# Patient Record
Sex: FEMALE | Race: WHITE
[De-identification: names, ages, dates, MRNs, and addresses within clinical notes are randomized per-mention and may not be internally consistent; named-entity substitution may affect disease eponyms.]

---

## 2018-07-25 ENCOUNTER — HOSPITAL ENCOUNTER (OUTPATIENT)
Dept: HOSPITAL 9 - MADLAB | Age: 78
Discharge: HOME | End: 2018-07-25
Payer: MEDICARE

## 2018-07-25 DIAGNOSIS — M47.817: Primary | ICD-10-CM

## 2018-07-25 DIAGNOSIS — M46.1: ICD-10-CM

## 2018-07-25 LAB
BASOPHILS # BLD AUTO: 0.1 THOU/UL (ref 0–0.2)
BASOPHILS NFR BLD AUTO: 1 % (ref 0–1)
EOSINOPHIL # BLD AUTO: 0.2 THOU/UL (ref 0–0.7)
EOSINOPHIL NFR BLD AUTO: 2.2 % (ref 0–10)
HGB BLD-MCNC: 15 G/DL (ref 12–16)
LYMPHOCYTES # BLD AUTO: 2.5 THOU/UL (ref 1.2–3.4)
LYMPHOCYTES NFR BLD AUTO: 27.7 % (ref 21–51)
MCH RBC QN AUTO: 29.9 PG (ref 27–31)
MCV RBC AUTO: 92.9 FL (ref 78–98)
MONOCYTES # BLD AUTO: 0.6 THOU/UL (ref 0.11–0.59)
MONOCYTES NFR BLD AUTO: 6.6 % (ref 0–10)
NEUTROPHILS # BLD AUTO: 5.7 THOU/UL (ref 1.4–6.5)
NEUTROPHILS NFR BLD AUTO: 62.4 % (ref 42–75)
PLATELET # BLD AUTO: 216 THOU/UL (ref 130–400)
RBC # BLD AUTO: 5 MILL/UL (ref 4.2–5.4)
WBC # BLD AUTO: 9.1 THOU/UL (ref 4.8–10.8)

## 2018-07-25 PROCEDURE — 85025 COMPLETE CBC W/AUTO DIFF WBC: CPT

## 2018-07-25 PROCEDURE — 36415 COLL VENOUS BLD VENIPUNCTURE: CPT

## 2018-09-11 ENCOUNTER — HOSPITAL ENCOUNTER (OUTPATIENT)
Dept: HOSPITAL 9 - MADLABBHPM | Age: 78
Discharge: HOME | End: 2018-09-11
Attending: FAMILY MEDICINE
Payer: MEDICARE

## 2018-09-11 DIAGNOSIS — N30.00: Primary | ICD-10-CM

## 2018-09-11 PROCEDURE — 87077 CULTURE AEROBIC IDENTIFY: CPT

## 2018-09-11 PROCEDURE — 84443 ASSAY THYROID STIM HORMONE: CPT

## 2018-09-11 PROCEDURE — 87086 URINE CULTURE/COLONY COUNT: CPT

## 2018-09-11 PROCEDURE — 36415 COLL VENOUS BLD VENIPUNCTURE: CPT

## 2018-09-25 ENCOUNTER — HOSPITAL ENCOUNTER (OUTPATIENT)
Dept: HOSPITAL 9 - MADLABBHPM | Age: 78
Discharge: HOME | End: 2018-09-25
Attending: FAMILY MEDICINE
Payer: MEDICARE

## 2018-09-25 DIAGNOSIS — N30.00: ICD-10-CM

## 2018-09-25 DIAGNOSIS — Z01.818: Primary | ICD-10-CM

## 2018-09-25 LAB
ALBUMIN SERPL BCG-MCNC: 3.9 G/DL (ref 3.4–4.8)
ALP SERPL-CCNC: 95 U/L (ref 40–150)
ALT SERPL W P-5'-P-CCNC: 11 U/L (ref 8–55)
ANION GAP SERPL CALC-SCNC: 11 MMOL/L (ref 10–20)
AST SERPL-CCNC: 18 U/L (ref 5–34)
BACTERIA UR QL AUTO: (no result) HPF
BASOPHILS # BLD AUTO: 0.1 THOU/UL (ref 0–0.2)
BASOPHILS NFR BLD AUTO: 0.6 % (ref 0–1)
BILIRUB SERPL-MCNC: 0.7 MG/DL (ref 0.2–1.2)
BUN SERPL-MCNC: 10 MG/DL (ref 9.8–20.1)
CALCIUM SERPL-MCNC: 9.8 MG/DL (ref 7.8–10.44)
CHLORIDE SERPL-SCNC: 99 MMOL/L (ref 98–107)
CO2 SERPL-SCNC: 33 MMOL/L (ref 23–31)
CREAT CL PREDICTED SERPL C-G-VRATE: 0 ML/MIN (ref 70–130)
EOSINOPHIL # BLD AUTO: 0.2 THOU/UL (ref 0–0.7)
EOSINOPHIL NFR BLD AUTO: 1.6 % (ref 0–10)
GLOBULIN SER CALC-MCNC: 3.3 G/DL (ref 2.4–3.5)
GLUCOSE SERPL-MCNC: 112 MG/DL (ref 83–110)
HGB BLD-MCNC: 15.5 G/DL (ref 12–16)
LYMPHOCYTES # BLD AUTO: 2.4 THOU/UL (ref 1.2–3.4)
LYMPHOCYTES NFR BLD AUTO: 23.8 % (ref 21–51)
MCH RBC QN AUTO: 30.2 PG (ref 27–31)
MCV RBC AUTO: 93.3 FL (ref 78–98)
MONOCYTES # BLD AUTO: 0.6 THOU/UL (ref 0.11–0.59)
MONOCYTES NFR BLD AUTO: 6.2 % (ref 0–10)
NEUTROPHILS # BLD AUTO: 6.9 THOU/UL (ref 1.4–6.5)
NEUTROPHILS NFR BLD AUTO: 67.8 % (ref 42–75)
PLATELET # BLD AUTO: 280 THOU/UL (ref 130–400)
POTASSIUM SERPL-SCNC: 3.6 MMOL/L (ref 3.5–5.1)
RBC # BLD AUTO: 5.13 MILL/UL (ref 4.2–5.4)
RBC UR QL AUTO: (no result) HPF (ref 0–3)
SODIUM SERPL-SCNC: 139 MMOL/L (ref 136–145)
SP GR UR STRIP: 1.01 (ref 1–1.03)
WBC # BLD AUTO: 10.1 THOU/UL (ref 4.8–10.8)
WBC UR QL AUTO: (no result) HPF (ref 0–3)

## 2018-09-25 PROCEDURE — 81001 URINALYSIS AUTO W/SCOPE: CPT

## 2018-09-25 PROCEDURE — 80053 COMPREHEN METABOLIC PANEL: CPT

## 2018-09-25 PROCEDURE — 85025 COMPLETE CBC W/AUTO DIFF WBC: CPT

## 2018-09-25 PROCEDURE — 87086 URINE CULTURE/COLONY COUNT: CPT

## 2018-09-25 PROCEDURE — 36415 COLL VENOUS BLD VENIPUNCTURE: CPT

## 2018-10-01 ENCOUNTER — HOSPITAL ENCOUNTER (EMERGENCY)
Dept: HOSPITAL 9 - MADERS | Age: 78
Discharge: HOME | End: 2018-10-01
Payer: MEDICARE

## 2018-10-01 DIAGNOSIS — Z47.89: Primary | ICD-10-CM

## 2018-10-01 PROCEDURE — 99282 EMERGENCY DEPT VISIT SF MDM: CPT

## 2018-10-15 ENCOUNTER — HOSPITAL ENCOUNTER (EMERGENCY)
Dept: HOSPITAL 9 - MADERS | Age: 78
Discharge: HOME | End: 2018-10-15
Payer: MEDICARE

## 2018-10-15 DIAGNOSIS — Z79.899: ICD-10-CM

## 2018-10-15 DIAGNOSIS — R03.0: Primary | ICD-10-CM

## 2018-10-15 PROCEDURE — 99283 EMERGENCY DEPT VISIT LOW MDM: CPT

## 2018-10-28 ENCOUNTER — HOSPITAL ENCOUNTER (EMERGENCY)
Dept: HOSPITAL 9 - MADERS | Age: 78
Discharge: ANOTHER HOSPITAL | End: 2018-10-28
Payer: MEDICARE

## 2018-10-28 ENCOUNTER — HOSPITAL ENCOUNTER (INPATIENT)
Dept: HOSPITAL 92 - ERS | Age: 78
LOS: 9 days | Discharge: NURSING, REHAB OR LONG-TERM CARE | DRG: 189 | End: 2018-11-06
Attending: HOSPITALIST | Admitting: HOSPITALIST
Payer: MEDICARE

## 2018-10-28 VITALS — BODY MASS INDEX: 25.8 KG/M2

## 2018-10-28 DIAGNOSIS — G93.41: ICD-10-CM

## 2018-10-28 DIAGNOSIS — B96.20: ICD-10-CM

## 2018-10-28 DIAGNOSIS — Z85.118: ICD-10-CM

## 2018-10-28 DIAGNOSIS — J44.9: ICD-10-CM

## 2018-10-28 DIAGNOSIS — I48.0: ICD-10-CM

## 2018-10-28 DIAGNOSIS — E86.9: ICD-10-CM

## 2018-10-28 DIAGNOSIS — Z96.642: ICD-10-CM

## 2018-10-28 DIAGNOSIS — D64.9: ICD-10-CM

## 2018-10-28 DIAGNOSIS — I25.10: ICD-10-CM

## 2018-10-28 DIAGNOSIS — E87.2: ICD-10-CM

## 2018-10-28 DIAGNOSIS — Z79.899: ICD-10-CM

## 2018-10-28 DIAGNOSIS — F11.90: ICD-10-CM

## 2018-10-28 DIAGNOSIS — D72.829: Primary | ICD-10-CM

## 2018-10-28 DIAGNOSIS — E78.5: ICD-10-CM

## 2018-10-28 DIAGNOSIS — R79.89: ICD-10-CM

## 2018-10-28 DIAGNOSIS — E03.9: ICD-10-CM

## 2018-10-28 DIAGNOSIS — F17.210: ICD-10-CM

## 2018-10-28 DIAGNOSIS — I48.92: ICD-10-CM

## 2018-10-28 DIAGNOSIS — N17.9: ICD-10-CM

## 2018-10-28 DIAGNOSIS — I10: ICD-10-CM

## 2018-10-28 DIAGNOSIS — N30.90: ICD-10-CM

## 2018-10-28 DIAGNOSIS — Z90.49: ICD-10-CM

## 2018-10-28 DIAGNOSIS — I24.8: ICD-10-CM

## 2018-10-28 DIAGNOSIS — M81.0: ICD-10-CM

## 2018-10-28 DIAGNOSIS — I08.1: ICD-10-CM

## 2018-10-28 DIAGNOSIS — G89.29: ICD-10-CM

## 2018-10-28 DIAGNOSIS — J96.01: Primary | ICD-10-CM

## 2018-10-28 DIAGNOSIS — N39.0: ICD-10-CM

## 2018-10-28 LAB
ALBUMIN SERPL BCG-MCNC: 3.7 G/DL (ref 3.4–4.8)
ALP SERPL-CCNC: 95 U/L (ref 40–150)
ALT SERPL W P-5'-P-CCNC: 34 U/L (ref 8–55)
ANALYZER IN CARDIO: (no result)
ANION GAP SERPL CALC-SCNC: 19 MMOL/L (ref 10–20)
APTT PPP: 138.2 SEC (ref 22.9–36.1)
APTT PPP: 28.2 SEC (ref 22.9–36.1)
APTT PPP: 31.7 SEC (ref 22.9–36.1)
AST SERPL-CCNC: 59 U/L (ref 5–34)
BACTERIA UR QL AUTO: (no result) HPF
BASE EXCESS STD BLDA CALC-SCNC: -9.4 MEQ/L
BASOPHILS # BLD AUTO: 0.1 THOU/UL (ref 0–0.2)
BASOPHILS NFR BLD AUTO: 0.4 % (ref 0–1)
BILIRUB SERPL-MCNC: 0.4 MG/DL (ref 0.2–1.2)
BUN SERPL-MCNC: 29 MG/DL (ref 9.8–20.1)
CA-I BLDA-SCNC: 0.86 MMOL/L (ref 1.12–1.3)
CALCIUM SERPL-MCNC: 9.2 MG/DL (ref 7.8–10.44)
CHD RISK SERPL-RTO: 2 (ref ?–4.5)
CHLORIDE SERPL-SCNC: 92 MMOL/L (ref 98–107)
CHOLEST SERPL-MCNC: 72 MG/DL
CK MB SERPL-MCNC: 13.8 NG/ML (ref 0–6.6)
CO2 SERPL-SCNC: 28 MMOL/L (ref 23–31)
COMM CRITICAL RESULTS DOC: (no result)
CREAT CL PREDICTED SERPL C-G-VRATE: 0 ML/MIN (ref 70–130)
EOSINOPHIL # BLD AUTO: 0 THOU/UL (ref 0–0.7)
EOSINOPHIL NFR BLD AUTO: 0.1 % (ref 0–10)
GLOBULIN SER CALC-MCNC: 2.8 G/DL (ref 2.4–3.5)
GLUCOSE SERPL-MCNC: 184 MG/DL (ref 83–110)
HCO3 BLDA-SCNC: 17.7 MEQ/L (ref 22–28)
HCT VFR BLDA CALC: 27 % (ref 36–47)
HDLC SERPL-MCNC: 36 MG/DL
HGB BLD-MCNC: 13.2 G/DL (ref 12–16)
HGB BLD-MCNC: 13.3 G/DL (ref 12–16)
HGB BLD-MCNC: 14.7 G/DL (ref 12–16)
HGB BLDA-MCNC: 9.3 G/DL (ref 12–16)
INR PPP: 1
INR PPP: 1.1
LDLC SERPL CALC-MCNC: 28 MG/DL
LYMPHOCYTES # BLD: 1.7 THOU/UL (ref 1.2–3.4)
LYMPHOCYTES NFR BLD AUTO: 9.9 % (ref 21–51)
MCH RBC QN AUTO: 30.6 PG (ref 27–31)
MCH RBC QN AUTO: 30.9 PG (ref 27–31)
MCV RBC AUTO: 94.6 FL (ref 78–98)
MCV RBC AUTO: 95.9 FL (ref 78–98)
MDIFF COMPLETE?: YES
MONOCYTES # BLD AUTO: 1.5 THOU/UL (ref 0.11–0.59)
MONOCYTES NFR BLD AUTO: 8.9 % (ref 0–10)
NEUTROPHILS # BLD AUTO: 13.6 THOU/UL (ref 1.4–6.5)
NEUTROPHILS NFR BLD AUTO: 80.7 % (ref 42–75)
PCO2 BLDA: 43.6 MMHG (ref 35–45)
PH BLDA: 7.23 [PH] (ref 7.35–7.45)
PLATELET # BLD AUTO: 165 THOU/UL (ref 130–400)
PLATELET # BLD AUTO: 171 THOU/UL (ref 130–400)
PLATELET # BLD AUTO: 197 THOU/UL (ref 130–400)
PLATELET BLD QL SMEAR: (no result)
PO2 BLDA: 40 MMHG (ref 70–?)
POTASSIUM BLD-SCNC: 2.16 MMOL/L (ref 3.7–5.3)
POTASSIUM SERPL-SCNC: 4.2 MMOL/L (ref 3.5–5.1)
PROT UR STRIP.AUTO-MCNC: 30 MG/DL
PROTHROMBIN TIME: 13.1 SEC (ref 12–14.7)
PROTHROMBIN TIME: 13.9 SEC (ref 12–14.7)
RBC # BLD AUTO: 4.31 MILL/UL (ref 4.2–5.4)
RBC # BLD AUTO: 4.79 MILL/UL (ref 4.2–5.4)
RBC UR QL AUTO: (no result) HPF (ref 0–3)
SODIUM SERPL-SCNC: 135 MMOL/L (ref 136–145)
SP GR UR STRIP: 1.02 (ref 1–1.03)
SPECIMEN DRAWN FROM PATIENT: (no result)
TRIGL SERPL-MCNC: 39 MG/DL (ref ?–150)
TROPONIN I SERPL DL<=0.01 NG/ML-MCNC: 1.76 NG/ML (ref ?–0.03)
TROPONIN I SERPL DL<=0.01 NG/ML-MCNC: 2.06 NG/ML (ref ?–0.03)
TROPONIN I SERPL DL<=0.01 NG/ML-MCNC: 2.08 NG/ML (ref ?–0.03)
UNIDENT CRYS #/AREA URNS HPF: (no result) HPF
WBC # BLD AUTO: 16.8 THOU/UL (ref 4.8–10.8)
WBC # BLD AUTO: 22.7 THOU/UL (ref 4.8–10.8)
WBC UR QL AUTO: (no result) HPF (ref 0–3)

## 2018-10-28 PROCEDURE — 71045 X-RAY EXAM CHEST 1 VIEW: CPT

## 2018-10-28 PROCEDURE — 93005 ELECTROCARDIOGRAM TRACING: CPT

## 2018-10-28 PROCEDURE — 93798 PHYS/QHP OP CAR RHAB W/ECG: CPT

## 2018-10-28 PROCEDURE — 85027 COMPLETE CBC AUTOMATED: CPT

## 2018-10-28 PROCEDURE — 80048 BASIC METABOLIC PNL TOTAL CA: CPT

## 2018-10-28 PROCEDURE — 36415 COLL VENOUS BLD VENIPUNCTURE: CPT

## 2018-10-28 PROCEDURE — 93306 TTE W/DOPPLER COMPLETE: CPT

## 2018-10-28 PROCEDURE — 93010 ELECTROCARDIOGRAM REPORT: CPT

## 2018-10-28 PROCEDURE — 99152 MOD SED SAME PHYS/QHP 5/>YRS: CPT

## 2018-10-28 PROCEDURE — 96376 TX/PRO/DX INJ SAME DRUG ADON: CPT

## 2018-10-28 PROCEDURE — 81003 URINALYSIS AUTO W/O SCOPE: CPT

## 2018-10-28 PROCEDURE — 85049 AUTOMATED PLATELET COUNT: CPT

## 2018-10-28 PROCEDURE — 84443 ASSAY THYROID STIM HORMONE: CPT

## 2018-10-28 PROCEDURE — 83605 ASSAY OF LACTIC ACID: CPT

## 2018-10-28 PROCEDURE — 83880 ASSAY OF NATRIURETIC PEPTIDE: CPT

## 2018-10-28 PROCEDURE — 82805 BLOOD GASES W/O2 SATURATION: CPT

## 2018-10-28 PROCEDURE — C1769 GUIDE WIRE: HCPCS

## 2018-10-28 PROCEDURE — 94760 N-INVAS EAR/PLS OXIMETRY 1: CPT

## 2018-10-28 PROCEDURE — 96360 HYDRATION IV INFUSION INIT: CPT

## 2018-10-28 PROCEDURE — 80061 LIPID PANEL: CPT

## 2018-10-28 PROCEDURE — 87086 URINE CULTURE/COLONY COUNT: CPT

## 2018-10-28 PROCEDURE — B2111ZZ FLUOROSCOPY OF MULTIPLE CORONARY ARTERIES USING LOW OSMOLAR CONTRAST: ICD-10-PCS | Performed by: INTERNAL MEDICINE

## 2018-10-28 PROCEDURE — B2151ZZ FLUOROSCOPY OF LEFT HEART USING LOW OSMOLAR CONTRAST: ICD-10-PCS | Performed by: INTERNAL MEDICINE

## 2018-10-28 PROCEDURE — 82553 CREATINE MB FRACTION: CPT

## 2018-10-28 PROCEDURE — 96365 THER/PROPH/DIAG IV INF INIT: CPT

## 2018-10-28 PROCEDURE — 36680 INSERT NEEDLE BONE CAVITY: CPT

## 2018-10-28 PROCEDURE — 96368 THER/DIAG CONCURRENT INF: CPT

## 2018-10-28 PROCEDURE — A4353 INTERMITTENT URINARY CATH: HCPCS

## 2018-10-28 PROCEDURE — 93458 L HRT ARTERY/VENTRICLE ANGIO: CPT

## 2018-10-28 PROCEDURE — 80202 ASSAY OF VANCOMYCIN: CPT

## 2018-10-28 PROCEDURE — 80053 COMPREHEN METABOLIC PANEL: CPT

## 2018-10-28 PROCEDURE — 85730 THROMBOPLASTIN TIME PARTIAL: CPT

## 2018-10-28 PROCEDURE — 96361 HYDRATE IV INFUSION ADD-ON: CPT

## 2018-10-28 PROCEDURE — 87186 SC STD MICRODIL/AGAR DIL: CPT

## 2018-10-28 PROCEDURE — 85610 PROTHROMBIN TIME: CPT

## 2018-10-28 PROCEDURE — 81015 MICROSCOPIC EXAM OF URINE: CPT

## 2018-10-28 PROCEDURE — 85025 COMPLETE CBC W/AUTO DIFF WBC: CPT

## 2018-10-28 PROCEDURE — 87804 INFLUENZA ASSAY W/OPTIC: CPT

## 2018-10-28 PROCEDURE — 94660 CPAP INITIATION&MGMT: CPT

## 2018-10-28 PROCEDURE — 76882 US LMTD JT/FCL EVL NVASC XTR: CPT

## 2018-10-28 PROCEDURE — 85014 HEMATOCRIT: CPT

## 2018-10-28 PROCEDURE — 84484 ASSAY OF TROPONIN QUANT: CPT

## 2018-10-28 PROCEDURE — 94640 AIRWAY INHALATION TREATMENT: CPT

## 2018-10-28 PROCEDURE — 87077 CULTURE AEROBIC IDENTIFY: CPT

## 2018-10-28 PROCEDURE — 85007 BL SMEAR W/DIFF WBC COUNT: CPT

## 2018-10-28 PROCEDURE — 85018 HEMOGLOBIN: CPT

## 2018-10-28 PROCEDURE — 4A023N7 MEASUREMENT OF CARDIAC SAMPLING AND PRESSURE, LEFT HEART, PERCUTANEOUS APPROACH: ICD-10-PCS | Performed by: INTERNAL MEDICINE

## 2018-10-28 PROCEDURE — S0028 INJECTION, FAMOTIDINE, 20 MG: HCPCS

## 2018-10-28 PROCEDURE — 87040 BLOOD CULTURE FOR BACTERIA: CPT

## 2018-10-28 RX ADMIN — FAMOTIDINE SCH MG: 10 INJECTION, SOLUTION INTRAVENOUS at 21:53

## 2018-10-28 NOTE — RAD
PORTABLE CHEST:

 

Date:  10/28/18 

 

HISTORY:  

Chest pain. 

 

COMPARISON:  

2007 film. 

 

FINDINGS:

Mild cardiomegaly. There is evidence of mild vascular congestion. Interstitial prominence suggests mi
ld interstitial congestion and edema. No confluent alveolar process. No significant effusion. 

 

IMPRESSION: 

Mild congestive change. 

 

 

POS: [facility]

## 2018-10-28 NOTE — RAD
CHEST ONE VIEW:

 

History: Chest pain. 

 

Comparison: Earlier exam on same date. 

 

FINDINGS: 

Cardiac silhouette is magnified by projection. Pulmonary vasculature unremarkable. Patient is rotated
 rightward. Calcification in the arterial structures. No lobar consolidation or evidence of pneumotho
rax. 

 

IMPRESSION: 

1. Atherosclerosis. 

2. No active cardiopulmonary abnormalities are demonstrated.

 

POS: [facility]

## 2018-10-29 LAB
ANION GAP SERPL CALC-SCNC: 13 MMOL/L (ref 10–20)
BASOPHILS # BLD AUTO: 0 THOU/UL (ref 0–0.2)
BASOPHILS NFR BLD AUTO: 0.2 % (ref 0–1)
BUN SERPL-MCNC: 30 MG/DL (ref 9.8–20.1)
CALCIUM SERPL-MCNC: 8.1 MG/DL (ref 7.8–10.44)
CHLORIDE SERPL-SCNC: 101 MMOL/L (ref 98–107)
CO2 SERPL-SCNC: 23 MMOL/L (ref 23–31)
CREAT CL PREDICTED SERPL C-G-VRATE: 39 ML/MIN (ref 70–130)
EOSINOPHIL # BLD AUTO: 0 THOU/UL (ref 0–0.7)
EOSINOPHIL NFR BLD AUTO: 0.1 % (ref 0–10)
GLUCOSE SERPL-MCNC: 106 MG/DL (ref 83–110)
HGB BLD-MCNC: 12.7 G/DL (ref 12–16)
LYMPHOCYTES # BLD: 2.1 THOU/UL (ref 1.2–3.4)
LYMPHOCYTES NFR BLD AUTO: 15.4 % (ref 21–51)
MCH RBC QN AUTO: 31.3 PG (ref 27–31)
MCV RBC AUTO: 95.2 FL (ref 78–98)
MONOCYTES # BLD AUTO: 1.1 THOU/UL (ref 0.11–0.59)
MONOCYTES NFR BLD AUTO: 8 % (ref 0–10)
NEUTROPHILS # BLD AUTO: 10.6 THOU/UL (ref 1.4–6.5)
NEUTROPHILS NFR BLD AUTO: 76.4 % (ref 42–75)
PLATELET # BLD AUTO: 149 THOU/UL (ref 130–400)
POTASSIUM SERPL-SCNC: 3.6 MMOL/L (ref 3.5–5.1)
RBC # BLD AUTO: 4.05 MILL/UL (ref 4.2–5.4)
SODIUM SERPL-SCNC: 133 MMOL/L (ref 136–145)
TROPONIN I SERPL DL<=0.01 NG/ML-MCNC: 1.57 NG/ML (ref ?–0.03)
WBC # BLD AUTO: 13.9 THOU/UL (ref 4.8–10.8)

## 2018-10-29 RX ADMIN — FAMOTIDINE SCH MG: 10 INJECTION, SOLUTION INTRAVENOUS at 08:55

## 2018-10-29 RX ADMIN — HYDROCODONE BITARTRATE AND ACETAMINOPHEN PRN TAB: 10; 325 TABLET ORAL at 15:00

## 2018-10-29 RX ADMIN — FAMOTIDINE SCH MG: 10 INJECTION, SOLUTION INTRAVENOUS at 20:24

## 2018-10-29 RX ADMIN — MOMETASONE FUROATE AND FORMOTEROL FUMARATE DIHYDRATE SCH PUFF: 200; 5 AEROSOL RESPIRATORY (INHALATION) at 18:38

## 2018-10-29 RX ADMIN — Medication SCH ML: at 20:24

## 2018-10-29 RX ADMIN — VANCOMYCIN HYDROCHLORIDE SCH MLS: 1 INJECTION, SOLUTION INTRAVENOUS at 20:23

## 2018-10-29 RX ADMIN — ASPIRIN SCH MG: 81 TABLET ORAL at 11:06

## 2018-10-29 NOTE — EKG
Test Reason : A-FIB

Blood Pressure : ***/*** mmHG

Vent. Rate : 118 BPM     Atrial Rate : 078 BPM

   P-R Int : 000 ms          QRS Dur : 096 ms

    QT Int : 332 ms       P-R-T Axes : 000 027 022 degrees

   QTc Int : 465 ms

 

Atrial fibrillation with rapid ventricular response

ST elevation, consider early repolarization, pericarditis, or injury

Abnormal ECG

Confirmed by NEYMAR ARMENTA ([id number]) on 10/29/2018 2:52:24 PM

 

Referred By:  AUGUSTO           Confirmed By:NEYMAR ARMENTA

## 2018-10-29 NOTE — CON
DATE OF CONSULTATION:  10/29/2018

 

PAST HISTORY:  Ms. Eid is a 78-year-old female who has issues with 
chronic back pain.  She presented with hypotension and metabolic acidosis as 
well as tachypnea.

 

She was placed on BiPAP.

 

She has had severe diarrhea for 2 days leading up to this, now is complaining 
of abdominal cramping, but has not had morphine since admission.

 

She says her diarrhea has resolved.

 

PAST MEDICAL HISTORY:  Remarkable for hypertension, hypothyroidism, osteoporosis
, history of lung cancer, history of lumbar spine surgery.

 

PAST SURGICAL HISTORY:

1.  History of lung cancer resected.

2.  Status post cholecystectomy.

3.  History of herniorrhaphy.

4.  History of hip replacement.

 

FAMILY HISTORY:  Negative for lung disease in early age.



SOCIAL HISTORY: Not obtained.

 

ALLERGIES:  She reports an allergy to HYDRALAZINE.  She does not use drugs.

 

MEDICATIONS:  Medications have been reviewed.

 

REVIEW OF SYSTEMS:  10 point review of systems completed, otherwise remarkable 
only for pain all over.

 

PHYSICAL EXAM



GENERAL: Ms. Eid is a 78-year-old female who has issues with chronic back 
pain.

HEENT: pupils react

EYES:Sclera anicteric 

NECK:no nodes

VITALS: She has had atrial fibrillation since she has been admitted.

LUNGS:faint wheezes

HEART:RRR NO GALLOP.  2/6 SYSTOLIC MURMUR 

ABDOMEN: soft   

EXTREMITIES: without clubbing.



LABORATORY DATA:  Urinalysis was unremarkable.  When she was admitted, her 
blood gas was done showed a pH 7.23, CO2 is 43, pO2 of 40 on BiPAP.  White 
count was 16.8, hemoglobin 13.3, platelets 171.  Today, sodium 135, potassium 
4.2, chloride 92, BUN 29, creatinine 1.87.  Followup Chem-7 shows creatinine 
down to 1.12.

 

IMPRESSION AND PLAN:  Metabolic acidosis secondary to severe diarrhea with 
secondary bicarbonate losses, this is improved.

 

There is no output recorded for 24 hours, which would be helpful, so I do not 
know where she is from a fluid standpoint, but it sounds like she was dry.

 

Her current complaints are more likely related to the beginning of acute 
morphine withdrawal, so she will need to be put back on her slow-release 
morphine.  She was given one dose of IV morphine this morning.

 

She will stay in the Intermediate Care Unit for now.

 

This is a 50-minute consult, with greater than 50% of the time spent in the 
unit coordinating care.

 

MAKAYLA

## 2018-10-29 NOTE — PDOC.PN
- Subjective


Encounter Start Date: 10/29/18


Encounter Start Time: 13:50


Subjective: f/u for AMS, NSTEMI, A-fib RVR. Nsg reports pt c/o of increasing 

back pain


-: noted on chronic narcotics at home. 





- Objective


Resuscitation Status: 


 











Resuscitation Status           FULL:Full Resuscitation














MAR Reviewed: Yes


Vital Signs & Weight: 


 Vital Signs (12 hours)











  Temp Pulse Resp BP Pulse Ox


 


 10/29/18 10:55   90  20  


 


 10/29/18 08:00      95


 


 10/29/18 07:32   93  18   92 L


 


 10/29/18 04:00  98.4 F  91  17  126/73  96


 


 10/29/18 02:30    13  


 


 10/29/18 02:29   96  13   96








 Weight











Weight                         127 lb 3.2 oz














I&O: 


 











 10/28/18 10/29/18 10/30/18





 06:59 06:59 06:59


 


Intake Total  800 


 


Balance  800 











Result Diagrams: 


 10/29/18 01:59





 10/29/18 01:59


Additional Labs: 





Microbiology





10/28/18 19:50   Nasal swab   Influenza Types A,B Direct EIA - Final





 Laboratory Tests











  10/28/18 10/28/18 10/28/18





  13:18 13:18 17:56


 


WBC   


 


Neutrophils %   


 


Sodium   135 L 


 


Creatinine   1.87 H 


 


Lactic Acid   


 


Troponin I  2.082 H*   2.059 H*


 


TSH 3rd Generation   














  10/28/18 10/28/18 10/28/18





  17:56 17:57 20:20


 


WBC   16.8 H 


 


Neutrophils %   80.7 H 


 


Sodium   


 


Creatinine   


 


Lactic Acid  1.4  


 


Troponin I   


 


TSH 3rd Generation    0.4216














  10/28/18 10/29/18 10/29/18





  23:00 01:59 01:59


 


WBC   


 


Neutrophils %   76.4 H 


 


Sodium   


 


Creatinine   


 


Lactic Acid   


 


Troponin I  1.760 H*   1.571 H*


 


TSH 3rd Generation   











EKG Reviewed by me: Yes (Tele - A-fib in 80's)





Phys Exam





- Physical Examination


Constitutional: NAD


alert, responsive


HEENT: PERRLA, sclera anicteric, oral pharynx no lesions


Neck: no nodes, no JVD, supple, full ROM


Respiratory: no wheezing, no rales, no rhonchi, clear to auscultation bilateral


S1, S2


Cardiovascular: no significant murmur, no rub, gallop, irregular


Gastrointestinal: soft, non-tender, no distention, positive bowel sounds


Musculoskeletal: no edema, pulses present


Neurological: normal sensation, moves all 4 limbs


Psychiatric: A&O x 3


Skin: no rash, normal turgor, cap refill <2 seconds





Dx/Plan


(1) NSTEMI (non-ST elevated myocardial infarction)


Code(s): I21.4 - NON-ST ELEVATION (NSTEMI) MYOCARDIAL INFARCTION   Status: 

Acute   Comment: Continue Lovenox, ASA, Lipitor, Cardiology consulted, Echo 

pending   





(2) Acute respiratory failure with hypoxia


Code(s): J96.01 - ACUTE RESPIRATORY FAILURE WITH HYPOXIA   Status: Acute   

Comment: Improved, off BiPAP NIMV, continue O2 via NC   





(3) Acute metabolic encephalopathy


Code(s): G93.41 - METABOLIC ENCEPHALOPATHY   Status: Acute   Comment: Likely 

multifactorial given chronic narcotic use, hypoxia and NSTEMI   





(4) ДМИТРИЙ (acute kidney injury)


Code(s): N17.9 - ACUTE KIDNEY FAILURE, UNSPECIFIED   Status: Acute   Comment: 

Improved, likely due to volume depletion, avoid nephrotoxic meds and limit 

contrast exposure   





(5) Atrial fibrillation with RVR


Code(s): I48.91 - UNSPECIFIED ATRIAL FIBRILLATION   Status: Acute   Comment: 

Continue Cardizem gtt, rate improved, 2D echo pending, anticoagulation with 

Lovenox and consider OAC options for d/c   





(6) Chronic pain syndrome


Code(s): G89.4 - CHRONIC PAIN SYNDROME   Status: Chronic   Comment: Resume home 

MS Contin, Gabapentin, Norco   





- Plan


plan discussed w/ family, PT/OT, , DVT proph w/SCDs


Stable currently


-: Continue Lovenox


-: Continue Cardizem gtt


-: 2D echo pending


-: AM lab: BMP, CBC





* .

## 2018-10-29 NOTE — CON
DATE OF CONSULTATION:  10/29/2018

 

HISTORY OF PRESENT:  This patient is a 78-year-old woman with no previous 
cardiac history who presented with fevers, chills, diarrhea, and was noted to 
have an elevated troponin level.  The patient was in her usual state of health 
until recently when she developed acute onset of diarrhea.  She noted to be 
having fevers and chills.  The patient denied having any chest discomfort.  The 
patient presented to the local Emergency Room, She was noted to have an 
elevated troponin level.

 

PAST MEDICAL HISTORY:  

1.  History of carcinoid tumor.

2.  Chronic back pain.

3.  Hypertension.

4.  Osteoporosis.

5.  Dyslipidemia.

 

PAST SURGICAL HISTORY:  She has had removal of the carcinoid from her right 
medial lung resection,  cholecystectomy, hernia repair,and  hip replacement 
surgery.

 

MEDICATIONS ON ADMISSION:  See nursing list.

 

FAMILY HISTORY:  There is no strong family history of coronary artery disease.

 

SOCIAL HISTORY:  The patient has a long history of tobacco abuse and smokes 1 
pack per day.

 

FAMILY HISTORY:  No strong family history of heart disease.

 

ALLERGIES:  No known drug allergies.

 

REVIEW OF SYSTEMS:  The patient reports having severe back discomfort.

 

PHYSICAL EXAMINATION:

GENERAL:  This is a thin woman in marked distress.

VITAL SIGNS:  Blood pressure of 126/73.

NECK:  Showed no jugular vein distention.

LUNGS:  Lungs have diminished breath sounds bilateral.

HEART:  Irregular rate and rhythm, normal S1, S2, 1/6 systolic murmur.

ABDOMEN:  Nondistended.

EXTREMITIES:  Showed trace edema.

SKIN:  Warm and dry. 

VASCULAR:  Radial pulses 2+.

 

LABORATORIES:  White blood cell count was 13.9, hemoglobin 12.7, hematocrit 
38.5 and her platelets 149.  Her sodium was 133, potassium 3.6, chloride 101, 
bicarbonate 23, BUN 30, creatinine is 1.12.  Troponin was 1.7.  

 

Her EKG revealed her to have atrial fibrillation with nonspecific ST 
abnormality.

 

IMPRESSION:

1.  Sepsis.

2.  Non-Q-wave myocardial infarction.

3.  Atrial fibrillation with a rapid ventricular response.

4.  Chronic back pain.

5.  History of carcinoid of a tumor.

6.  Tobacco abuse.

 

This patient presented with diarrhea, fever, and chills and a markedly elevated 
white blood cell count.  She has been treated with IV antibiotics.  The patient'
s troponin level is elevated. This may be secondary to demand ischemia.  The 
patient has multiple risk factors.  Would continue to treat the patient for 
sepsis.  We will check the patient's echocardiogram and follow this patient 
with you through her hospitalization.

 

MAKAYLA

## 2018-10-30 LAB
ANION GAP SERPL CALC-SCNC: 11 MMOL/L (ref 10–20)
BUN SERPL-MCNC: 21 MG/DL (ref 9.8–20.1)
CALCIUM SERPL-MCNC: 8.4 MG/DL (ref 7.8–10.44)
CHLORIDE SERPL-SCNC: 101 MMOL/L (ref 98–107)
CO2 SERPL-SCNC: 24 MMOL/L (ref 23–31)
CREAT CL PREDICTED SERPL C-G-VRATE: 55 ML/MIN (ref 70–130)
GLUCOSE SERPL-MCNC: 144 MG/DL (ref 83–110)
HGB BLD-MCNC: 12.6 G/DL (ref 12–16)
HGB BLD-MCNC: 12.8 G/DL (ref 12–16)
MCH RBC QN AUTO: 30.6 PG (ref 27–31)
MCV RBC AUTO: 94 FL (ref 78–98)
MDIFF COMPLETE?: YES
PLATELET # BLD AUTO: 169 THOU/UL (ref 130–400)
PLATELET # BLD AUTO: 188 THOU/UL (ref 130–400)
POTASSIUM SERPL-SCNC: 3.2 MMOL/L (ref 3.5–5.1)
RBC # BLD AUTO: 4.1 MILL/UL (ref 4.2–5.4)
SODIUM SERPL-SCNC: 133 MMOL/L (ref 136–145)
VANCOMYCIN TROUGH SERPL-MCNC: 8.6 UG/ML
WBC # BLD AUTO: 12.2 THOU/UL (ref 4.8–10.8)

## 2018-10-30 RX ADMIN — ASPIRIN SCH MG: 81 TABLET ORAL at 09:18

## 2018-10-30 RX ADMIN — MOMETASONE FUROATE AND FORMOTEROL FUMARATE DIHYDRATE SCH PUFF: 200; 5 AEROSOL RESPIRATORY (INHALATION) at 08:14

## 2018-10-30 RX ADMIN — FAMOTIDINE SCH MG: 10 INJECTION, SOLUTION INTRAVENOUS at 09:19

## 2018-10-30 RX ADMIN — Medication SCH ML: at 20:41

## 2018-10-30 RX ADMIN — VANCOMYCIN HYDROCHLORIDE SCH MLS: 1 INJECTION, SOLUTION INTRAVENOUS at 20:42

## 2018-10-30 RX ADMIN — FAMOTIDINE SCH MG: 10 INJECTION, SOLUTION INTRAVENOUS at 20:40

## 2018-10-30 RX ADMIN — Medication SCH: at 09:19

## 2018-10-30 RX ADMIN — MOMETASONE FUROATE AND FORMOTEROL FUMARATE DIHYDRATE SCH PUFF: 200; 5 AEROSOL RESPIRATORY (INHALATION) at 18:42

## 2018-10-30 NOTE — PRG
DATE OF SERVICE:  10/30/2018

 

SUBJECTIVE:  She says she feels much better today.  She denies shortness of breath.  She has had diar
danielle this morning.  She tells me she had C. difficile last year.

 

OBJECTIVE:

VITAL SIGNS:  She is afebrile, heart rate is 79, respiratory rate is 12, oximetry is 85-92 on 3-4 lit
ers nasal cannula, blood pressure 119/59.

LUNGS:  Clear.

HEART:  Regular rhythm.

ABDOMEN:  Soft.

 

LABORATORY DATA:  White count 12.2, hemoglobin 12.6, platelets 169.  Sodium 133, potassium 3.2, chlor
fabi 101, bicarbonate 24, BUN 21, creatinine 0.78.

 

IMPRESSION:

1.  Diarrhea.

2.  Metabolic acidosis secondary to prolonged diarrhea with intravascular volume contraction.

3.  Early opiate withdrawal yesterday withholding after her morphine was held.  That is dramatically 
improved.

 

PLAN:  Send stool for C. difficile.  Continue with oxygen therapy.  I do not see blood cultures that 
are pending.

 

Her antibiotics probably should be simplified with her history of C. difficile-induced diarrhea and n
o clear source of infection other than her gastrointestinal tract.

## 2018-10-30 NOTE — PQF
DATE:      10-30-18                                                       ATTN:
  DR. KATIE KIM



Please exercise your independent, professional judgment in responding to the 
clarification form. 

Clinical indicators are provided on the bottom of this form for your review



Please check appropriate box(s) to clarify if the following diagnosis has been 
ruled in or  ruled out:

                                     SEPSIS



[  ] Ruled in diagnosis

     [  ] Continue to treat        [  ] Resolved

[ x ] Ruled out diagnosis

[  ] Other diagnosis ___________

[  ] Unable to determine



In addition, please specify:

Present on Admission (POA):  [  ] Yes             [ x ] No             [  ] 
Unable to determine



For continuity of documentation, please document condition throughout progress 
notes and discharge summary.  Thank You.



CLINICAL INDICATORS - SIGNS / SYMPTOMS / LABS



ER DX:   SEPSIS,  HYPOXIC RESPIRATORY FAILURE,  LACTIC ACIDOSIS, NSTEMI



H&P:  

   ACUTE HYPOXIC RESP FAILURE, SEPSIS,  NON ST ELEVATION MI,  ELEVATED LACTIC 
ACIDOSIS IS MOST

   LIKELY 2/2 SEPSIS, WHICH IS IMPROVING, ACUTE METABOLIC ENCEPHALOPATHY



WBC: 

      10-28-18:   16.8

      10-29-18:   13.9

      10-30-18:   12.2



BP:  ER:   96/43,  88/60



PULSE:   ER:  103

              10-29-18:   127



RR:  ER:  34,  30, 24

        10-29-18:   22



RISK FACTORS: 

     H&P:   HX HTN, HYPOTHYROIDISM,  SMOKER,  HX LUNG CA, CAME IN WITH SOB, CP, 
AND GENERALIZED

     WEAKNESS,  DIARRHEA, HYPOTENSIVE



TREATMENTS:  

       ER:  VANCOMYCIN IV

       MAR:   MAXIPIME IV,   NS IVF



(This form is maintained as a part of the permanent medical record)

 2015 [employer].  All Rights Reserved

DANIELA [email or web address]    Office:  [phone]

                                                              

 

[facility]AY[facility]

## 2018-10-30 NOTE — PDOC.PN
- Subjective


Encounter Start Date: 10/30/18


Encounter Start Time: 14:40


Subjective: f/u for ? sepsis, NSTEMI, A-fib rvr converting to SR. Feels better 

overall.


-: Not sleeping very well. Appetite ok. Ucx showing GNR.





- Objective


Resuscitation Status: 


 











Resuscitation Status           FULL:Full Resuscitation














MAR Reviewed: Yes


Vital Signs & Weight: 


 Vital Signs (12 hours)











  Temp Pulse Resp BP BP Pulse Ox


 


 10/30/18 14:18    20   


 


 10/30/18 11:21  98.6 F  76  16   93/51 L  96


 


 10/30/18 11:20   77  16    95


 


 10/30/18 09:18   77   117/74  


 


 10/30/18 08:15       85 L


 


 10/30/18 08:13   77  16    85 L


 


 10/30/18 07:45       85 L


 


 10/30/18 07:20  98.5 F  79  12   119/59 L  85 L


 


 10/30/18 04:16  97.7 F  64  18   110/60  92 L








 Weight











Weight                         127 lb 3.2 oz














I&O: 


 











 10/29/18 10/30/18 10/31/18





 06:59 06:59 06:59


 


Intake Total 800 1410 


 


Balance 800 1410 











Result Diagrams: 


 10/30/18 03:41





 10/30/18 03:41


Additional Labs: 





Microbiology





10/28/18 19:50   Nasal swab   Influenza Types A,B Direct EIA - Final


10/28/18 15:15   Nasal swab   Influenza Types A,B Direct EIA - Final


10/28/18 14:13   Venous blood - Left Arm   Blood Culture - Preliminary


                              Specimen has been received and culture in 

progress.


                              No Growth to date.


10/28/18 14:13   Urine Straight Catheter   Urine Culture - Preliminary


                              Gram Negative Dread


10/28/18 14:10   Venous blood - Left Hand   Blood Culture - Preliminary


                              Specimen has been received and culture in 

progress.


                              No Growth to date.





 Laboratory Tests











  10/28/18 10/28/18 10/28/18





  13:18 13:18 17:56


 


WBC   


 


Neutrophils %   


 


Sodium   135 L 


 


Creatinine   1.87 H 


 


Lactic Acid   


 


Troponin I  2.082 H*   2.059 H*


 


TSH 3rd Generation   














  10/28/18 10/28/18 10/28/18





  17:56 17:57 20:20


 


WBC   16.8 H 


 


Neutrophils %   80.7 H 


 


Sodium   


 


Creatinine   


 


Lactic Acid  1.4  


 


Troponin I   


 


TSH 3rd Generation    0.4216














  10/28/18 10/29/18 10/29/18





  23:00 01:59 01:59


 


WBC   


 


Neutrophils %    76.4 H


 


Sodium   


 


Creatinine   1.12 H 


 


Lactic Acid   


 


Troponin I  1.760 H*  


 


TSH 3rd Generation   














  10/29/18





  01:59


 


WBC 


 


Neutrophils % 


 


Sodium 


 


Creatinine 


 


Lactic Acid 


 


Troponin I  1.571 H*


 


TSH 3rd Generation 











Radiology Reviewed by me: Yes (2D echo - EF 60-65%, mod MR, mod-severe TR)


EKG Reviewed by me: Yes (Tele - SR)





Phys Exam





- Physical Examination


Constitutional: NAD


HEENT: PERRLA, sclera anicteric, oral pharynx no lesions


Neck: no nodes, no JVD, supple, full ROM


bilat rhonchi


S1, S2


Cardiovascular: RRR, no significant murmur, no rub, gallop


Gastrointestinal: soft, non-tender, no distention, positive bowel sounds


Musculoskeletal: no edema, pulses present


Neurological: normal sensation, moves all 4 limbs


Psychiatric: A&O x 3


Skin: no rash, normal turgor, cap refill <2 seconds





Dx/Plan


(1) NSTEMI (non-ST elevated myocardial infarction)


Code(s): I21.4 - NON-ST ELEVATION (NSTEMI) MYOCARDIAL INFARCTION   Status: 

Acute   Comment: Continue Lovenox, ASA, Lipitor, Cardiology consulted, Echo 

with preserved EF 60-65%   





(2) Acute respiratory failure with hypoxia


Code(s): J96.01 - ACUTE RESPIRATORY FAILURE WITH HYPOXIA   Status: Acute   

Comment: Improved, off BiPAP NIMV, continue O2 via NC   





(3) Acute metabolic encephalopathy


Code(s): G93.41 - METABOLIC ENCEPHALOPATHY   Status: Acute   Comment: Likely 

multifactorial given chronic narcotic use, hypoxia and NSTEMI   





(4) ДМИТРИЙ (acute kidney injury)


Code(s): N17.9 - ACUTE KIDNEY FAILURE, UNSPECIFIED   Status: Acute   Comment: 

Improved, likely due to volume depletion, avoid nephrotoxic meds and limit 

contrast exposure   





(5) Atrial fibrillation with RVR


Code(s): I48.91 - UNSPECIFIED ATRIAL FIBRILLATION   Status: Acute   Comment: 

Converted to SR, continue Cardizem po, preserved EF, anticoagulation with 

Lovenox, pt high risk for OAC given fall risk   





(6) UTI (urinary tract infection)


Status: Acute   Comment: GNR initially, continue Cefepime pending final 

sensitivities   





(7) Chronic pain syndrome


Code(s): G89.4 - CHRONIC PAIN SYNDROME   Status: Chronic   Comment: Resume home 

MS Contin, Gabapentin, Norco   





(8) COPD (chronic obstructive pulmonary disease)


Status: Chronic   Comment: Continue Duonebs, Dulera, Proventil   





- Plan


plan discussed w/ family, continue antibiotics, PT/OT, , 

respiratory therapy, out of bed/ambulate, DVT proph w/SCDs


Stable overall


-: Saline lock IVF


-: Continue Cefepime and Vancomycin


-: Continue Duonebs, Dulera, O2 prn


-: Ensure High Protein TID





* AM lab: BMP, CBC

## 2018-10-31 LAB
ANION GAP SERPL CALC-SCNC: 10 MMOL/L (ref 10–20)
BUN SERPL-MCNC: 14 MG/DL (ref 9.8–20.1)
CALCIUM SERPL-MCNC: 8.5 MG/DL (ref 7.8–10.44)
CHLORIDE SERPL-SCNC: 101 MMOL/L (ref 98–107)
CO2 SERPL-SCNC: 26 MMOL/L (ref 23–31)
CREAT CL PREDICTED SERPL C-G-VRATE: 60 ML/MIN (ref 70–130)
GLUCOSE SERPL-MCNC: 114 MG/DL (ref 83–110)
HGB BLD-MCNC: 12.1 G/DL (ref 12–16)
MCH RBC QN AUTO: 30.7 PG (ref 27–31)
MCV RBC AUTO: 93.9 FL (ref 78–98)
MDIFF COMPLETE?: YES
PLATELET # BLD AUTO: 189 THOU/UL (ref 130–400)
PLATELET BLD QL SMEAR: (no result)
POTASSIUM SERPL-SCNC: 3 MMOL/L (ref 3.5–5.1)
RBC # BLD AUTO: 3.94 MILL/UL (ref 4.2–5.4)
SODIUM SERPL-SCNC: 134 MMOL/L (ref 136–145)
WBC # BLD AUTO: 11.4 THOU/UL (ref 4.8–10.8)

## 2018-10-31 RX ADMIN — Medication SCH ML: at 20:17

## 2018-10-31 RX ADMIN — FAMOTIDINE SCH MG: 10 INJECTION, SOLUTION INTRAVENOUS at 09:26

## 2018-10-31 RX ADMIN — ASPIRIN SCH MG: 81 TABLET ORAL at 09:26

## 2018-10-31 RX ADMIN — MOMETASONE FUROATE AND FORMOTEROL FUMARATE DIHYDRATE SCH PUFF: 200; 5 AEROSOL RESPIRATORY (INHALATION) at 06:49

## 2018-10-31 RX ADMIN — Medication SCH ML: at 09:28

## 2018-10-31 RX ADMIN — FAMOTIDINE SCH MG: 10 INJECTION, SOLUTION INTRAVENOUS at 20:17

## 2018-10-31 RX ADMIN — MOMETASONE FUROATE AND FORMOTEROL FUMARATE DIHYDRATE SCH PUFF: 200; 5 AEROSOL RESPIRATORY (INHALATION) at 19:37

## 2018-10-31 NOTE — PRG
DATE OF PROGRESS NOTE:  10/31/2018

 

SUBJECTIVE:  Nivia Eid is in no distress.

 

OBJECTIVE:

GENERAL:  She says she is feeling great.  She is sitting up in a chair. 

VITAL SIGNS:  She is afebrile, heart rate 81, respiratory rate 16, oximetry is 
100%.  We need to begin weaning from oxygen, blood pressure 132/95. 

LUNGS:  Were clear.

HEART:  Regular rhythm.

ABDOMEN:  Soft.

 

LABORATORY DATA:  Hemoglobin 12.1, white count 11.4, platelets 189.  Sodium 134
, potassium 3, chloride 101, bicarbonate 26, BUN 14, creatinine 0.7, glucose 
114.

 

IMPRESSION:

1.  Hypotension with metabolic acidosis on arrival, likely secondary to severe 
diarrhea. 

2.  Acute kidney injury secondary to intravascular volume depletion.

3.  Atrial fibrillation, now in sinus rhythm.

4.  Chronic pain.

5.  Chronic obstructive pulmonary disease, which has not been a clinical issue 
this admission, .  She appears to be improving.  She could probably be moved to 
a telemetry bed.

 

MAKAYLA

## 2018-11-01 LAB
ANION GAP SERPL CALC-SCNC: 11 MMOL/L (ref 10–20)
BUN SERPL-MCNC: 11 MG/DL (ref 9.8–20.1)
CALCIUM SERPL-MCNC: 9 MG/DL (ref 7.8–10.44)
CHLORIDE SERPL-SCNC: 102 MMOL/L (ref 98–107)
CO2 SERPL-SCNC: 30 MMOL/L (ref 23–31)
CREAT CL PREDICTED SERPL C-G-VRATE: 57 ML/MIN (ref 70–130)
GLUCOSE SERPL-MCNC: 118 MG/DL (ref 83–110)
HGB BLD-MCNC: 11.8 G/DL (ref 12–16)
PLATELET # BLD AUTO: 253 THOU/UL (ref 130–400)
POTASSIUM SERPL-SCNC: 3.3 MMOL/L (ref 3.5–5.1)
SODIUM SERPL-SCNC: 140 MMOL/L (ref 136–145)

## 2018-11-01 RX ADMIN — FAMOTIDINE SCH MG: 10 INJECTION, SOLUTION INTRAVENOUS at 08:11

## 2018-11-01 RX ADMIN — Medication SCH ML: at 08:12

## 2018-11-01 RX ADMIN — FAMOTIDINE SCH MG: 10 INJECTION, SOLUTION INTRAVENOUS at 20:02

## 2018-11-01 RX ADMIN — Medication SCH ML: at 20:03

## 2018-11-01 RX ADMIN — ASPIRIN SCH MG: 81 TABLET ORAL at 08:10

## 2018-11-01 RX ADMIN — MOMETASONE FUROATE AND FORMOTEROL FUMARATE DIHYDRATE SCH PUFF: 200; 5 AEROSOL RESPIRATORY (INHALATION) at 06:36

## 2018-11-01 RX ADMIN — MOMETASONE FUROATE AND FORMOTEROL FUMARATE DIHYDRATE SCH PUFF: 200; 5 AEROSOL RESPIRATORY (INHALATION) at 19:06

## 2018-11-01 NOTE — PDOC.PN
- Subjective


Encounter Start Date: 11/01/18


Encounter Start Time: 17:30


Subjective: f/u for NSTEMI, UTI and dehydration. Overall feeling better. No 

recurrent


-: CP. Receiving Cefepime currently.





- Objective


Resuscitation Status: 


 











Resuscitation Status           FULL:Full Resuscitation














MAR Reviewed: Yes


Vital Signs & Weight: 


 Vital Signs (12 hours)











  Temp Pulse Pulse Pulse Resp BP BP


 


 11/01/18 15:10  98.9 F  85    17  


 


 11/01/18 15:05   87    16  


 


 11/01/18 11:38   88    18  


 


 11/01/18 11:00  99.1 F  97    19  


 


 11/01/18 10:34    97  100    136/84


 


 11/01/18 09:10       


 


 11/01/18 09:07  97.8 F  101 H    22 H  


 


 11/01/18 08:09   99     161/87 H 


 


 11/01/18 07:41       


 


 11/01/18 07:31  98.9 F  101 H    18  


 


 11/01/18 06:40       


 


 11/01/18 06:38   94    20  


 


 11/01/18 06:36   94    18  














  BP BP Pulse Ox Pulse Ox Pulse Ox


 


 11/01/18 15:10   110/56 L  94 L  


 


 11/01/18 15:05    91 L  


 


 11/01/18 11:38    92 L  


 


 11/01/18 11:00   136/62  95  


 


 11/01/18 10:34  145/68 H    96  95


 


 11/01/18 09:10    96  


 


 11/01/18 09:07   167/77 H  96  


 


 11/01/18 08:09     


 


 11/01/18 07:41    94 L  


 


 11/01/18 07:31   130/77  94 L  


 


 11/01/18 06:40    96  


 


 11/01/18 06:38    98  


 


 11/01/18 06:36    98  








 Weight











Weight                         133 lb 1 oz














I&O: 


 











 10/31/18 11/01/18 11/02/18





 06:59 06:59 06:59


 


Intake Total 1935.5 1310 643


 


Output Total 500 1000 300


 


Balance 1435.5 310 343











Result Diagrams: 


 10/31/18 03:54





 11/01/18 03:36


Additional Labs: 





Microbiology





10/28/18 19:50   Nasal swab   Influenza Types A,B Direct EIA - Final


10/28/18 15:15   Nasal swab   Influenza Types A,B Direct EIA - Final


10/28/18 14:13   Urine Straight Catheter   Urine Culture - Final


                              Escherichia coli


10/28/18 14:13   Venous blood - Left Arm   Blood Culture - Preliminary


                              Specimen has been received and culture in 

progress.


                              No Growth to date.


10/28/18 14:13   Urine Straight Catheter   Urine Culture - Preliminary


                              Gram Negative Dread


10/28/18 14:10   Venous blood - Left Hand   Blood Culture - Preliminary


                              Specimen has been received and culture in 

progress.


                              No Growth to date.





 Laboratory Tests











  10/28/18 10/28/18 10/28/18





  13:18 13:18 17:56


 


WBC   


 


Neutrophils %   


 


Sodium   135 L 


 


Potassium   


 


Creatinine   1.87 H 


 


Lactic Acid   


 


Troponin I  2.082 H*   2.059 H*


 


TSH 3rd Generation   














  10/28/18 10/28/18 10/28/18





  17:56 17:57 20:20


 


WBC   16.8 H 


 


Neutrophils %   80.7 H 


 


Sodium   


 


Potassium   


 


Creatinine   


 


Lactic Acid  1.4  


 


Troponin I   


 


TSH 3rd Generation    0.4216














  10/28/18 10/29/18 10/29/18





  23:00 01:59 01:59


 


WBC   


 


Neutrophils %    76.4 H


 


Sodium   


 


Potassium   


 


Creatinine   1.12 H 


 


Lactic Acid   


 


Troponin I  1.760 H*  


 


TSH 3rd Generation   














  10/29/18 10/31/18





  01:59 03:54


 


WBC  


 


Neutrophils %  


 


Sodium  


 


Potassium   3.0 L


 


Creatinine  


 


Lactic Acid  


 


Troponin I  1.571 H* 


 


TSH 3rd Generation  











EKG Reviewed by me: Yes (Tele - A-fib in 80's)





Phys Exam





- Physical Examination


Constitutional: NAD


HEENT: PERRLA, sclera anicteric, oral pharynx no lesions


Neck: no nodes, no JVD, supple, full ROM


scattered coarse sounds


S1, S2


Cardiovascular: no significant murmur, no rub, gallop, irregular


Gastrointestinal: soft, non-tender, no distention, positive bowel sounds


Musculoskeletal: no edema, pulses present


Neurological: normal sensation, moves all 4 limbs


Psychiatric: normal affect, A&O x 3


Skin: normal turgor, cap refill <2 seconds





Dx/Plan


(1) NSTEMI (non-ST elevated myocardial infarction)


Code(s): I21.4 - NON-ST ELEVATION (NSTEMI) MYOCARDIAL INFARCTION   Status: 

Acute   Comment: Continue Lovenox, ASA, Lipitor, Cardiology consulted, Echo 

with preserved EF 60-65%, plan for LHC in am   





(2) Acute respiratory failure with hypoxia


Code(s): J96.01 - ACUTE RESPIRATORY FAILURE WITH HYPOXIA   Status: Acute   

Comment: Improved, off BiPAP NIMV, continue O2 via NC   





(3) Acute metabolic encephalopathy


Code(s): G93.41 - METABOLIC ENCEPHALOPATHY   Status: Acute   Comment: Likely 

multifactorial given chronic narcotic use, hypoxia and NSTEMI   





(4) ДМИТРИЙ (acute kidney injury)


Code(s): N17.9 - ACUTE KIDNEY FAILURE, UNSPECIFIED   Status: Acute   Comment: 

Improved, likely due to volume depletion, avoid nephrotoxic meds and limit 

contrast exposure   





(5) Atrial fibrillation with RVR


Code(s): I48.91 - UNSPECIFIED ATRIAL FIBRILLATION   Status: Acute   Comment: 

Converted to SR, continue Cardizem po, preserved EF, anticoagulation with 

Lovenox, pt high risk for OAC given fall risk   





(6) UTI (urinary tract infection)


Status: Acute   Comment: E.coli on Ucx, d/c Cefepime, start Omnicef 300mg BID   





(7) Chronic pain syndrome


Code(s): G89.4 - CHRONIC PAIN SYNDROME   Status: Chronic   Comment: Resume home 

MS Contin, Gabapentin, Norco   





(8) COPD (chronic obstructive pulmonary disease)


Status: Chronic   Comment: Continue Duonebs, Dulera, Proventil   





- Plan


plan discussed w/ family, continue antibiotics, PT/OT, , 

respiratory therapy, out of bed/ambulate, DVT proph w/SCDs


Stable currently


-: continue pulmonary supportive measures


-: Plan for LHC in am 


-: continue ASA, Lipitor


-: KCL 40meq BID





* AM lab: BMP

## 2018-11-01 NOTE — PRG
DATE OF SERVICE:  11/01/2018

 

Ms. Eid is clinically stable.  Her only complaint today is that she 
coughs when she drinks out of a straw.

 

Unfortunately, she is lying at about 15 degrees in bed trying to drink water.

 

PHYSICAL EXAMINATION:

VITAL SIGNS:  She is afebrile, heart rate 74, respiratory rate 20, oximetry is 
96 on 3 liters.

LUNGS:  Remarkable for diffuse wheezes.  These are mild.

CARDIOVASCULAR:  Regular rhythm.

ABDOMEN:  Soft and nontender.

EXTREMITIES:  Without edema or asymmetry.

 

LABORATORY:  Sodium 140, potassium 3.3, chloride 102, bicarbonate 30, BUN 11, 
creatinine 0.76.

 

IMPRESSION:

1.  Metabolic acidosis secondary to severe intravascular volume depletion 
combined with diarrhea, resolved.

2.  Cystitis on meropenem.  I would think her antibiotics could  be changed to 
p.o. antimicrobials.  With simple cystitis, most likely her urinary tract 
infection is already treated as she has had several days of broad spectrum IV 
antibiotics.

3.  Diarrhea, more or less resolved by her history.  I do not see where a C. 
diff was ever collected.  I ordered one in several days back.

4.  Anemia with normal mean corpuscular volume.

5.  Chronic pain, chronic high dose opiate use.

6.  Atrial fibrillation, normal sinus rhythm now.  Decision needs to be made 
whether or not full dose anticoagulation needs to continue.  She is still on 
full dose Lovenox.

 

I will add q.a.m. steroids.  Continue nebulized treatments, add Tessalon Perles 
for cough.  I have instructed her to sit up in bed before she takes water.  She 
may do better just drinking out of a cup.  She says she has had swallowing 
problems in the past.  She is stable to move to telemetry unit in my opinion.

 

MAKAYLA

## 2018-11-02 LAB
ANION GAP SERPL CALC-SCNC: 12 MMOL/L (ref 10–20)
BUN SERPL-MCNC: 15 MG/DL (ref 9.8–20.1)
CALCIUM SERPL-MCNC: 9.5 MG/DL (ref 7.8–10.44)
CHLORIDE SERPL-SCNC: 102 MMOL/L (ref 98–107)
CO2 SERPL-SCNC: 27 MMOL/L (ref 23–31)
CREAT CL PREDICTED SERPL C-G-VRATE: 59 ML/MIN (ref 70–130)
GLUCOSE SERPL-MCNC: 142 MG/DL (ref 83–110)
POTASSIUM SERPL-SCNC: 4.8 MMOL/L (ref 3.5–5.1)
SODIUM SERPL-SCNC: 136 MMOL/L (ref 136–145)

## 2018-11-02 RX ADMIN — MOMETASONE FUROATE AND FORMOTEROL FUMARATE DIHYDRATE SCH PUFF: 200; 5 AEROSOL RESPIRATORY (INHALATION) at 19:28

## 2018-11-02 RX ADMIN — FAMOTIDINE SCH MG: 10 INJECTION, SOLUTION INTRAVENOUS at 20:44

## 2018-11-02 RX ADMIN — Medication SCH ML: at 05:57

## 2018-11-02 RX ADMIN — Medication SCH ML: at 20:47

## 2018-11-02 RX ADMIN — FAMOTIDINE SCH MG: 10 INJECTION, SOLUTION INTRAVENOUS at 05:56

## 2018-11-02 RX ADMIN — ASPIRIN SCH MG: 81 TABLET ORAL at 05:53

## 2018-11-02 RX ADMIN — GUAIFENESIN AND DEXTROMETHORPHAN PRN ML: 100; 10 SYRUP ORAL at 01:05

## 2018-11-02 RX ADMIN — ASPIRIN SCH MG: 81 TABLET ORAL at 09:22

## 2018-11-02 RX ADMIN — MOMETASONE FUROATE AND FORMOTEROL FUMARATE DIHYDRATE SCH PUFF: 200; 5 AEROSOL RESPIRATORY (INHALATION) at 11:57

## 2018-11-02 NOTE — PRG
DATE OF SERVICE:  11/02/2018

 

SUBJECTIVE:  Nivia Eid says she is feeling much better.

 

She is complaining of insomnia.

 

She does not really know whether or not she will be able to quit smoking, but 
she says she is willing to try a patch.

 

PHYSICAL EXAMINATION:

VITAL SIGNS:  She is afebrile, heart rate 78, respiratory rate is 12, oximetry 
is 94% on 3 liters.

LUNGS:  Still remarkable for wheezes.

HEART:  Regular rhythm.

ABDOMEN:  Soft.

 

LABORATORY DATA:  Hemoglobin was 11.8 yesterday.

 

Sodium 136, potassium 4.8, chloride 102, bicarbonate 27, BUN 15, creatinine 
0.76.

 

IMPRESSION:

1.  Chronic obstructive pulmonary disease with ongoing bronchospasm.

2.  Tobacco dependence.

 

PLAN:  Will add a  patch.  Steroids are probably keeping her awake, so I will 
switch her to p.o. prednisone and put in for a sleeping pill.

 

MTDD

## 2018-11-02 NOTE — PDOC.PN
- Subjective


Encounter Start Date: 11/02/18


Encounter Start Time: 14:00


Subjective: f/u for UTI, apparent NSTEMI and resp failure. s/p cardiac cath 

today


-: showing mild disease with recs for med mgmt. No new complaints.





- Objective


Resuscitation Status: 


 











Resuscitation Status           FULL:Full Resuscitation














MAR Reviewed: Yes


Vital Signs & Weight: 


 Vital Signs (12 hours)











  Temp Pulse Resp BP BP Pulse Ox


 


 11/02/18 11:57   78  12   


 


 11/02/18 11:51       94 L


 


 11/02/18 11:48   78  12   


 


 11/02/18 11:05  96.7 F L  72  16   144/70 H  95


 


 11/02/18 07:25  98.4 F  92  17  179/88 H   94 L


 


 11/02/18 05:55   91    


 


 11/02/18 03:42  99.1 F  91  17  140/71   92 L


 


 11/02/18 02:22       92 L








 Weight











Weight                         131 lb 3 oz














I&O: 


 











 11/01/18 11/02/18 11/03/18





 06:59 06:59 06:59


 


Intake Total 1310 1523 


 


Output Total 1000 1020 


 


Balance 310 503 











Result Diagrams: 


 11/01/18 19:52





 11/02/18 04:55


Additional Labs: 





Microbiology





10/28/18 19:50   Nasal swab   Influenza Types A,B Direct EIA - Final


10/28/18 15:15   Nasal swab   Influenza Types A,B Direct EIA - Final


10/28/18 14:13   Urine Straight Catheter   Urine Culture - Final


                              Escherichia coli


10/28/18 14:13   Venous blood - Left Arm   Blood Culture - Preliminary


                              Specimen has been received and culture in 

progress.


                              No Growth to date.


10/28/18 14:13   Urine Straight Catheter   Urine Culture - Preliminary


                              Gram Negative Draed


10/28/18 14:10   Venous blood - Left Hand   Blood Culture - Preliminary


                              Specimen has been received and culture in 

progress.


                              No Growth to date.





 Laboratory Tests











  10/28/18 10/28/18 10/28/18





  13:18 13:18 17:56


 


WBC   


 


Neutrophils %   


 


Sodium   135 L 


 


Potassium   


 


Creatinine   1.87 H 


 


Lactic Acid   


 


Troponin I  2.082 H*   2.059 H*


 


TSH 3rd Generation   














  10/28/18 10/28/18 10/28/18





  17:56 17:57 20:20


 


WBC   16.8 H 


 


Neutrophils %   80.7 H 


 


Sodium   


 


Potassium   


 


Creatinine   


 


Lactic Acid  1.4  


 


Troponin I   


 


TSH 3rd Generation    0.4216














  10/28/18 10/29/18 10/29/18





  23:00 01:59 01:59


 


WBC   


 


Neutrophils %    76.4 H


 


Sodium   


 


Potassium   


 


Creatinine   1.12 H 


 


Lactic Acid   


 


Troponin I  1.760 H*  


 


TSH 3rd Generation   














  10/29/18 10/31/18





  01:59 03:54


 


WBC  


 


Neutrophils %  


 


Sodium  


 


Potassium   3.0 L


 


Creatinine  


 


Lactic Acid  


 


Troponin I  1.571 H* 


 


[facility] 3rd Generation  











Radiology Reviewed by me: Yes (LHC - mild dz RCA, preserved EF)


EKG Reviewed by me: Yes (Tele - SR)





Phys Exam





- Physical Examination


Constitutional: NAD


HEENT: PERRLA, sclera anicteric, oral pharynx no lesions


Neck: no nodes, no JVD, supple, full ROM


Respiratory: no wheezing, no rales, no rhonchi, clear to auscultation bilateral


S1, S2


Cardiovascular: RRR, no significant murmur, no rub, gallop


Gastrointestinal: soft, non-tender, no distention, positive bowel sounds


Musculoskeletal: no edema, pulses present


Neurological: normal sensation, moves all 4 limbs


Psychiatric: A&O x 3


Skin: normal turgor, cap refill <2 seconds





Dx/Plan


(1) Acute respiratory failure with hypoxia


Code(s): J96.01 - ACUTE RESPIRATORY FAILURE WITH HYPOXIA   Status: Acute   

Comment: Improved, off BiPAP NIMV, continue O2 via NC   





(2) Acute metabolic encephalopathy


Code(s): G93.41 - METABOLIC ENCEPHALOPATHY   Status: Acute   Comment: Likely 

multifactorial given chronic narcotic use, hypoxia and NSTEMI   





(3) ДМИТРИЙ (acute kidney injury)


Code(s): N17.9 - ACUTE KIDNEY FAILURE, UNSPECIFIED   Status: Acute   Comment: 

Improved, likely due to volume depletion, avoid nephrotoxic meds and limit 

contrast exposure   





(4) Atrial fibrillation with RVR


Code(s): I48.91 - UNSPECIFIED ATRIAL FIBRILLATION   Status: Acute   Comment: 

Converted to SR, continue Cardizem po, preserved EF, Eliquis currently but 

remains fall risk   





(5) UTI (urinary tract infection)


Status: Acute   Comment: E.coli on Ucx, d/c Cefepime, start Omnicef 300mg BID   





(6) Chronic pain syndrome


Code(s): G89.4 - CHRONIC PAIN SYNDROME   Status: Chronic   Comment: Resume home 

MS Contin, Gabapentin, Norco   





(7) COPD (chronic obstructive pulmonary disease)


Status: Chronic   Comment: Continue Duonebs, Dulera, Proventil   





(8) CAD (coronary artery disease)


Code(s): I25.10 - ATHSCL HEART DISEASE OF NATIVE CORONARY ARTERY W/O ANG PCTRS 

  Status: Chronic   Comment: Mild dz on LHC, med mgmt   





- Plan


continue antibiotics, PT/OT, , out of bed/ambulate, DVT proph w/

SCDs


Stable overall


-: Continue Omnicef


-: ASA, Lipitor


-: OOB/PT


-: AM lab: H/H





* Likely home in 24-48h

## 2018-11-03 LAB
HGB BLD-MCNC: 11.8 G/DL (ref 12–16)
PLATELET # BLD AUTO: 347 THOU/UL (ref 130–400)

## 2018-11-03 RX ADMIN — MOMETASONE FUROATE AND FORMOTEROL FUMARATE DIHYDRATE SCH PUFF: 200; 5 AEROSOL RESPIRATORY (INHALATION) at 06:43

## 2018-11-03 RX ADMIN — FAMOTIDINE SCH MG: 10 INJECTION, SOLUTION INTRAVENOUS at 08:31

## 2018-11-03 RX ADMIN — MOMETASONE FUROATE AND FORMOTEROL FUMARATE DIHYDRATE SCH PUFF: 200; 5 AEROSOL RESPIRATORY (INHALATION) at 18:57

## 2018-11-03 RX ADMIN — Medication SCH ML: at 20:31

## 2018-11-03 RX ADMIN — Medication SCH ML: at 08:31

## 2018-11-03 RX ADMIN — ASPIRIN SCH MG: 81 TABLET ORAL at 08:30

## 2018-11-03 NOTE — PDOC.PN
- Subjective


Encounter Start Date: 11/03/18


Encounter Start Time: 08:00





Pt seen for followup re: acute hypoxic respiratory failure.  Reports right 

groin pain.  No chest pain, shortness of breath, fevers or chills.





- Objective


Resuscitation Status: 


 











Resuscitation Status           FULL:Full Resuscitation














MAR Reviewed: Yes


Vital Signs & Weight: 


 Vital Signs (12 hours)











  Temp Pulse Resp BP BP BP Pulse Ox


 


 11/03/18 11:52  98 F  94  18  138/90    95


 


 11/03/18 10:18   94  18     94 L


 


 11/03/18 08:30   97     


 


 11/03/18 08:00        95


 


 11/03/18 07:53  98.4 F  97  18    113/77  95


 


 11/03/18 06:46        93 L


 


 11/03/18 06:45   80  18     93 L


 


 11/03/18 06:43   80  18     93 L


 


 11/03/18 04:00  97.8 F  96  18   151/85 H   95


 


 11/03/18 02:11        92 L








 Weight











Weight                         129 lb














I&O: 


 











 11/02/18 11/03/18 11/04/18





 06:59 06:59 05:59


 


Intake Total 1523 1920 


 


Output Total 1020 1425 


 


Balance 503 495 











Result Diagrams: 


 11/01/18 19:52





 11/02/18 04:55


EKG Reviewed by me: Yes (Tele: michele)





Phys Exam





- Physical Examination


Constitutional: NAD


HEENT: moist MMs


Neck: supple


Bibasal crackles


Cardiovascular: irregular


Gastrointestinal: soft


right groin tenderness


Neurological: moves all 4 limbs


Psychiatric: normal affect





Dx/Plan


(1) Acute respiratory failure with hypoxia


Code(s): J96.01 - ACUTE RESPIRATORY FAILURE WITH HYPOXIA   Status: Acute   

Comment: Improved   





(2) Atrial fibrillation with RVR


Code(s): I48.91 - UNSPECIFIED ATRIAL FIBRILLATION   Status: Acute   Comment: 

continue cardizem   





(3) CAD (coronary artery disease)


Code(s): I25.10 - ATHSCL HEART DISEASE OF NATIVE CORONARY ARTERY W/O ANG PCTRS 

  Status: Chronic   Comment: For med mgmt   





(4) Hypertension


Code(s): I10 - ESSENTIAL (PRIMARY) HYPERTENSION   Status: Chronic   


Plan: 


controlled


Comment: controlled   





- Plan





* .








Review of Systems





- Review of Systems


Respiratory: Cough, Dry.  negative: Shortness of Breath, SOB with Excertion, 

Pleuritic Pain, Wheezing


Cardiovascular: negative: chest pain, palpitations, orthopnea, paroxysmal 

nocturnal dyspnea, edema, light headedness


Musculoskeletal: Other (right groin pain)





- Medications/Allergies


Allergies/Adverse Reactions: 


 Allergies











Allergy/AdvReac Type Severity Reaction Status Date / Time


 


hydralazine Allergy   Verified 01/27/15 22:10











Medications: 


 Current Medications





Acetaminophen (Tylenol)  650 mg PO Q4H PRN


   PRN Reason: Headache/Fever/Mild Pain (1-3)


   Last Admin: 11/01/18 11:08 Dose:  650 mg


Acetaminophen/Codeine Phosphate (Tylenol #3)  1 tab PO Q4H PRN


   PRN Reason: Mild Pain (1-3)


Acetaminophen/Codeine Phosphate (Tylenol #3)  2 tab PO Q4H PRN


   PRN Reason: Moderate Pain (4-6)


Hydrocodone Bitart/Acetaminophen (Norco 10/325)  1 tab PO Q6H PRN


   PRN Reason: Moderate Pain (4-6)


   Last Admin: 10/29/18 15:00 Dose:  1 tab


Albuterol Sulfate (Proventil Hfa)  2 puff INH Q6HR PRN


   PRN Reason: SOB &/or Wheezing


Albuterol/Ipratropium (Duoneb)  3 ml NEB [id number] [facility]


   Last Admin: 11/03/18 10:18 Dose:  3 ml


Apixaban (Eliquis)  5 mg PO BID [facility]


Aspirin (Ecotrin)  81 mg PO DAILY [facility]


   Last Admin: 11/03/18 08:30 Dose:  81 mg


Atorvastatin Calcium (Lipitor)  20 mg PO HS [facility]


   Last Admin: 11/02/18 20:44 Dose:  20 mg


Benzonatate (Tessalon)  200 mg PO TID [facility]


   Last Admin: 11/03/18 08:31 Dose:  200 mg


Cefdinir (Omnicef)  300 mg PO BID [facility]


   Last Admin: 11/03/18 08:30 Dose:  300 mg


Diltiazem HCl (Cardizem Cd)  240 mg PO DAILY [facility]


   Last Admin: 11/03/18 08:30 Dose:  240 mg


Famotidine (Pepcid)  20 mg SLOW IVP Q12HR [facility]


   Last Admin: 11/03/18 08:31 Dose:  20 mg


Gabapentin (Neurontin)  600 mg PO QID [facility]


   Last Admin: 11/03/18 11:56 Dose:  600 mg


Guaifenesin/Dextromethorphan (Robitussin Dm)  15 ml PO Q4H PRN


   PRN Reason: Cough


   Last Admin: 11/02/18 01:05 Dose:  15 ml


Levothyroxine Sodium (Synthroid)  50 mcg PO 0600 [facility]


   Last Admin: 11/03/18 06:45 Dose:  50 mcg


Mometasone Furoate/Formoterol Fumar (Dulera 200 Mcg/5 Mcg Inhaler)  1 puff INH 

BID-RT [facility]


   Last Admin: 11/03/18 06:43 Dose:  1 puff


Morphine Sulfate (Ms Contin)  60 mg PO 0400,1200,2000 [facility]


   Last Admin: 11/03/18 11:55 Dose:  60 mg


Morphine Sulfate (Morphine)  6 mg SLOW IVP Q4H PRN


   PRN Reason: Pain


   Last Admin: 10/29/18 12:49 Dose:  6 mg


Nicotine (Nicoderm Patch)  14 mg TD Q24HR [facility]


   Last Admin: 11/02/18 15:54 Dose:  14 mg


Nitroglycerin (Nitrostat)  0.4 mg SL Q5MIN PRN


   PRN Reason: Chest Pain


Ondansetron HCl (Zofran)  4 mg SLOW IVP Q6H PRN


   PRN Reason: Nausea


   Last Admin: 10/29/18 11:06 Dose:  4 mg


Potassium Chloride (K-Dur)  40 meq PO BID-[facility]


   Last Admin: 11/03/18 08:30 Dose:  40 meq


Prednisone (Prednisone)  20 mg PO QAM-WM [facility]


   Last Admin: 11/03/18 08:30 Dose:  20 mg


Senna/Docusate Sodium (Senokot S)  2 tab PO BID PRN


   PRN Reason: Constipation


Sertraline HCl (Zoloft)  50 mg PO DAILY [facility]


   Last Admin: 11/03/18 08:31 Dose:  50 mg


Sodium Chloride (Flush - Normal Saline)  10 ml IVF Q12HR [facility]


   Last Admin: 11/03/18 08:31 Dose:  10 ml


Sodium Chloride (Flush - Normal Saline)  10 ml IVF PRN PRN


   PRN Reason: Saline Flush


Temazepam (Restoril)  15 mg PO HSPRN PRN


   PRN Reason: Insomnia


   Last Admin: 10/30/18 22:11 Dose:  15 mg


Tramadol HCl (Ultram)  50 mg PO Q6H PRN


   PRN Reason: Moderate Pain (4-6)

## 2018-11-03 NOTE — PDOC.CTH
Cardiology Progress Note





- Subjective





Patient with c/o cough causing urinary incontinence. Also c/o severe groin pain 

at cath site. 





- Objective


 Vital Signs











  Temp Pulse Resp BP BP Pulse Ox


 


 11/03/18 08:30   97    


 


 11/03/18 07:53  98.4 F  97  18   113/77  95


 


 11/03/18 06:46       93 L


 


 11/03/18 06:45   80  18    93 L


 


 11/03/18 06:43   80  18    93 L


 


 11/03/18 04:00  97.8 F  96  18  151/85 H   95


 


 11/03/18 02:11       92 L


 


 11/02/18 22:45       92 L








 











Weight                         129 lb














 











 11/02/18 11/03/18 11/04/18





 06:59 06:59 05:59


 


Intake Total 1523 1920 


 


Output Total 1020 1425 


 


Balance 503 495 














- Physical Examination


General/Neuro: alert & oriented x3


Neck: no JVD present


Lungs: other: (tight)


Heart: other: (IRR)


Abdomen: NT/ND


Other PE findings: Right groin TTP





- Telemetry


Telemetry Rhythm: AF





- Labs


Result Diagrams: 


 11/01/18 19:52





 11/02/18 04:55


 Troponin/CKMB











Troponin I  1.571 ng/mL (< 0.028)  H*  10/29/18  01:59    














- Assessment/Plan





1. Right groin pain s/p cath


2. paroxysmal AF


3. NSTEMI - mild CAD on cath. Probable demand ischemia


4. Sepsis at presentation


5. URI





Stable cardiac status. Still in/out of AF. ? add Multaq. Unsure if patient can 

afford. Per nursing she has been converting in and out since admission so will 

continue cardizem only for now. Increase Eliquis to therapeutic dosage as she 

is currently underdosed only placing her at a risk of bleeding with CVA 

prevention. US right groin given TTP out of proportion. No swelling suggestive 

of bleed, but will double check at patient request. Cough treatment per 

pulmonary.

## 2018-11-03 NOTE — PRG
DATE OF SERVICE:  11/03/2018

 

SUBJECTIVE:  Ms. Eid is complaining of back pain after going for a walk with physical therapy marcos worthington.  Other than that, she had no complaints.

 

OBJECTIVE:

VITAL SIGNS:  She is afebrile, heart rate is 94, respiratory rate is 18, oximetry is 95 on 3 liters, 
blood pressure is 138/90.

LUNGS:  Were actually remarkable for less wheezes than in the last few days.

HEART:  Regular rhythm.

ABDOMEN:  Soft.

 

She did not ask for a sleeping pill last night, so she did not sleep much, but she does nap on and of
f all day and I have explained to her this is why she cannot sleep at night.

 

IMPRESSION:

1.  Chronic obstructive pulmonary disease, not a real factor in this admission with some bronchospasm
.

2.  Tobacco use up until this admission.  Now, the patch on.

3.  Diarrhea illness leading to intravascular volume depletion and metabolic acidosis, resolved.

4.  Atrial fibrillation.

5.  History of coronary artery disease.

6.  Hypertension, reasonably well controlled with some breakthrough elevated blood pressures, last on
e was probably after exercise.  We will continue to follow.

## 2018-11-03 NOTE — ULT
ULTRASOUND EXTREMITY NONVASCULAR LIMITED:

(right groin ultrasound)

 

HISTORY: 

The patient is status post cath performed 2 days ago.  Right groin pain and tenderness.

 

FINDINGS/IMPRESSION: 

There is normal flow in the common femoral artery and flow in the right groin.  No cystic mass with f
low is demonstrated to suggest pseudoaneurysm formation.  

 

POS: [facility]

## 2018-11-04 RX ADMIN — GUAIFENESIN AND DEXTROMETHORPHAN PRN ML: 100; 10 SYRUP ORAL at 23:09

## 2018-11-04 RX ADMIN — MOMETASONE FUROATE AND FORMOTEROL FUMARATE DIHYDRATE SCH PUFF: 200; 5 AEROSOL RESPIRATORY (INHALATION) at 06:51

## 2018-11-04 RX ADMIN — MOMETASONE FUROATE AND FORMOTEROL FUMARATE DIHYDRATE SCH PUFF: 200; 5 AEROSOL RESPIRATORY (INHALATION) at 18:54

## 2018-11-04 RX ADMIN — GUAIFENESIN AND DEXTROMETHORPHAN PRN ML: 100; 10 SYRUP ORAL at 01:35

## 2018-11-04 RX ADMIN — ASPIRIN SCH MG: 81 TABLET ORAL at 08:04

## 2018-11-04 RX ADMIN — Medication SCH ML: at 08:05

## 2018-11-04 RX ADMIN — Medication SCH: at 22:06

## 2018-11-04 NOTE — PRG
DATE OF SERVICE:  11/04/2018

 

SUBJECTIVE:  Nivia Eid has no complaints other than back pain.

 

OBJECTIVE:

VITAL SIGNS:  She is afebrile, heart rate is  today, respiratory rate is 16, oximetry is 96 on 
3 liters, blood pressure is 169/89, heart rate was down to 102 after lunch.

LUNGS:  Distant, clear.

HEART:  Regular rhythm.

ABDOMEN:  Soft.

 

IMPRESSION:

1.  Intermittent atrial fibrillation.

2.  Chronic obstructive pulmonary disease, which is clinically stable at this point in time.

3.  Deconditioning.

4.  Chronic back pain on long-acting morphine.

5.  Status post admission with severe diarrhea and a metabolic acidosis, most likely secondary to bic
arbonate losses with her diarrhea.

6.  Intravascular volume depletion on presentation, resolved.

 

PLAN:  Continue current supportive care.

## 2018-11-04 NOTE — PDOC.CTH
<Gely Rodriguez - Last Filed: 11/04/18 09:32>





Cardiology Progress Note





- Subjective





Still with c/o cough, but otherwise ok. In/out of AF with RVR and what appears 

to be AFl.





- Objective


 Vital Signs











  Temp Pulse Resp BP BP Pulse Ox


 


 11/04/18 08:04   88    


 


 11/04/18 07:03  98.2 F  88  16   176/82 H  99


 


 11/04/18 06:51   90  14   


 


 11/04/18 03:25  98.5 F  85  18  132/71   92 L


 


 11/04/18 01:01 CST       95








 











Weight                         131 lb














 











 11/03/18 11/04/18 11/05/18





 07:59 06:59 06:59


 


Intake Total   


 


Output Total   


 


Balance   














- Physical Examination


General/Neuro: alert & oriented x3


Neck: no JVD present


Lungs: CTA


Heart: other: (IRR)


Abdomen: NT/ND





- Telemetry


Telemetry Rhythm: AF/SR/AFl





- Labs


Result Diagrams: 


 11/03/18 19:57





 11/02/18 04:55


 Troponin/CKMB











Troponin I  1.571 ng/mL (< 0.028)  H*  10/29/18  01:59    














- Assessment/Plan





1. Right groin pain s/p cath - negative groin US


2. paroxysmal AF/Fl


3. NSTEMI - mild CAD on cath. Probable demand ischemia


4. Sepsis at presentation


5. URI


6. COPD





Doing better, but still breaking through with AF/Fl with RVR. Will rate-control 

for now. Dr. Romano back tomorrow and can determine if patient needs Multaq 

vs other antiarrhythmic or EP consult.








<Raul Howard - Last Filed: 11/04/18 14:30>





Cardiology Progress Note





- Objective


 Vital Signs











  Temp Pulse Pulse Pulse Resp BP BP


 


 11/04/18 13:51   80    16  


 


 11/04/18 12:48    102 H  94   169/89 H  125/64


 


 11/04/18 11:33  97.8 F  111 H    16  


 


 11/04/18 08:04   88     


 


 11/04/18 07:03  98.2 F  88    16  


 


 11/04/18 06:51   90    14  


 


 11/04/18 03:25  98.5 F  85    18  














  BP BP Pulse Ox Pulse Ox Pulse Ox


 


 11/04/18 13:51     


 


 11/04/18 12:48     94 L  97


 


 11/04/18 11:33  123/69   96  


 


 11/04/18 08:04     


 


 11/04/18 07:03   176/82 H  99  


 


 11/04/18 06:51     


 


 11/04/18 03:25  132/71   92 L  








 











Weight                         131 lb














 











 11/03/18 11/04/18 11/05/18





 07:59 06:59 06:59


 


Intake Total   


 


Output Total   


 


Balance   














- Labs


Result Diagrams: 


 11/03/18 19:57





 11/02/18 04:55


 Troponin/CKMB











Troponin I  1.571 ng/mL (< 0.028)  H*  10/29/18  01:59    














- Assessment/Plan





Pt seen and examined.  IMproving.  No pain to right groin.  


Mild CAD


Abx

## 2018-11-04 NOTE — PDOC.PN
- Subjective


Encounter Start Date: 11/04/18


Encounter Start Time: 08:20





Pt seen for followup re: acute hypoxic resp failure.  Reports feeling weak.  No 

nausea or vomiting.





- Objective


Resuscitation Status: 


 











Resuscitation Status           FULL:Full Resuscitation














Vital Signs & Weight: 


 Vital Signs (12 hours)











  Temp Pulse Resp BP BP Pulse Ox


 


 11/04/18 08:04   88    


 


 11/04/18 07:03  98.2 F  88  16   176/82 H  99


 


 11/04/18 06:51   90  14   


 


 11/04/18 03:25  98.5 F  85  18  132/71   92 L


 


 11/04/18 01:01 CST       95








 Weight











Weight                         131 lb














I&O: 


 











 11/03/18 11/04/18 11/05/18





 07:59 06:59 06:59


 


Intake Total   


 


Output Total   


 


Balance   











Result Diagrams: 


 11/03/18 19:57





 11/02/18 04:55





Phys Exam





- Physical Examination


Constitutional: NAD


HEENT: moist MMs


Neck: supple


Respiratory: clear to auscultation bilateral


Cardiovascular: RRR


Gastrointestinal: soft


Neurological: moves all 4 limbs


Psychiatric: normal affect





Dx/Plan


(1) Acute respiratory failure with hypoxia


Code(s): J96.01 - ACUTE RESPIRATORY FAILURE WITH HYPOXIA   Status: Acute   

Comment: Improved   





(2) Atrial fibrillation with RVR


Code(s): I48.91 - UNSPECIFIED ATRIAL FIBRILLATION   Status: Acute   Comment: on 

cardizem, currently in sinus rhythm   





(3) CAD (coronary artery disease)


Code(s): I25.10 - ATHSCL HEART DISEASE OF NATIVE CORONARY ARTERY W/O ANG PCTRS 

  Status: Chronic   Comment: for medical management   





(4) Hypertension


Code(s): I10 - ESSENTIAL (PRIMARY) HYPERTENSION   Status: Chronic   Comment: 

controlled   





- Plan





* .








Review of Systems





- Review of Systems


Constitutional: weakness


Respiratory: SOB with Excertion.  negative: Cough, Shortness of Breath, 

Pleuritic Pain, Wheezing


Cardiovascular: negative: chest pain, palpitations, orthopnea, paroxysmal 

nocturnal dyspnea, edema, light headedness





- Medications/Allergies


Allergies/Adverse Reactions: 


 Allergies











Allergy/AdvReac Type Severity Reaction Status Date / Time


 


hydralazine Allergy   Verified 01/27/15 22:10











Medications: 


 Current Medications





Acetaminophen (Tylenol)  650 mg PO Q4H PRN


   PRN Reason: Headache/Fever/Mild Pain (1-3)


   Last Admin: 11/01/18 11:08 Dose:  650 mg


Acetaminophen/Codeine Phosphate (Tylenol #3)  1 tab PO Q4H PRN


   PRN Reason: Mild Pain (1-3)


Acetaminophen/Codeine Phosphate (Tylenol #3)  2 tab PO Q4H PRN


   PRN Reason: Moderate Pain (4-6)


Hydrocodone Bitart/Acetaminophen (Norco 10/325)  1 tab PO Q6H PRN


   PRN Reason: Moderate Pain (4-6)


   Last Admin: 10/29/18 15:00 Dose:  1 tab


Albuterol Sulfate (Proventil Hfa)  2 puff INH Q6HR PRN


   PRN Reason: SOB &/or Wheezing


Albuterol/Ipratropium (Duoneb)  3 ml NEB [id number] [facility]


   Last Admin: 11/04/18 10:35 Dose:  Not Given


Apixaban (Eliquis)  5 mg PO BID [facility]


   Last Admin: 11/04/18 08:04 Dose:  5 mg


Aspirin (Ecotrin)  81 mg PO DAILY [facility]


   Last Admin: 11/04/18 08:04 Dose:  81 mg


Atorvastatin Calcium (Lipitor)  20 mg PO HS [facility]


   Last Admin: 11/03/18 20:30 Dose:  20 mg


Benzonatate (Tessalon)  200 mg PO TID [facility]


   Last Admin: 11/04/18 08:03 Dose:  200 mg


Cefdinir (Omnicef)  300 mg PO BID [facility]


   Last Admin: 11/04/18 08:04 Dose:  300 mg


Diltiazem HCl (Cardizem Cd)  240 mg PO DAILY [facility]


   Last Admin: 11/04/18 08:04 Dose:  240 mg


Famotidine (Pepcid)  20 mg PO BID [facility]


   Last Admin: 11/04/18 08:04 Dose:  20 mg


Gabapentin (Neurontin)  600 mg PO QID [facility]


   Last Admin: 11/04/18 08:03 Dose:  600 mg


Guaifenesin/Dextromethorphan (Robitussin Dm)  15 ml PO Q4H PRN


   PRN Reason: Cough


   Last Admin: 11/04/18 01:35 CST Dose:  15 ml


Levothyroxine Sodium (Synthroid)  50 mcg PO 0600 [facility]


   Last Admin: 11/04/18 04:17 Dose:  50 mcg


Mometasone Furoate/Formoterol Fumar (Dulera 200 Mcg/5 Mcg Inhaler)  1 puff INH 

BID-RT [facility]


   Last Admin: 11/04/18 06:51 Dose:  1 puff


Morphine Sulfate (Ms Contin)  60 mg PO 0400,1200,2000 [facility]


   Last Admin: 11/04/18 04:17 Dose:  60 mg


Morphine Sulfate (Morphine)  6 mg SLOW IVP Q4H PRN


   PRN Reason: Pain


   Last Admin: 10/29/18 12:49 Dose:  6 mg


Nicotine (Nicoderm Patch)  14 mg TD Q24HR [facility]


   Last Admin: 11/03/18 15:31 Dose:  14 mg


Nitroglycerin (Nitrostat)  0.4 mg SL Q5MIN PRN


   PRN Reason: Chest Pain


Ondansetron HCl (Zofran)  4 mg SLOW IVP Q6H PRN


   PRN Reason: Nausea


   Last Admin: 10/29/18 11:06 Dose:  4 mg


Potassium Chloride (K-Dur)  40 meq PO BID-[facility]


   Last Admin: 11/04/18 08:05 Dose:  40 meq


Prednisone (Prednisone)  20 mg PO QAM-[facility]


   Last Admin: 11/04/18 08:05 Dose:  20 mg


Senna/Docusate Sodium (Senokot S)  2 tab PO BID PRN


   PRN Reason: Constipation


Sertraline HCl (Zoloft)  50 mg PO DAILY [facility]


   Last Admin: 11/04/18 08:04 Dose:  50 mg


Sodium Chloride (Flush - Normal Saline)  10 ml IVF Q12HR [facility]


   Last Admin: 11/04/18 08:05 Dose:  10 ml


Sodium Chloride (Flush - Normal Saline)  10 ml IVF PRN PRN


   PRN Reason: Saline Flush


Temazepam (Restoril)  15 mg PO HSPRN PRN


   PRN Reason: Insomnia


   Last Admin: 11/04/18 00:05 Dose:  15 mg


Tramadol HCl (Ultram)  50 mg PO Q6H PRN


   PRN Reason: Moderate Pain (4-6)

## 2018-11-05 LAB
HGB BLD-MCNC: 13.6 G/DL (ref 12–16)
PLATELET # BLD AUTO: 499 THOU/UL (ref 130–400)

## 2018-11-05 RX ADMIN — Medication SCH ML: at 20:56

## 2018-11-05 RX ADMIN — Medication SCH ML: at 08:43

## 2018-11-05 RX ADMIN — ASPIRIN SCH MG: 81 TABLET ORAL at 08:43

## 2018-11-05 RX ADMIN — MOMETASONE FUROATE AND FORMOTEROL FUMARATE DIHYDRATE SCH PUFF: 200; 5 AEROSOL RESPIRATORY (INHALATION) at 18:01

## 2018-11-05 RX ADMIN — MOMETASONE FUROATE AND FORMOTEROL FUMARATE DIHYDRATE SCH PUFF: 200; 5 AEROSOL RESPIRATORY (INHALATION) at 07:13

## 2018-11-05 RX ADMIN — HYDROCODONE BITARTRATE AND ACETAMINOPHEN PRN TAB: 10; 325 TABLET ORAL at 15:46

## 2018-11-05 NOTE — PQF
DATE:      11-5-18                                              ATTN: DR. BRANDI LE



Please exercise your independent, professional judgment in responding to the 
clarification form. 

Clinical indicators are provided on the bottom of this form for your review



Please check appropriate box(s) to clarify if the following diagnosis has been 
ruled in or  ruled out:     NSTEMI



[  ] Ruled in diagnosis

     [  ] Continue to treat        [  ] Resolved

[  ] Ruled out diagnosis

[ X ] Other diagnosis ___demand ischemia________

[  ] Unable to determine



In addition, please specify:

Present on Admission (POA):  [  ] Yes             [  ] No             [  ] 
Unable to determine



For continuity of documentation, please document condition throughout progress 
notes and discharge summary.  Thank You.



CLINICAL INDICATORS - SIGNS / SYMPTOMS / LABS



ER DX:   SEPSIS,  HYPOXIC RESPIRATORY FAILURE, LACTIC ACIDOSIS,  NSTEMI



H&P: 

 ACUTE HYPOXIC RESPIRATORY FAILURE,  SEPSIS,  NON ST ELEVATION MI, ACUTE 
METABOLIC ENCEPHALOPATHY



TROP:   10-28-18:   2.059

                             1.760

             10-29-18:   1.571



CONSULT NOTE DR. ARMENTA 10-29-18:   SEPSIS,  NON Q-WAVE MI



PN DR. KIM 10-30-18:   NSTEMI



RISK FACTORS:   H&P:   HX HTN, THROID DISEASE,  OSTEOPOROSIS, HX OF LUNG CANCER
, SMOKER



TREATMENTS:  

  H&P:   CARDIOLOGY CONSULT, WE WILL PUT HER ON HEPARIN.  WE WILL PUT ON 
ASPIRIN AND PUT

             HER ON A STATIN AND CONTINUE TO MONITOR.



(This form is maintained as a part of the permanent medical record)

 2015 [employer].  All Rights Reserved

DANIELA [email or web address]    Office:  [phone]

                                                              

[facility]

## 2018-11-05 NOTE — PDOC.PN
- Subjective


Encounter Start Date: 11/05/18


Encounter Start Time: 07:20





Pt seen for followup re: acute hypoxic respiratory failure.  Feels better.  No 

nausea or vomiting.





- Objective


Resuscitation Status: 


 











Resuscitation Status           FULL:Full Resuscitation














MAR Reviewed: Yes


Vital Signs & Weight: 


 Vital Signs (12 hours)











  Temp Pulse Resp BP Pulse Ox


 


 11/05/18 11:18  98.2 F  86  18  103/59 L  94 L


 


 11/05/18 10:36   82  16   94 L


 


 11/05/18 08:42   78   


 


 11/05/18 07:15  97.5 F L  78  16  173/80 H  94 L


 


 11/05/18 07:11   84  16   94 L


 


 11/05/18 04:10  98.7 F  80  19  127/66  96


 


 11/05/18 01:35   78  18   93 L








 Weight











Weight                         132 lb 8 oz














I&O: 


 











 11/04/18 11/05/18 11/06/18





 06:59 06:59 06:59


 


Intake Total  1450 


 


Output Total  1900 


 


Balance  -450 











Result Diagrams: 


 11/03/18 19:57





 11/02/18 04:55


EKG Reviewed by me: Yes (Tele:  NSR)





Phys Exam





- Physical Examination


Constitutional: NAD


HEENT: moist MMs


Neck: supple


Respiratory: clear to auscultation bilateral


Cardiovascular: RRR


Gastrointestinal: soft


Neurological: moves all 4 limbs


Psychiatric: normal affect





Dx/Plan


(1) Acute respiratory failure with hypoxia


Code(s): J96.01 - ACUTE RESPIRATORY FAILURE WITH HYPOXIA   Status: Acute   

Comment: Improved   





(2) Atrial fibrillation with RVR


Code(s): I48.91 - UNSPECIFIED ATRIAL FIBRILLATION   Status: Acute   Comment: 

continue cardizem   





(3) CAD (coronary artery disease)


Code(s): I25.10 - ATHSCL HEART DISEASE OF NATIVE CORONARY ARTERY W/O ANG PCTRS 

  Status: Chronic   Comment: for medical management.  Mild CAD on cath.  

Troponin elevation likely due to demand ischemia.   





(4) Hypertension


Code(s): I10 - ESSENTIAL (PRIMARY) HYPERTENSION   Status: Chronic   Comment: 

controlled   





- Plan





* .








Review of Systems





- Review of Systems


Respiratory: SOB with Excertion.  negative: Cough, Shortness of Breath, 

Pleuritic Pain, Wheezing


Cardiovascular: negative: chest pain, palpitations, orthopnea, paroxysmal 

nocturnal dyspnea, edema, light headedness, other





- Medications/Allergies


Allergies/Adverse Reactions: 


 Allergies











Allergy/AdvReac Type Severity Reaction Status Date / Time


 


hydralazine Allergy   Verified 01/27/15 22:10











Medications: 


 Current Medications





Acetaminophen (Tylenol)  650 mg PO Q4H PRN


   PRN Reason: Headache/Fever/Mild Pain (1-3)


   Last Admin: 11/04/18 17:01 Dose:  650 mg


Acetaminophen/Codeine Phosphate (Tylenol #3)  1 tab PO Q4H PRN


   PRN Reason: Mild Pain (1-3)


Acetaminophen/Codeine Phosphate (Tylenol #3)  2 tab PO Q4H PRN


   PRN Reason: Moderate Pain (4-6)


Hydrocodone Bitart/Acetaminophen (Norco 10/325)  1 tab PO Q6H PRN


   PRN Reason: Moderate Pain (4-6)


   Last Admin: 10/29/18 15:00 Dose:  1 tab


Albuterol Sulfate (Proventil Hfa)  2 puff INH Q6HR PRN


   PRN Reason: SOB &/or Wheezing


Albuterol/Ipratropium (Duoneb)  3 ml NEB [id number] [facility]


   Last Admin: 11/05/18 10:36 Dose:  3 ml


Apixaban (Eliquis)  5 mg PO BID [facility]


   Last Admin: 11/05/18 08:43 Dose:  5 mg


Aspirin (Ecotrin)  81 mg PO DAILY [facility]


   Last Admin: 11/05/18 08:43 Dose:  81 mg


Atorvastatin Calcium (Lipitor)  20 mg PO HS [facility]


   Last Admin: 11/04/18 20:30 Dose:  20 mg


Benzonatate (Tessalon)  200 mg PO TID [facility]


   Last Admin: 11/05/18 08:42 Dose:  200 mg


Cefdinir (Omnicef)  300 mg PO BID [facility]


   Last Admin: 11/05/18 08:42 Dose:  300 mg


Diltiazem HCl (Cardizem Cd)  240 mg PO DAILY [facility]


   Last Admin: 11/05/18 08:42 Dose:  240 mg


Famotidine (Pepcid)  20 mg PO BID [facility]


   Last Admin: 11/05/18 08:43 Dose:  20 mg


Gabapentin (Neurontin)  600 mg PO QID [facility]


   Last Admin: 11/05/18 08:43 Dose:  600 mg


Guaifenesin/Dextromethorphan (Robitussin Dm)  15 ml PO Q4H PRN


   PRN Reason: Cough


   Last Admin: 11/04/18 23:09 Dose:  15 ml


Levothyroxine Sodium (Synthroid)  50 mcg PO 0600 [facility]


   Last Admin: 11/05/18 05:08 Dose:  50 mcg


Mometasone Furoate/Formoterol Fumar (Dulera 200 Mcg/5 Mcg Inhaler)  1 puff INH 

BID-RT [facility]


   Last Admin: 11/05/18 07:13 Dose:  1 puff


Morphine Sulfate (Ms Contin)  60 mg PO 0400,1200,2000 [facility]


   Last Admin: 11/05/18 05:08 Dose:  60 mg


Morphine Sulfate (Morphine)  6 mg SLOW IVP Q4H PRN


   PRN Reason: Pain


   Last Admin: 10/29/18 12:49 Dose:  6 mg


Nicotine (Nicoderm Patch)  14 mg TD Q24HR [facility]


   Last Admin: 11/04/18 14:07 Dose:  14 mg


Nitroglycerin (Nitrostat)  0.4 mg SL Q5MIN PRN


   PRN Reason: Chest Pain


Ondansetron HCl (Zofran)  4 mg SLOW IVP Q6H PRN


   PRN Reason: Nausea


   Last Admin: 10/29/18 11:06 Dose:  4 mg


Potassium Chloride (K-Dur)  40 meq PO BID-[facility]


   Last Admin: 11/05/18 08:42 Dose:  40 meq


Prednisone (Prednisone)  20 mg PO QAM-[facility]


   Last Admin: 11/05/18 08:42 Dose:  20 mg


Senna/Docusate Sodium (Senokot S)  2 tab PO BID PRN


   PRN Reason: Constipation


Sertraline HCl (Zoloft)  50 mg PO DAILY [facility]


   Last Admin: 11/05/18 08:43 Dose:  50 mg


Sodium Chloride (Flush - Normal Saline)  10 ml IVF Q12HR [facility]


   Last Admin: 11/05/18 08:43 Dose:  10 ml


Sodium Chloride (Flush - Normal Saline)  10 ml IVF PRN PRN


   PRN Reason: Saline Flush


Temazepam (Restoril)  15 mg PO HSPRN PRN


   PRN Reason: Insomnia


   Last Admin: 11/04/18 23:09 Dose:  15 mg


Tramadol HCl (Ultram)  50 mg PO Q6H PRN


   PRN Reason: Moderate Pain (4-6)

## 2018-11-06 VITALS — DIASTOLIC BLOOD PRESSURE: 77 MMHG | SYSTOLIC BLOOD PRESSURE: 174 MMHG

## 2018-11-06 VITALS — TEMPERATURE: 98.2 F

## 2018-11-06 RX ADMIN — GUAIFENESIN AND DEXTROMETHORPHAN PRN ML: 100; 10 SYRUP ORAL at 00:09

## 2018-11-06 RX ADMIN — MOMETASONE FUROATE AND FORMOTEROL FUMARATE DIHYDRATE SCH PUFF: 200; 5 AEROSOL RESPIRATORY (INHALATION) at 07:11

## 2018-11-06 RX ADMIN — Medication SCH ML: at 11:55

## 2018-11-06 RX ADMIN — ASPIRIN SCH MG: 81 TABLET ORAL at 09:35

## 2018-11-06 NOTE — PRG
DATE OF SERVICE:  11/06/2018

 

SUBJECTIVE:  Ms. Eid has no complaints.  She tells me she is anticipating going home soon.

 

OBJECTIVE:

VITAL SIGNS:  She is afebrile, heart rate is 83, respiratory rate 16, oximetry is 93 on 2 liters.  SARBJIT
NGS:  Clear.

HEART:  Regular rhythm.

ABDOMEN:  Soft.

 

She says she has a nebulizer at home.

 

IMPRESSION:

1.  Chronic obstructive pulmonary disease, stable after a mild exacerbation.

2.  Tobacco dependence.

3.  Status post admission with metabolic acidosis likely as a complication of diarrhea that was sever
e with intravascular volume depletion.

4.  History of hypertension.

5.  History of resected lung cancer.

6.  Deconditioning.

7.  Chronic pain on long-acting opiates.

 

PLAN:  Continue as per the other physicians.

 

From a pulmonary standpoint, she is stable to go home at any time.

## 2018-11-07 NOTE — DIS
DATE OF ADMISSION:  10/28/2018

 

DATE OF DISCHARGE:  11/06/2018

 

PRIMARY CARE PHYSICIAN:  Dr. Dorcas Howard.

 

DISCHARGE DIAGNOSES:

1.  Acute hypoxic respiratory failure.

2.  Sepsis.

3.  Demand ischemia.

4.  Atrial fibrillation/flutter.

5.  Acute metabolic encephalopathy.

6.  Dyskinesia coli urinary tract infection.

7.  Physical deconditioning.

 

CONSULTATIONS DURING THIS HOSPITALIZATION:  Cardiology, Dr. Romano.  Pulmonary and critical care me
Dr. Rubén logan.

 

CONDITION OF PATIENT ON THE DAY OF DISCHARGE:  Stable.  I assessed Mr. Eid on the day of discha
rge.  She denies any chest pain or shortness of breath.

 

PHYSICAL EXAMINATION:

VITAL SIGNS:  Stable.

HEART:  S1 and S2 are heard, regular.

LUNGS:  Lung examination reveals occasional expiratory wheeze.

 

DISCHARGE MEDICATIONS:  Zofran 4 mg every 6 hours as needed, Lipitor 20 mg daily, gabapentin 600 mg 4
 times a day, levothyroxine 50 mcg daily, MS Contin 60 mg 3 times a day, Zoloft 50 mg daily, Nicoderm
 14 mg patch daily, Tylenol No.3 one-two tablets every 4 hours as needed, apixaban 5 mg 2 times a day
, aspirin 81 mg daily, Tessalon 200 mg 3 times a day, cefdinir 300 mg 2 times a day for 5 more days, 
digoxin 0.125 mg daily, Cardizem- mg daily, DuoNeb 3 mL every 6 hours as needed, Medrol Dosepak
, Dulera 200/5 mcg inhaler, 1 puff 2 times a day, and Senokot S 2 tablets two times a day as needed.

 

HOSPITAL COURSE:  Ms. Eid is a pleasant 78-year-old lady who was admitted to  [facility] on 10/28/2018.  Please refer to Dr. Randle's history and physical note dated 10/28/2
018 for further details.  Patient was initially treated with BiPAP and was admitted to [facility].  She was
 seen by Cardiology and Pulmonary and Critical Care Medicine Services.  A 2D echocardiogram on 10/29/
2018 showed left ventricular ejection fraction of 60%-65%, mildly dilated left atrium, mildly enlarge
d right ventricle cavity, normal left ventricular size, moderate mitral regurgitation, and moderate-t
o-severe tricuspid regurgitation.

 

She was treated with broad spectrum antibiotics.  Influenza screen at the time of admission was negat
ilana.  Urine cultures grew Escherichia coli that was sensitive to amikacin, cefepime, gentamicin, rachell
penem, piperacillin, tazobactam, tobramycin and trimethoprim/sulfamethoxazole, intermediate sensitivi
ty to nitrofurantoin and ampicillin/sulbactam and resistant to ampicillin, cefoxitin, ceftazidime, ce
ftriaxone, ciprofloxacin, and levofloxacin.

 

She also had runs of atrial fibrillation/flutter.  She has been started on anticoagulation by Cardiol
ogy Service.  She has also been started on digoxin.

 

She had elevated troponins, therefore, underwent cardiac catheterization on 11/02/2018.  She was foun
d to have mild coronary artery disease, and the troponin elevation was most likely secondary to deman
d ischemia.

 

She continued to improve clinically but was deconditioned.  She was evaluated by therapy services.  S
he is being discharged to inpatient rehab for further management.

 

Many thanks for allowing me to participate in your patient's care.  Please feel free to contact me wi
th any questions or concerns

 

On 11/05/2018, she had hemoglobin 13.6, platelet count 499,000.  On 11/02/2018, she had sodium 136 an
d potassium 4.8.

 

DISCHARGE DESTINATION:  [facility].

 

TOTAL AMOUNT OF TIME SPENT COORDINATING THIS DISCHARGE:  32 minutes.

## 2018-11-16 ENCOUNTER — HOSPITAL ENCOUNTER (INPATIENT)
Dept: HOSPITAL 92 - ERS | Age: 78
LOS: 6 days | Discharge: NURSING, REHAB OR LONG-TERM CARE | DRG: 871 | End: 2018-11-22
Attending: INTERNAL MEDICINE | Admitting: INTERNAL MEDICINE
Payer: MEDICARE

## 2018-11-16 VITALS — BODY MASS INDEX: 32.7 KG/M2

## 2018-11-16 DIAGNOSIS — J44.9: ICD-10-CM

## 2018-11-16 DIAGNOSIS — E78.00: ICD-10-CM

## 2018-11-16 DIAGNOSIS — E03.9: ICD-10-CM

## 2018-11-16 DIAGNOSIS — E46: ICD-10-CM

## 2018-11-16 DIAGNOSIS — N17.9: ICD-10-CM

## 2018-11-16 DIAGNOSIS — M81.0: ICD-10-CM

## 2018-11-16 DIAGNOSIS — J18.9: ICD-10-CM

## 2018-11-16 DIAGNOSIS — R65.21: ICD-10-CM

## 2018-11-16 DIAGNOSIS — Z79.01: ICD-10-CM

## 2018-11-16 DIAGNOSIS — I25.10: ICD-10-CM

## 2018-11-16 DIAGNOSIS — Z79.82: ICD-10-CM

## 2018-11-16 DIAGNOSIS — G93.41: ICD-10-CM

## 2018-11-16 DIAGNOSIS — G89.29: ICD-10-CM

## 2018-11-16 DIAGNOSIS — K59.00: ICD-10-CM

## 2018-11-16 DIAGNOSIS — F17.210: ICD-10-CM

## 2018-11-16 DIAGNOSIS — I48.0: ICD-10-CM

## 2018-11-16 DIAGNOSIS — I50.33: ICD-10-CM

## 2018-11-16 DIAGNOSIS — J96.01: ICD-10-CM

## 2018-11-16 DIAGNOSIS — A41.9: Primary | ICD-10-CM

## 2018-11-16 DIAGNOSIS — I11.0: ICD-10-CM

## 2018-11-16 DIAGNOSIS — I48.92: ICD-10-CM

## 2018-11-16 LAB
ALBUMIN SERPL BCG-MCNC: 3.3 G/DL (ref 3.4–4.8)
ALP SERPL-CCNC: 83 U/L (ref 40–150)
ALT SERPL W P-5'-P-CCNC: 21 U/L (ref 8–55)
ANALYZER IN CARDIO: (no result)
ANION GAP SERPL CALC-SCNC: 10 MMOL/L (ref 10–20)
AST SERPL-CCNC: 16 U/L (ref 5–34)
BASE EXCESS STD BLDA CALC-SCNC: 1.8 MEQ/L
BILIRUB SERPL-MCNC: 0.9 MG/DL (ref 0.2–1.2)
BUN SERPL-MCNC: 35 MG/DL (ref 9.8–20.1)
CA-I BLDA-SCNC: 1.1 MMOL/L (ref 1.12–1.3)
CALCIUM SERPL-MCNC: 9.5 MG/DL (ref 7.8–10.44)
CHLORIDE SERPL-SCNC: 94 MMOL/L (ref 98–107)
CO2 SERPL-SCNC: 35 MMOL/L (ref 23–31)
CREAT CL PREDICTED SERPL C-G-VRATE: 0 ML/MIN (ref 70–130)
CRYSTAL-AUWI FLAG: 0.4 (ref 0–15)
GLOBULIN SER CALC-MCNC: 3 G/DL (ref 2.4–3.5)
GLUCOSE SERPL-MCNC: 148 MG/DL (ref 83–110)
HCO3 BLDA-SCNC: 29 MEQ/L (ref 22–28)
HCT VFR BLDA CALC: 35 % (ref 36–47)
HEV IGM SER QL: 11.6 (ref 0–7.99)
HGB BLD-MCNC: 11.8 G/DL (ref 12–16)
HGB BLDA-MCNC: 11.8 G/DL (ref 12–16)
HYALINE CASTS #/AREA URNS LPF: (no result) LPF
INR PPP: 1.4
MCH RBC QN AUTO: 30.8 PG (ref 27–31)
MCV RBC AUTO: 98.8 FL (ref 78–98)
MDIFF COMPLETE?: YES
PATHC CAST-AUWI FLAG: 3.77 (ref 0–2.49)
PCO2 BLDA: 58.6 MMHG (ref 35–45)
PH BLDA: 7.31 [PH] (ref 7.35–7.45)
PLATELET # BLD AUTO: 316 THOU/UL (ref 130–400)
PLATELET BLD QL SMEAR: (no result)
PO2 BLDA: 77.4 MMHG (ref 70–?)
POTASSIUM BLD-SCNC: 4.45 MMOL/L (ref 3.7–5.3)
POTASSIUM SERPL-SCNC: 4.7 MMOL/L (ref 3.5–5.1)
PROTHROMBIN TIME: 17.1 SEC (ref 12–14.7)
RBC # BLD AUTO: 3.83 MILL/UL (ref 4.2–5.4)
RBC UR QL AUTO: (no result) HPF (ref 0–3)
SODIUM SERPL-SCNC: 134 MMOL/L (ref 136–145)
SP GR UR STRIP: 1.02 (ref 1–1.04)
SPECIMEN DRAWN FROM PATIENT: (no result)
SPERM-AUWI FLAG: 0 (ref 0–9.9)
WBC # BLD AUTO: 17.3 THOU/UL (ref 4.8–10.8)
WBC UR QL AUTO: (no result) HPF (ref 0–3)
YEAST-AUWI FLAG: 0 (ref 0–25)

## 2018-11-16 PROCEDURE — 51701 INSERT BLADDER CATHETER: CPT

## 2018-11-16 PROCEDURE — 02HV33Z INSERTION OF INFUSION DEVICE INTO SUPERIOR VENA CAVA, PERCUTANEOUS APPROACH: ICD-10-PCS | Performed by: SURGERY

## 2018-11-16 PROCEDURE — 85610 PROTHROMBIN TIME: CPT

## 2018-11-16 PROCEDURE — 93010 ELECTROCARDIOGRAM REPORT: CPT

## 2018-11-16 PROCEDURE — 71275 CT ANGIOGRAPHY CHEST: CPT

## 2018-11-16 PROCEDURE — 90471 IMMUNIZATION ADMIN: CPT

## 2018-11-16 PROCEDURE — 36416 COLLJ CAPILLARY BLOOD SPEC: CPT

## 2018-11-16 PROCEDURE — 94640 AIRWAY INHALATION TREATMENT: CPT

## 2018-11-16 PROCEDURE — 87040 BLOOD CULTURE FOR BACTERIA: CPT

## 2018-11-16 PROCEDURE — 36556 INSERT NON-TUNNEL CV CATH: CPT

## 2018-11-16 PROCEDURE — 82533 TOTAL CORTISOL: CPT

## 2018-11-16 PROCEDURE — 87205 SMEAR GRAM STAIN: CPT

## 2018-11-16 PROCEDURE — 82805 BLOOD GASES W/O2 SATURATION: CPT

## 2018-11-16 PROCEDURE — 96365 THER/PROPH/DIAG IV INF INIT: CPT

## 2018-11-16 PROCEDURE — 87070 CULTURE OTHR SPECIMN AEROBIC: CPT

## 2018-11-16 PROCEDURE — A4353 INTERMITTENT URINARY CATH: HCPCS

## 2018-11-16 PROCEDURE — 36415 COLL VENOUS BLD VENIPUNCTURE: CPT

## 2018-11-16 PROCEDURE — 96361 HYDRATE IV INFUSION ADD-ON: CPT

## 2018-11-16 PROCEDURE — 70450 CT HEAD/BRAIN W/O DYE: CPT

## 2018-11-16 PROCEDURE — 71045 X-RAY EXAM CHEST 1 VIEW: CPT

## 2018-11-16 PROCEDURE — 90670 PCV13 VACCINE IM: CPT

## 2018-11-16 PROCEDURE — 96367 TX/PROPH/DG ADDL SEQ IV INF: CPT

## 2018-11-16 PROCEDURE — 87086 URINE CULTURE/COLONY COUNT: CPT

## 2018-11-16 PROCEDURE — 81003 URINALYSIS AUTO W/O SCOPE: CPT

## 2018-11-16 PROCEDURE — 80053 COMPREHEN METABOLIC PANEL: CPT

## 2018-11-16 PROCEDURE — S0028 INJECTION, FAMOTIDINE, 20 MG: HCPCS

## 2018-11-16 PROCEDURE — 80048 BASIC METABOLIC PNL TOTAL CA: CPT

## 2018-11-16 PROCEDURE — 85025 COMPLETE CBC W/AUTO DIFF WBC: CPT

## 2018-11-16 PROCEDURE — 96375 TX/PRO/DX INJ NEW DRUG ADDON: CPT

## 2018-11-16 PROCEDURE — 93005 ELECTROCARDIOGRAM TRACING: CPT

## 2018-11-16 PROCEDURE — G0009 ADMIN PNEUMOCOCCAL VACCINE: HCPCS

## 2018-11-16 PROCEDURE — 93798 PHYS/QHP OP CAR RHAB W/ECG: CPT

## 2018-11-16 PROCEDURE — 83605 ASSAY OF LACTIC ACID: CPT

## 2018-11-16 PROCEDURE — 83880 ASSAY OF NATRIURETIC PEPTIDE: CPT

## 2018-11-16 RX ADMIN — LEVOFLOXACIN SCH MLS: 750 INJECTION, SOLUTION INTRAVENOUS at 11:11

## 2018-11-16 RX ADMIN — MOMETASONE FUROATE AND FORMOTEROL FUMARATE DIHYDRATE SCH PUFF: 200; 5 AEROSOL RESPIRATORY (INHALATION) at 19:06

## 2018-11-16 RX ADMIN — ASPIRIN SCH MG: 81 TABLET ORAL at 10:49

## 2018-11-16 RX ADMIN — VANCOMYCIN HYDROCHLORIDE SCH MLS: 750 INJECTION, POWDER, LYOPHILIZED, FOR SOLUTION INTRAVENOUS at 21:01

## 2018-11-16 RX ADMIN — FAMOTIDINE SCH: 10 INJECTION, SOLUTION INTRAVENOUS at 21:05

## 2018-11-16 RX ADMIN — FAMOTIDINE SCH: 10 INJECTION, SOLUTION INTRAVENOUS at 10:52

## 2018-11-16 RX ADMIN — Medication SCH ML: at 10:48

## 2018-11-16 RX ADMIN — Medication SCH ML: at 21:02

## 2018-11-16 NOTE — OP
DATE OF PROCEDURE:  11/16/2018

 

PREOPERATIVE DIAGNOSES:  Acute sepsis with septic shock. 

 

POSTOPERATIVE DIAGNOSES:  Acute sepsis with septic shock.

 

PROCEDURES PERFORMED:  Placemet of triple lumen left subclavian central venous 
catheter.

 

INDICATIONS FOR PROCEDURE:  A 78-year-old woman who was seen in the emergency 
department with acute hypotension and confusion.

 

Severe sepsis with septic shock is suspected.  The patient needs IV access for 
therapeutics.  Multiple efforts to secure a dependable peripheral IV access was 
unsuccessful and I was asked to place a central venous catheter to facilitate 
therapeutic intervention.

 

DESCRIPTION OF PROCEDURE:  Informed consent obtained from the patient who was 
placed in supine position.  Left chest wall sterilely prepped and draped in 
usual fashion.  Skin below the left clavicle anesthetized with 1% lidocaine.  
Left subclavian vein was cannulated with an 18-gauge introducer needle 
returning dark venous blood.  A guidewire was passed through the needle and 
advanced to the left subclavian vein without resistance.  Needle was withdrawn 
over the guidewire.  A stab incision was made adjacent to the guidewire using 
an 11 scalpel.  The dilator was passed over the guidewire dilating subcutaneous 
tissues.  Dilator was removed and replaced with a triple-lumen central venous 
catheter which was advanced over the guidewire and placed in the left 
subclavian vein without resistance or stopping at the 18 cm ed.  Guidewire 
was removed.  Dark venous blood was aspirated from all 3 ports which were 
individually flushed with saline.  Catheter was secured to the anterior chest 
wall using 3-0 silk suture at 2 points.  Sterile dressings were applied.  The 
patient tolerated this procedure without any apparent complications.  A chest x-
ray was obtained confirming proper placement and no pneumothorax present.

 

MAKAYLA

## 2018-11-16 NOTE — CT
CT HEAD NONCONTRAST:

 

Date:  11/16/18 

 

HISTORY:  

Altered mental status. CVA. 

 

COMPARISON:  

12/24/07. 

 

FINDINGS:

There is no evidence of acute intracranial hemorrhage or infarct. Diffuse cortical atrophy and chroni
c ischemic small vessel disease have progressed since the previous exam 10 years ago. There is no mas
s effect or shift of midline structures. Mild mucosal thickening within the ethmoid air cells. 

 

IMPRESSION: 

No acute intracranial abnormalities are demonstrated. 

 

 

POS: [facility]

## 2018-11-16 NOTE — CON
DATE OF CONSULTATION:  11/16/2018

 

HISTORY:  She is a 78-year-old  female who presents with back pain, difficulty breathing, co
ugh, congestion, confusion and hypoxemia.

 

When she arrived to the ER, she was hypotensive, started on Levophed.  She had a CAT scan of her ches
t and her head and a chest x-ray was taken.

 

In my mind all are consistent with congestive heart failure, questionable pneumonia.  She is still sm
oking.  Sats were 84% on room air, her temperature was 101, blood pressure was 119/67, blood pressure
 has since declined to 80 systolic when she was started on the Levophed.

 

Her main complaint once again includes back pain.

 

PAST MEDICAL HISTORY:  Hypothyroidism, high cholesterol, hypertension, CHF, diastolic dysfunction, CO
PD, tobacco abuse.

 

PAST SURGICAL HISTORY:  She had appendix, cholecystectomy, hernia, orthopedic surgery, right hip, spi
nal surgery, thoracic and lumbar, tonsils.

 

TOBACCO:  As noted. 

 

ALCOHOL:  None.  

 

ALLERGIES:  HYDRALAZINE.

 

MEDICATIONS:  From home includes a long list including Nicoderm, Eliquis 5 twice a day, aspirin 81, d
igoxin 0.125, Cardizem-, nebulizer, Synthroid 50 mcg, Dulera, morphine, MS Contin 60 three time
s a day, Zoloft 50.  

 

On admission she was started on Maxipime 2 grams, Levaquin, Synthroid, Solu-Medrol, Dulera vancomycin
.

 

PHYSICAL EXAMINATION: 

GENERAL:  She is in the ICU.

NEUROLOGIC:  She is awake and responsive, appears to be in no distress with a Ventimask.  

VITAL SIGNS:  Sats are 99%, pulse 83, blood pressure 130/85 on the Levophed.

CHEST:  Chest reveals bilateral rhonchi and crackles.

CARDIAC:  Normal S1, S2, no gallops. 

ABDOMEN:  Soft, no masses.

 

LABORATORY:  White count 10,000, H&H 11 and 37, platelet count 360, 73 segs, 8 bands.  Electrolytes a
re normal.  Creatinine 1.9.  Bicarb is 35.

 

Urine was normal.

 

IMPRESSION:

1.  Respiratory failure.  

2.  Congestive heart failure. 

3.  Bilateral pleural effusion. 

4.  Possibly pneumonia.

5.  Renal failure.

6.  Advanced age.  

7.  Diastolic dysfunction.

 

PLAN:  At this stage, continue nebulizer treatments, steroids and antibiotics.  Diuretics.  Consult C
ardiology.

 

This is a consultation note, critical care time 45 minutes in the ICU.

## 2018-11-16 NOTE — HP
DATE OF ADMISSION:  2018

 

REASON FOR ADMISSION:  Sepsis/septic shock, right lung pneumonia, acute 
respiratory failure with hypoxia, chf exacerbatio.

 

HISTORY OF PRESENT ILLNESS:  Please note majority of this history is obtained 
by talking to ER physician, ER records and prior medical records as the patient 
is not oriented at present.  The patient was at the inpatient rehab after she 
was discharged 10 days back and was sent over for shortness of breath and 
fever.  This is per ER records.  On arrival here, the patient had saturations 
dropping down to 80% and was placed on a face mask.  Also, her blood pressure 
started to drop down to 70 systolic and was given 2 liters bolus.  She had a 
temperature of 101.6.  The patient currently is speaking in Tu[place] language and 
does not respond to in English back.

 

PAST MEDICAL/SURGICAL HISTORY:  She was discharged on 2018 to inpatient 
rehab after she was hospitalized for nearly 9 days in the hospital.  She was 
here for hypoxic respiratory failure, sepsis, metabolic encephalopathy, atrial 
fibrillation, demand ischemia and urinary tract infection.  She was 
deconditioned and was sent to rehabilitation.  The patient's last echo done on 
the  shows a good ejection fraction, but has moderate-to-severe tricuspid 
regurgitation and moderate mitral regurgitation.  History of hypertension, 
hypothyroidism, osteoporosis, chronic back pain on narcotics high dose, history 
of lung cancer with prior resection on the right side, history of lumbar fusion
, cholecystectomy, hernia repair, left hip replacement, COPD.

 

CURRENT MEDICATIONS:  The patient was discharged on Lipitor 20 mg daily, 
gabapentin 600 mg 4 times daily, levothyroxine 50 mcg daily, MS Contin 60 mg 
p.o. 3 times daily, Zoloft 50 mg daily, nicotine patch 14 mg daily, Tylenol No. 
3 p.r.n., Eliquis 5 mg twice daily likely for atrial fibrillation, aspirin 81 
mg daily, Tessalon Perles 200 mg 3 times daily, Omnicef 300 mg twice daily for 
5 days and likely has completed this course, digoxin 0.125 mg p.o. daily, 
Cardizem  mg daily, DuoNeb q.6 hourly, Medrol Dosepak, Dulera inhaler, 
Senokot-S 2 tabs twice daily as needed.

 

ALLERGIES:  No known drug allergies.

 

PERSONAL HISTORY:  The patient was smoking a pack a day up until her last 
admission on 10/28/2018.  Does not abuse drugs or alcohol.  She lives with 
alone per prior records.

 

FAMILY HISTORY:  Per prior records, mother  of lymphoma at the age of 80 
years.  Father  at the age of 87 years.

 

CODE STATUS:  I have discussed this with Ms. Branden Gonsalez, her daughter and POA 
and she wants her to be full code.

 

REVIEW OF SYSTEMS:  Cannot be obtained as the patient is not oriented at 
present.

 

PHYSICAL EXAMINATION:

VITAL SIGNS:  Blood pressure 80/60, pulse 90 per minute, respiratory rate 20 
per minute, temperature 101.2 degrees Fahrenheit, saturating 84% on room air.

NECK:  Supple.  No elevated JVD.

HEENT:  Eyes, extraocular muscles intact.  Pupils reacting to light.  Oral 
cavity, mucous membranes are dry.  No exudates or congestion.

CARDIOVASCULAR:  S1, S2 heard.  Regular rhythm.

RESPIRATORY:  Air entry 1+ bilateral.  Scattered rales plus bilateral, rhonchi 
plus bilateral.

ABDOMEN:  Soft.  Bowel sounds heard.  No tenderness, rigidity or guarding.

EXTREMITIES:  Mild peripheral edema.  No calf tenderness.

VASCULAR SYSTEM:  Peripheral pulses barely palpable in the lower extremities.

EXTREMITIES:  Upper extremities 1+ bilateral.  No ischemic ulcerations or 
gangrene.  The patient has a bruise on her right leg and right thigh area on 
the upper lateral aspect.  We will obtain an x-ray of the region once she is 
more stable hemodynamically.

CENTRAL NERVOUS SYSTEM  The patient is seen moving all extremities.  No gross 
focal signs seen, but the patient is not oriented.

PSYCHIATRIC:  Cannot be assessed due to the patient's current cognitive status.

 

LABORATORY AND X-RAY FINDINGS:  Chest x-ray done shows bilateral pneumonia, 
worse on the right side.  White count of 17, H and H 11 and 37, platelet count 
316,000 with 73% neutrophils, 8% bands.  Electrolytes are stable.  BUN 35, 
creatinine 1.1, glucose 148, albumin is 3.3.  UA done on the  shows 
moderate leukoesterase, greater than 50 WBCs with no bacteria seen.  Blood 
cultures have been obtained yesterday.  The preliminary results show no growth.
  EKG done shows atrial fibrillation at 93 beats per minute with nonspecific ST-
T wave changes.

 

CLINICAL IMPRESSION AND PLAN:  The patient will be admitted to ICU for sepsis 
with septic shock.  The patient was on steroids.  It is unclear if she is still 
on it.  In view of this, she will be given 100 mg hydrocortisone stat one dose 
now and we will continue on Solu-Medrol 40 mg IV q.6 hourly.  She will be on 
broad spectrum antibiotics including cefepime, Levaquin and vancomycin.  
DuoNeb.  She has received a total of 2 liters bolus of normal saline in the ER 
and we will give the third liter.  We will obtain CT angio chest and CT brain.  
The patient was on Eliquis for likely atrial fibrillation and it is unclear if 
she is still continuing it and unlikely to have PE given her anticoagulation.  
She will be on aspirin, Lipitor and digoxin.  We will hold the Cardizem CD.  
Levothyroxine and Zoloft as before.  We will also continue her extended release 
morphine 60 mg 3 times daily to prevent withdrawal.  Her blood gas will be 
obtained stat now.  I will consult Dr. Lazcano for Pulmonology and Critical Care.  
The patient was seen by Dr. Montana during her last admission here.  A cortisol 
level stat will be obtained as well.  I have discussed code status with the 
patient's daughter, Ms. Branden Gonsalez and she wants her mom to be full code.  She 
in fact states me that she spoke to her last night and she sounded okay.  The 
patient apparently is fluent in both Tu[place] and English per daughter.

 

MAKAYLA

## 2018-11-16 NOTE — RAD
PORTABLE CHEST ONE VIEW

11/16/18 at 4:41 a.m.

 

HISTORY: 

Dyspnea, cough. 

 

FINDINGS:  

Comparison made with exam of previous day. 

 

The heart size is normal. There is pulmonary vascular congestion with bilateral infiltrates in the lo
wer lung zones, right greater than left. No pneumothoraces or large effusions are seen. Postop change
s in the spine are redemonstrated.

 

IMPRESSION:  

Bilateral pneumonia. 

 

POS: [facility]

## 2018-11-16 NOTE — CT
CTA THORAX WITH CONTRAST: 

 

(Computed Tomographic Angiography, chest(noncoronary) with contrast material, and image postprocessin
g)

(PE protocol)

 

Date:  11/16/18

Time:  0936 hours

 

HISTORY: 

78-year-old female with cough, fever, and hemoptysis. 

 

TECHNIQUE: 

IV injection of iodinated contrast: 60 mL Isovue-370. 

Scan acquisition timing attempted to coincide with iodinated contrast bolus reaching maximal density 
in pulmonary arteries.

3D MIP reconstructions.

 

FINDINGS:

There is no evidence of pulmonary thromboembolism. There is vertebroplasty cement treating old compre
ssion fractures at multiple levels in the thoracic spine. Pedicle screws are partially visualized in 
the upper lumbar spine. There are multifocal patchy consolidations. The largest are in the bilateral 
lower lobes, with air bronchograms. There is a small right pleural effusion. There is a small bubble 
of gas at the right posterior inferior hemithoracic pleural space. There are a few smaller patchy air
 space densities, most notably in the right middle lobe. There are diffuse interstitial densities thr
oughout the rest of the lungs. There is thickening of pulmonary septa diffusely. Diffuse soft tissue 
density throughout the mediastinum suggestive of lymphadenopathy, including pretracheal region, aorto
pulmonic window, and especially subcarinal region, confluent with bilateral hilar lymphadenopathy, ri
ght greater than left. Ectasia, tortuosity, and atherosclerotic calcification of thoracic aorta. No p
neumothorax. Approximately 3 x 2 cm mass at the left adrenal gland is noted. This was present on prev
ious CT of 12/25/07, and is probably an adrenal adenoma. It has slightly increased in size since 2007
. There is a left internal jugular central venous catheter with distal tip in the SVC. There is edema
 throughout the subcutaneous soft tissues around the chest wall. 

 

IMPRESSION:

1.  Evidence for pneumonia in the bilateral lower lobes, and in the right middle lobe. 

 

2.  Nonspecific mediastinal and hilar lymphadenopathy. 

 

3.  Possible congestive heart failure with pulmonary interstitial edema. 

 

4.  No pulmonary thromboembolism. 

 

5.  Severe osteoporosis, and status post vertebroplasty of multiple old compression fractures of the 
thoracic spine. 

 

6.  Small right pleural effusion containing a small bubble of gas, raising the possibility of small r
ight empyema. Alternatively, this gas could have been introduced by a needle if there was a recent th
oracentesis.

 

7.  Anasarca. 

 

jn[] 

 

POS: [facility]

## 2018-11-16 NOTE — RAD
FRONTAL VIEW CHEST:

 

Date:  11/16/18 

 

COMPARISON:  

Earlier same date. 

 

INDICATION:

Dyspnea. Central line placement evaluation. 

 

 

FINDINGS:

There has been placement of a left subclavian venous catheter. A discrete, significant postprocedural
 pneumothorax is not seen. There has been progression of abnormal alveolar and interstitial opacity t
hroughout each lung, predominantly perihilar in distribution, superimposed upon an enlarged cardiac s
ilhouette and pulmonary vasculature. Evidence of bilateral pleural fluid, mild in volume. 

 

IMPRESSION: 

1.  Interval placement of left subclavian venous catheter without evidence of a significant postproce
dural pneumothorax. 

 

2.  Progressive pulmonary parenchymal opacification favoring cardiogenic pulmonary edema. Correlate c
linically and recommend continued imaging follow-up. 

 

 

POS: [facility]

## 2018-11-17 LAB
ALBUMIN SERPL BCG-MCNC: 3.1 G/DL (ref 3.4–4.8)
ALP SERPL-CCNC: 89 U/L (ref 40–150)
ALT SERPL W P-5'-P-CCNC: 26 U/L (ref 8–55)
ANION GAP SERPL CALC-SCNC: 10 MMOL/L (ref 10–20)
AST SERPL-CCNC: 19 U/L (ref 5–34)
BASOPHILS # BLD AUTO: 0 THOU/UL (ref 0–0.2)
BASOPHILS NFR BLD AUTO: 0.2 % (ref 0–1)
BILIRUB SERPL-MCNC: 0.4 MG/DL (ref 0.2–1.2)
BUN SERPL-MCNC: 32 MG/DL (ref 9.8–20.1)
CALCIUM SERPL-MCNC: 9 MG/DL (ref 7.8–10.44)
CHLORIDE SERPL-SCNC: 100 MMOL/L (ref 98–107)
CO2 SERPL-SCNC: 31 MMOL/L (ref 23–31)
CREAT CL PREDICTED SERPL C-G-VRATE: 57 ML/MIN (ref 70–130)
EOSINOPHIL # BLD AUTO: 0 THOU/UL (ref 0–0.7)
EOSINOPHIL NFR BLD AUTO: 0.1 % (ref 0–10)
GLOBULIN SER CALC-MCNC: 2.7 G/DL (ref 2.4–3.5)
GLUCOSE SERPL-MCNC: 167 MG/DL (ref 83–110)
HGB BLD-MCNC: 9.9 G/DL (ref 12–16)
LYMPHOCYTES # BLD: 0.4 THOU/UL (ref 1.2–3.4)
LYMPHOCYTES NFR BLD AUTO: 2.6 % (ref 21–51)
MCH RBC QN AUTO: 30.3 PG (ref 27–31)
MCV RBC AUTO: 97.2 FL (ref 78–98)
MONOCYTES # BLD AUTO: 0.5 THOU/UL (ref 0.11–0.59)
MONOCYTES NFR BLD AUTO: 3 % (ref 0–10)
NEUTROPHILS # BLD AUTO: 15.8 THOU/UL (ref 1.4–6.5)
NEUTROPHILS NFR BLD AUTO: 94.1 % (ref 42–75)
PLATELET # BLD AUTO: 281 THOU/UL (ref 130–400)
POTASSIUM SERPL-SCNC: 4.5 MMOL/L (ref 3.5–5.1)
RBC # BLD AUTO: 3.29 MILL/UL (ref 4.2–5.4)
SODIUM SERPL-SCNC: 136 MMOL/L (ref 136–145)
WBC # BLD AUTO: 16.7 THOU/UL (ref 4.8–10.8)

## 2018-11-17 RX ADMIN — FAMOTIDINE SCH: 10 INJECTION, SOLUTION INTRAVENOUS at 09:37

## 2018-11-17 RX ADMIN — FAMOTIDINE SCH: 10 INJECTION, SOLUTION INTRAVENOUS at 20:10

## 2018-11-17 RX ADMIN — ASPIRIN SCH MG: 81 TABLET ORAL at 09:17

## 2018-11-17 RX ADMIN — VANCOMYCIN HYDROCHLORIDE SCH MLS: 750 INJECTION, POWDER, LYOPHILIZED, FOR SOLUTION INTRAVENOUS at 09:18

## 2018-11-17 RX ADMIN — MOMETASONE FUROATE AND FORMOTEROL FUMARATE DIHYDRATE SCH: 200; 5 AEROSOL RESPIRATORY (INHALATION) at 09:37

## 2018-11-17 RX ADMIN — Medication SCH ML: at 20:10

## 2018-11-17 RX ADMIN — LEVOFLOXACIN SCH MLS: 750 INJECTION, SOLUTION INTRAVENOUS at 09:17

## 2018-11-17 RX ADMIN — Medication SCH ML: at 09:19

## 2018-11-17 RX ADMIN — MOMETASONE FUROATE AND FORMOTEROL FUMARATE DIHYDRATE SCH PUFF: 200; 5 AEROSOL RESPIRATORY (INHALATION) at 20:20

## 2018-11-17 NOTE — PRG
DATE OF SERVICE:  11/17/2018

 

SUBJECTIVE:  This morning, she is better, less short of breath, less cough.  X-ray still shows eviden
ce of pulmonary edema.

 

OBJECTIVE:

VITAL SIGNS:  Sats are 98 on 4 liters, pulse 71, blood pressure 124/87, respiration rate 18.

CHEST:  Bilateral crackles and wheezing.

CARDIAC:  Normal S1, S2.  No gallops.

ABDOMEN:  Soft.  No masses.

 

LABORATORY DATA:  White count 16,000, H and H 9 and 31, platelet count is normal.  Electrolytes are n
ormal.  Her BNP was 1293, elevated.

 

IMPRESSION:

1.  Congestive heart failure, probably diastolic dysfunction.

2.  Chronic obstructive pulmonary disease, tobacco abuse.

 

PLAN:  Deescalate antibiotics, p.o. medication, steroids, neb treatments, diuretics.  She was transfe
rred out of the ICU.

## 2018-11-17 NOTE — PDOC.PN
- Subjective


Encounter Start Date: 11/17/18


Encounter Start Time: 10:45


Subjective: awake, no sob


-: feels good, is eating well





- Objective


Resuscitation Status: 


 











Resuscitation Status           FULL:Full Resuscitation














MAR Reviewed: Yes


Vital Signs & Weight: 


 Vital Signs (12 hours)











  Pulse Pulse Ox


 


 11/17/18 09:17  73 


 


 11/17/18 06:55   96








 Weight











Weight                         156 lb 11.979 oz











 Most Recent Monitor Data











Heart Rate from ECG            75


 


NIBP                           120/56


 


NIBP BP-Mean                   77


 


Respiration from ECG           12


 


SpO2                           95














I&O: 


 











 11/16/18 11/17/18 11/18/18





 06:59 06:59 06:59


 


Intake Total  2053.5 


 


Output Total  2330 


 


Balance  -276.5 











Result Diagrams: 


 11/17/18 04:49





 11/17/18 04:49





Phys Exam





- Physical Examination


HEENT: PERRLA, moist MMs


Neck: no JVD, supple


Respiratory: no wheezing


rhonchi+, rales+


Cardiovascular: RRR, no significant murmur


Gastrointestinal: soft, non-tender, positive bowel sounds


Musculoskeletal: no edema, pulses present


Neurological: non-focal, moves all 4 limbs


Psychiatric: normal affect, A&O x 3





Dx/Plan


(1) Acute exacerbation of CHF (congestive heart failure)


Code(s): I50.9 - HEART FAILURE, UNSPECIFIED   Status: Acute   


Qualifiers: 


   Heart failure type: diastolic   Qualified Code(s): I50.33 - Acute on chronic 

diastolic (congestive) heart failure   





(2) Sepsis


Code(s): A41.9 - SEPSIS, UNSPECIFIED ORGANISM   Status: Suspected   


Qualifiers: 


   Sepsis type: sepsis due to unspecified organism   Qualified Code(s): A41.9 - 

Sepsis, unspecified organism   





(3) PNA (pneumonia)


Code(s): J18.9 - PNEUMONIA, UNSPECIFIED ORGANISM   Status: Suspected   


Qualifiers: 


   Pneumonia type: due to unspecified organism   Laterality: bilateral 





(4) Acute metabolic encephalopathy


Code(s): G93.41 - METABOLIC ENCEPHALOPATHY   Status: Resolved   





(5) Acute respiratory failure with hypoxia


Code(s): J96.01 - ACUTE RESPIRATORY FAILURE WITH HYPOXIA   Status: Resolved   





(6) CAD (coronary artery disease)


Code(s): I25.10 - ATHSCL HEART DISEASE OF NATIVE CORONARY ARTERY W/O ANG PCTRS 

  Status: Chronic   


Qualifiers: 


   Coronary Disease-Associated Artery/Lesion type: native artery   Native vs. 

transplanted heart: native heart   Associated angina: without angina   

Qualified Code(s): I25.10 - Atherosclerotic heart disease of native coronary 

artery without angina pectoris   





(7) COPD (chronic obstructive pulmonary disease)


Status: Chronic   





(8) Chronic pain syndrome


Code(s): G89.4 - CHRONIC PAIN SYNDROME   Status: Chronic   Comment: Resume home 

MS Contin, Gabapentin, Norco   





(9) Hypertension


Code(s): I10 - ESSENTIAL (PRIMARY) HYPERTENSION   Status: Chronic   


Qualifiers: 


   Hypertension type: essential hypertension   Qualified Code(s): I10 - 

Essential (primary) hypertension   


Comment: controlled   





- Plan


has diuresed well, is oriented well this morning


-: is on nasal canula, oral lasix bid


-: await full cultures, had normal ef, likely diastolic dysfunction


-: PT to mobilize as tolerated


-: is on levaq, prednisone, nebs.





* .


continue dig, asp and lipitor.





Review of Systems





- Medications/Allergies


Allergies/Adverse Reactions: 


 Allergies











Allergy/AdvReac Type Severity Reaction Status Date / Time


 


hydralazine Allergy   Verified 01/27/15 22:10











Medications: 


 Current Medications





Acetaminophen (Tylenol)  650 mg PO Q4H PRN


   PRN Reason: Headache/Fever/Mild Pain (1-3)


Albuterol/Ipratropium (Duoneb)  3 ml NEB QID-RT [facility]


   Last Admin: 11/17/18 09:37 Dose:  Not Given


Aspirin (Ecotrin)  81 mg PO DAILY [facility]


   Last Admin: 11/17/18 09:17 Dose:  81 mg


Atorvastatin Calcium (Lipitor)  20 mg PO HS [facility]


   Last Admin: 11/16/18 21:02 Dose:  20 mg


Benzonatate (Tessalon)  200 mg PO TID [facility]


   Last Admin: 11/17/18 09:18 Dose:  200 mg


Digoxin (Lanoxin)  0.125 mg PO QAM [facility]


   Last Admin: 11/17/18 09:17 Dose:  0.125 mg


Famotidine (Pepcid)  20 mg SLOW IVP Q12HR [facility]


   Last Admin: 11/17/18 09:37 Dose:  Not Given


Famotidine (Pepcid)  20 mg PO BID [facility]


   Last Admin: 11/17/18 09:17 Dose:  20 mg


Furosemide (Lasix)  40 mg PO 0900,1400 [facility]


Norepinephrine Bitartrate (Levophed)  250 mls @ 0 mls/hr IVPB INF [facility]; Protocol


Sodium Chloride (Normal Saline 0.9%)  1,000 mls @ 0 mls/hr IV .Q0M [facility]


Levofloxacin (Levaquin)  500 mg PO 0600 [facility]


Levothyroxine Sodium (Synthroid)  50 mcg PO 0600 [facility]


   Last Admin: 11/17/18 07:31 Dose:  50 mcg


Miscellaneous Medication (Pharmacy To Dose)  0 each IVPB PRN PRN


   PRN Reason: VANC/CEFEPIME Pharmacy to Dose


Mometasone Furoate/Formoterol Fumar (Dulera 200 Mcg/5 Mcg Inhaler)  1 puff INH 

BID-RT [facility]


   Last Admin: 11/17/18 09:37 Dose:  Not Given


Morphine Sulfate (Ms Contin)  60 mg PO TID [facility]


   Last Admin: 11/17/18 07:30 Dose:  60 mg


Ondansetron HCl (Zofran Odt)  4 mg PO Q6H PRN


   PRN Reason: Nausea/Vomiting


Ondansetron HCl (Zofran)  4 mg IVP Q6H PRN


   PRN Reason: Nausea/Vomiting


   Last Admin: 11/16/18 10:58 Dose:  4 mg


Prednisone (Prednisone)  20 mg PO QAM-WM [facility]


Senna/Docusate Sodium (Senokot S)  2 tab PO BIDPRN PRN


   PRN Reason: Constipation


Sertraline HCl (Zoloft)  50 mg PO DAILY [facility]


   Last Admin: 11/17/18 09:17 Dose:  50 mg


Sodium Chloride (Flush - Normal Saline)  10 ml IVF PRN PRN


   PRN Reason: Saline Flush


Sodium Chloride (Flush - Normal Saline)  10 ml IVF Q12HR [facility]


   Last Admin: 11/17/18 09:19 Dose:  10 ml

## 2018-11-17 NOTE — RAD
RADIOGRAPH CHEST 1 VIEW:

 

Date: 11/17/18

Time: 0510 HOURS

 

HISTORY: 

78-year-old female with respiratory distress. 

 

COMPARISON: 

11/16/18 at 0821 hours. 

 

FINDINGS:

The bilateral mixed interstitial and alveolar densities have improved. There are residual alveolar de
nsities in the right mid and right lower lung zones, and at the left base. The lung apices are relati
vely clear now. Cardiac size is within normal limits. Left subclavian central line remains. Vertebrop
lasty cement in multiple thoracic spine levels fixating old compression fractures. No pneumothorax. 

 

IMPRESSION:

Interval improvement in the bilateral infiltrates, which could represent pulmonary edema, pneumonia, 
or a combination of both. 

 

 

 

NIRAV [] 

 

POS: [facility]

## 2018-11-18 LAB
ANION GAP SERPL CALC-SCNC: 12 MMOL/L (ref 10–20)
BASOPHILS # BLD AUTO: 0 THOU/UL (ref 0–0.2)
BASOPHILS NFR BLD AUTO: 0.1 % (ref 0–1)
BUN SERPL-MCNC: 27 MG/DL (ref 9.8–20.1)
CALCIUM SERPL-MCNC: 8.8 MG/DL (ref 7.8–10.44)
CHLORIDE SERPL-SCNC: 102 MMOL/L (ref 98–107)
CO2 SERPL-SCNC: 28 MMOL/L (ref 23–31)
CREAT CL PREDICTED SERPL C-G-VRATE: 65 ML/MIN (ref 70–130)
EOSINOPHIL # BLD AUTO: 0 THOU/UL (ref 0–0.7)
EOSINOPHIL NFR BLD AUTO: 0.1 % (ref 0–10)
GLUCOSE SERPL-MCNC: 115 MG/DL (ref 83–110)
HGB BLD-MCNC: 9.7 G/DL (ref 12–16)
LYMPHOCYTES # BLD: 1 THOU/UL (ref 1.2–3.4)
LYMPHOCYTES NFR BLD AUTO: 5.3 % (ref 21–51)
MCH RBC QN AUTO: 30.3 PG (ref 27–31)
MCV RBC AUTO: 96.1 FL (ref 78–98)
MONOCYTES # BLD AUTO: 1 THOU/UL (ref 0.11–0.59)
MONOCYTES NFR BLD AUTO: 5.4 % (ref 0–10)
NEUTROPHILS # BLD AUTO: 16.1 THOU/UL (ref 1.4–6.5)
NEUTROPHILS NFR BLD AUTO: 89.1 % (ref 42–75)
PLATELET # BLD AUTO: 251 THOU/UL (ref 130–400)
POTASSIUM SERPL-SCNC: 4.1 MMOL/L (ref 3.5–5.1)
RBC # BLD AUTO: 3.2 MILL/UL (ref 4.2–5.4)
SODIUM SERPL-SCNC: 138 MMOL/L (ref 136–145)
WBC # BLD AUTO: 18.1 THOU/UL (ref 4.8–10.8)

## 2018-11-18 RX ADMIN — PNEUMOCOCCAL 13-VALENT CONJUGATE VACCINE ONE ML: 2.2; 2.2; 2.2; 2.2; 2.2; 4.4; 2.2; 2.2; 2.2; 2.2; 2.2; 2.2; 2.2 INJECTION, SUSPENSION INTRAMUSCULAR at 16:18

## 2018-11-18 RX ADMIN — Medication SCH ML: at 21:17

## 2018-11-18 RX ADMIN — ASPIRIN SCH MG: 81 TABLET ORAL at 09:34

## 2018-11-18 RX ADMIN — DOCUSATE SODIUM 50 MG AND SENNOSIDES 8.6 MG PRN TAB: 8.6; 5 TABLET, FILM COATED ORAL at 16:17

## 2018-11-18 RX ADMIN — MOMETASONE FUROATE AND FORMOTEROL FUMARATE DIHYDRATE SCH PUFF: 200; 5 AEROSOL RESPIRATORY (INHALATION) at 06:09

## 2018-11-18 RX ADMIN — FAMOTIDINE SCH: 10 INJECTION, SOLUTION INTRAVENOUS at 09:36

## 2018-11-18 RX ADMIN — Medication SCH ML: at 09:41

## 2018-11-18 RX ADMIN — MOMETASONE FUROATE AND FORMOTEROL FUMARATE DIHYDRATE SCH PUFF: 200; 5 AEROSOL RESPIRATORY (INHALATION) at 20:07

## 2018-11-18 NOTE — PDOC.PN
- Subjective


Encounter Start Date: 11/18/18


Encounter Start Time: 09:45


Subjective: has sob, felt worse this morning


-: is trying to eat her breakfast





- Objective


Resuscitation Status: 


 











Resuscitation Status           FULL:Full Resuscitation














MAR Reviewed: Yes


Vital Signs & Weight: 


 Vital Signs (12 hours)











  Temp Pulse Resp BP Pulse Ox


 


 11/18/18 10:08   71  16   94 L


 


 11/18/18 09:33   86   


 


 11/18/18 07:33  97.9 F  85  16  148/69 H  91 L


 


 11/18/18 06:08   72  14   96


 


 11/17/18 23:20  98.8 F  82  20  104/52 L  91 L








 Weight











Admit Weight                   156 lb 11.979 oz


 


Weight                         156 lb 11.979 oz











 Most Recent Monitor Data











Heart Rate from ECG            75


 


NIBP                           120/56


 


NIBP BP-Mean                   77


 


Respiration from ECG           12


 


SpO2                           95














I&O: 


 











 11/17/18 11/18/18 11/19/18





 06:59 06:59 06:59


 


Intake Total 2053.5  


 


Output Total 2330 1150 


 


Balance -276.5 -1150 











Result Diagrams: 


 11/17/18 04:49





 11/17/18 04:49





Phys Exam





- Physical Examination


HEENT: PERRLA, moist MMs


Neck: no JVD, supple


Respiratory: no wheezing


rales++


Cardiovascular: RRR, no significant murmur


Gastrointestinal: soft, non-tender, positive bowel sounds


Musculoskeletal: no edema, pulses present


Neurological: non-focal, moves all 4 limbs


Psychiatric: normal affect, A&O x 3





Dx/Plan


(1) Acute exacerbation of CHF (congestive heart failure)


Code(s): I50.9 - HEART FAILURE, UNSPECIFIED   Status: Acute   


Qualifiers: 


   Heart failure type: diastolic   Qualified Code(s): I50.33 - Acute on chronic 

diastolic (congestive) heart failure   





(2) Sepsis


Code(s): A41.9 - SEPSIS, UNSPECIFIED ORGANISM   Status: Suspected   


Qualifiers: 


   Sepsis type: sepsis due to unspecified organism   Qualified Code(s): A41.9 - 

Sepsis, unspecified organism   





(3) PNA (pneumonia)


Code(s): J18.9 - PNEUMONIA, UNSPECIFIED ORGANISM   Status: Suspected   


Qualifiers: 


   Pneumonia type: due to unspecified organism   Laterality: bilateral 





(4) Acute metabolic encephalopathy


Code(s): G93.41 - METABOLIC ENCEPHALOPATHY   Status: Resolved   





(5) Acute respiratory failure with hypoxia


Code(s): J96.01 - ACUTE RESPIRATORY FAILURE WITH HYPOXIA   Status: Resolved   





(6) CAD (coronary artery disease)


Code(s): I25.10 - ATHSCL HEART DISEASE OF NATIVE CORONARY ARTERY W/O ANG PCTRS 

  Status: Chronic   


Qualifiers: 


   Coronary Disease-Associated Artery/Lesion type: native artery   Native vs. 

transplanted heart: native heart   Associated angina: without angina   

Qualified Code(s): I25.10 - Atherosclerotic heart disease of native coronary 

artery without angina pectoris   





(7) COPD (chronic obstructive pulmonary disease)


Status: Chronic   





(8) Chronic pain syndrome


Code(s): G89.4 - CHRONIC PAIN SYNDROME   Status: Chronic   Comment: Resume home 

MS Contin, Gabapentin, Norco   





(9) Hypertension


Code(s): I10 - ESSENTIAL (PRIMARY) HYPERTENSION   Status: Chronic   


Qualifiers: 


   Hypertension type: essential hypertension   Qualified Code(s): I10 - 

Essential (primary) hypertension   


Comment: controlled   





- Plan


change lasix to iv for 3 more doses then po


-: is on levaquin, nebs prn


-: to amb as tolerated with PT


-: continue digoxin, lipitor and aspirin as before


-: likely dc plan in am to rehab if she is clinically better





* .








Review of Systems





- Medications/Allergies


Allergies/Adverse Reactions: 


 Allergies











Allergy/AdvReac Type Severity Reaction Status Date / Time


 


hydralazine Allergy   Verified 01/27/15 22:10











Medications: 


 Current Medications





Acetaminophen (Tylenol)  650 mg PO Q4H PRN


   PRN Reason: Headache/Fever/Mild Pain (1-3)


Albuterol/Ipratropium (Duoneb)  3 ml NEB QID-RT [facility]


   Last Admin: 11/18/18 10:08 Dose:  3 ml


Aspirin (Ecotrin)  81 mg PO DAILY [facility]


   Last Admin: 11/18/18 09:34 Dose:  81 mg


Atorvastatin Calcium (Lipitor)  20 mg PO HS [facility]


   Last Admin: 11/17/18 20:10 Dose:  20 mg


Benzonatate (Tessalon)  200 mg PO TID [facility]


   Last Admin: 11/18/18 09:29 Dose:  200 mg


Digoxin (Lanoxin)  0.125 mg PO QAM [facility]


   Last Admin: 11/18/18 09:33 Dose:  0.125 mg


Famotidine (Pepcid)  20 mg PO BID [facility]


   Last Admin: 11/18/18 09:36 Dose:  20 mg


Furosemide (Lasix)  40 mg SLOW IVP 0600,1400 [facility]


Norepinephrine Bitartrate (Levophed)  250 mls @ 0 mls/hr IVPB INF [facility]; Protocol


Sodium Chloride (Normal Saline 0.9%)  1,000 mls @ 0 mls/hr IV .Q0M [facility]


Levofloxacin (Levaquin)  500 mg PO 0600 [facility]


   Last Admin: 11/18/18 05:58 Dose:  500 mg


Levothyroxine Sodium (Synthroid)  50 mcg PO 0600 [facility]


   Last Admin: 11/18/18 05:58 Dose:  50 mcg


Mometasone Furoate/Formoterol Fumar (Dulera 200 Mcg/5 Mcg Inhaler)  1 puff INH 

BID-RT [facility]


   Last Admin: 11/18/18 06:09 Dose:  1 puff


Morphine Sulfate (Ms Contin)  60 mg PO TID [facility]


   Last Admin: 11/18/18 09:34 Dose:  60 mg


Ondansetron HCl (Zofran Odt)  4 mg PO Q6H PRN


   PRN Reason: Nausea/Vomiting


   Last Admin: 11/17/18 20:13 Dose:  4 mg


Ondansetron HCl (Zofran)  4 mg IVP Q6H PRN


   PRN Reason: Nausea/Vomiting


   Last Admin: 11/16/18 10:58 Dose:  4 mg


Prednisone (Prednisone)  20 mg PO QAM-WM [facility]


   Last Admin: 11/18/18 09:35 Dose:  20 mg


Senna/Docusate Sodium (Senokot S)  2 tab PO BIDPRN PRN


   PRN Reason: Constipation


Sertraline HCl (Zoloft)  50 mg PO DAILY [facility]


   Last Admin: 11/18/18 09:36 Dose:  50 mg


Sodium Chloride (Flush - Normal Saline)  10 ml IVF PRN PRN


   PRN Reason: Saline Flush


Sodium Chloride (Flush - Normal Saline)  10 ml IVF Q12HR [facility]


   Last Admin: 11/18/18 09:41 Dose:  10 ml

## 2018-11-18 NOTE — PRG
DATE OF SERVICE:  11/18/2018

 

SUBJECTIVE:  This morning, she is better.  She is less short of breath, still confused.

 

OBJECTIVE:

VITAL SIGNS:  Sats 90 on room air, respiration rate 18, temperature 98, blood pressure 143/67.

CHEST:  Chest reveals decreased breath sounds, no wheezing.

CARDIAC:  Normal S1, S2.

ABDOMEN:  Soft, no gallops mass.

 

LABORATORY:  White count 18,000, H and H 9 and 30, platelet count is 251.

 

IMPRESSION:  Chronic obstructive pulmonary disease, tobacco abuse, severe deconditioning.

 

PLAN:  P.o. antibiotics, p.o. steroids.

 

Home soon.

## 2018-11-19 LAB
ANION GAP SERPL CALC-SCNC: 10 MMOL/L (ref 10–20)
BUN SERPL-MCNC: 26 MG/DL (ref 9.8–20.1)
CALCIUM SERPL-MCNC: 9 MG/DL (ref 7.8–10.44)
CHLORIDE SERPL-SCNC: 97 MMOL/L (ref 98–107)
CO2 SERPL-SCNC: 34 MMOL/L (ref 23–31)
CREAT CL PREDICTED SERPL C-G-VRATE: 64 ML/MIN (ref 70–130)
GLUCOSE SERPL-MCNC: 96 MG/DL (ref 83–110)
POTASSIUM SERPL-SCNC: 3.4 MMOL/L (ref 3.5–5.1)
SODIUM SERPL-SCNC: 138 MMOL/L (ref 136–145)

## 2018-11-19 RX ADMIN — MOMETASONE FUROATE AND FORMOTEROL FUMARATE DIHYDRATE SCH PUFF: 200; 5 AEROSOL RESPIRATORY (INHALATION) at 06:32

## 2018-11-19 RX ADMIN — ASPIRIN SCH MG: 81 TABLET ORAL at 09:20

## 2018-11-19 RX ADMIN — PNEUMOCOCCAL 13-VALENT CONJUGATE VACCINE ONE: 2.2; 2.2; 2.2; 2.2; 2.2; 4.4; 2.2; 2.2; 2.2; 2.2; 2.2; 2.2; 2.2 INJECTION, SUSPENSION INTRAMUSCULAR at 10:48

## 2018-11-19 RX ADMIN — Medication SCH: at 21:14

## 2018-11-19 RX ADMIN — MOMETASONE FUROATE AND FORMOTEROL FUMARATE DIHYDRATE SCH PUFF: 200; 5 AEROSOL RESPIRATORY (INHALATION) at 18:45

## 2018-11-19 RX ADMIN — Medication SCH ML: at 09:24

## 2018-11-19 RX ADMIN — DOCUSATE SODIUM 50 MG AND SENNOSIDES 8.6 MG PRN TAB: 8.6; 5 TABLET, FILM COATED ORAL at 16:06

## 2018-11-19 NOTE — PDOC.PN
- Subjective


Encounter Start Date: 11/19/18


Encounter Start Time: 08:30


Subjective: does not feel good


-: no sob or chest pain


-: has chills but no fever





- Objective


Resuscitation Status: 


 











Resuscitation Status           FULL:Full Resuscitation














MAR Reviewed: Yes


Vital Signs & Weight: 


 Vital Signs (12 hours)











  Temp Pulse Resp BP Pulse Ox


 


 11/19/18 11:40  98.7 F  98  16  174/98 H  93 L


 


 11/19/18 09:19   79   


 


 11/19/18 07:41  98.6 F  79  18  187/82 H  93 L


 


 11/19/18 06:29   76  18   90 L


 


 11/19/18 05:15     169/75 H 


 


 11/19/18 04:00  98.3 F  79  20  178/84 H  95


 


 11/19/18 00:00  99.5 F  87  16  141/67 H  94 L








 Weight











Admit Weight                   156 lb 11.979 oz


 


Weight                         156 lb 11.979 oz











 Most Recent Monitor Data











Heart Rate from ECG            75


 


NIBP                           120/56


 


NIBP BP-Mean                   77


 


Respiration from ECG           12


 


SpO2                           95














I&O: 


 











 11/18/18 11/19/18 11/20/18





 06:59 06:59 06:59


 


Intake Total  340 


 


Output Total 1150 300 


 


Balance -1150 40 











Result Diagrams: 


 11/18/18 07:39





 11/19/18 05:42


Additional Labs: 


 Accuchecks











  11/19/18





  09:10


 


POC Glucose  88














Phys Exam





- Physical Examination


HEENT: PERRLA, moist MMs


Neck: no JVD, supple


Respiratory: no wheezing, no rales


Cardiovascular: RRR, no significant murmur


Gastrointestinal: soft, non-tender, positive bowel sounds


Musculoskeletal: no edema, pulses present


Neurological: non-focal, moves all 4 limbs





Dx/Plan


(1) Atrial fibrillation with RVR


Code(s): I48.91 - UNSPECIFIED ATRIAL FIBRILLATION   Status: Acute   Comment: 

cardizem drip   





(2) Acute exacerbation of CHF (congestive heart failure)


Code(s): I50.9 - HEART FAILURE, UNSPECIFIED   Status: Acute   


Qualifiers: 


   Heart failure type: diastolic   Qualified Code(s): I50.33 - Acute on chronic 

diastolic (congestive) heart failure   





(3) Sepsis


Code(s): A41.9 - SEPSIS, UNSPECIFIED ORGANISM   Status: Suspected   


Qualifiers: 


   Sepsis type: sepsis due to unspecified organism   Qualified Code(s): A41.9 - 

Sepsis, unspecified organism   





(4) PNA (pneumonia)


Code(s): J18.9 - PNEUMONIA, UNSPECIFIED ORGANISM   Status: Suspected   


Qualifiers: 


   Pneumonia type: due to unspecified organism   Laterality: bilateral 





(5) Acute metabolic encephalopathy


Code(s): G93.41 - METABOLIC ENCEPHALOPATHY   Status: Resolved   





(6) Acute respiratory failure with hypoxia


Code(s): J96.01 - ACUTE RESPIRATORY FAILURE WITH HYPOXIA   Status: Resolved   





(7) CAD (coronary artery disease)


Code(s): I25.10 - ATHSCL HEART DISEASE OF NATIVE CORONARY ARTERY W/O ANG PCTRS 

  Status: Chronic   


Qualifiers: 


   Coronary Disease-Associated Artery/Lesion type: native artery   Native vs. 

transplanted heart: native heart   Associated angina: without angina   

Qualified Code(s): I25.10 - Atherosclerotic heart disease of native coronary 

artery without angina pectoris   





(8) COPD (chronic obstructive pulmonary disease)


Status: Chronic   





(9) Chronic pain syndrome


Code(s): G89.4 - CHRONIC PAIN SYNDROME   Status: Chronic   Comment: Resume home 

MS Contin, Gabapentin, Norco   





(10) Hypertension


Code(s): I10 - ESSENTIAL (PRIMARY) HYPERTENSION   Status: Chronic   


Qualifiers: 


   Hypertension type: essential hypertension   Qualified Code(s): I10 - 

Essential (primary) hypertension   


Comment: controlled   





- Plan


is on cardizem drip, watch for BP


-: change lasix to po


-: on digoxin, asp, lipitor


-: levaquin, steroids, prn nebs


-: PT to mobilize when rvr settles down





* .








Review of Systems





- Medications/Allergies


Allergies/Adverse Reactions: 


 Allergies











Allergy/AdvReac Type Severity Reaction Status Date / Time


 


hydralazine Allergy   Verified 01/27/15 22:10











Medications: 


 Current Medications





Acetaminophen (Tylenol)  650 mg PO Q4H PRN


   PRN Reason: Headache/Fever/Mild Pain (1-3)


Albuterol/Ipratropium (Duoneb)  3 ml NEB QID-RT [facility]


   Last Admin: 11/19/18 09:57 Dose:  Not Given


Aspirin (Ecotrin)  81 mg PO DAILY [facility]


   Last Admin: 11/19/18 09:20 Dose:  81 mg


Atorvastatin Calcium (Lipitor)  20 mg PO HS [facility]


   Last Admin: 11/18/18 21:18 Dose:  20 mg


Benzonatate (Tessalon)  200 mg PO TID [facility]


   Last Admin: 11/19/18 09:23 Dose:  200 mg


Digoxin (Lanoxin)  0.125 mg PO QAM [facility]


   Last Admin: 11/19/18 09:19 Dose:  0.125 mg


Famotidine (Pepcid)  20 mg PO BID [facility]


   Last Admin: 11/19/18 09:19 Dose:  20 mg


Furosemide (Lasix)  40 mg SLOW IVP 0600,1400 [facility]


   Last Admin: 11/19/18 05:54 Dose:  40 mg


Diltiazem HCl 125 mg/Miscellaneous Medication 1 each/ Sodium Chloride  125 mls 

@ 5 mls/hr IVPB INF [facility]; Protocol


   Last Admin: 11/19/18 10:22 Dose:  125 mls


Levofloxacin (Levaquin)  500 mg PO 0600 [facility]


   Last Admin: 11/19/18 05:54 Dose:  500 mg


Levothyroxine Sodium (Synthroid)  50 mcg PO 0600 [facility]


   Last Admin: 11/19/18 05:54 Dose:  50 mcg


Mometasone Furoate/Formoterol Fumar (Dulera 200 Mcg/5 Mcg Inhaler)  1 puff INH 

BID-RT [facility]


   Last Admin: 11/19/18 06:32 Dose:  1 puff


Morphine Sulfate (Ms Contin)  60 mg PO TID [facility]


   Last Admin: 11/19/18 10:29 Dose:  60 mg


Ondansetron HCl (Zofran Odt)  4 mg PO Q6H PRN


   PRN Reason: Nausea/Vomiting


   Last Admin: 11/19/18 10:31 Dose:  4 mg


Ondansetron HCl (Zofran)  4 mg IVP Q6H PRN


   PRN Reason: Nausea/Vomiting


   Last Admin: 11/16/18 10:58 Dose:  4 mg


Prednisone (Prednisone)  20 mg PO QAM-WM [facility]


   Last Admin: 11/19/18 09:19 Dose:  20 mg


Senna/Docusate Sodium (Senokot S)  2 tab PO BIDPRN PRN


   PRN Reason: Constipation


   Last Admin: 11/18/18 16:17 Dose:  2 tab


Sertraline HCl (Zoloft)  50 mg PO DAILY [facility]


   Last Admin: 11/19/18 09:21 Dose:  50 mg


Sodium Chloride (Flush - Normal Saline)  10 ml IVF PRN PRN


   PRN Reason: Saline Flush


Sodium Chloride (Flush - Normal Saline)  10 ml IVF Q12HR [facility]


   Last Admin: 11/19/18 09:24 Dose:  10 ml

## 2018-11-19 NOTE — CON
DATE OF CONSULTATION:  11/19/2018

 

REASON FOR CONSULTATION:  Atrial flutter.

 

PRIMARY CARDIOLOGIST:  Dr. Amadou Romano.

 

HISTORY OF PRESENT ILLNESS:  Ms. Eid is a delightful 78-year-old woman.  She recently was in [facility] with tachycardia, atrial fibrillation and some atrial flutter.  She was sent to [facility] before she had to come back to the hospital due to shortness of breath and rapid heart rate.

 

The patient has recurrent atrial flutter, we have been reconsulted.  

 

The patient recently had cardiac catheterization showing only mild atherosclerotic heart disease.

 

MEDICATIONS:

1.  Intravenous diltiazem.

2.  Aspirin.

3.  Atorvastatin.

4.  Digoxin 0.125 mg a day.

5.  Prednisone.

 

ALLERGIES:  HYDRALAZINE.

 

REVIEW OF SYSTEMS:  

CONSTITUTIONAL:  Feels weak and fatigued.  

VISION:  No changes.  

HEARING:  No changes.  

PULMONARY:  No cough or wheezing.

GASTROINTESTINAL:  No nausea, vomiting, diarrhea.

SKIN:  No rashes.

NEUROLOGIC:  No unilateral weakness or numbness.

PSYCHIATRIC:  No unusual depression or anxiety.

 

PAST MEDICAL HISTORY:  Positive for atrial fibrillation, atrial flutter and COPD.

 

PAST SURGICAL HISTORY:  Carcinoid removal from right medial lung, cholecystectomy, hernia repair, hip
 replacement.

 

SOCIAL HISTORY:  She has a long history of smoking up until this recent admission, smoked a pack a da
y.

 

PHYSICAL EXAMINATION:

GENERAL:  This is a pleasant elderly woman, states that she just does not feel well.

VITAL SIGNS:  Blood pressure 174/98, pulse 98, it is currently regular and she is in sinus rhythm.

NECK:  Neck veins are normal.  Carotid normal upstrokes, no bruits.

LUNGS:  Clear.

CARDIAC:  Currently, normal S1, normal S2.  There is no murmur, rub or gallop.

ABDOMEN:  Soft, nontender.

EXTREMITIES:  Warm, dry, no clubbing, cyanosis or edema.

 

PERTINENT LABORATORY AND X-RAY FINDINGS:  Hemoglobin is 9.7, hematocrit 30.7, potassium 3.4, BUN is 2
6, creatinine 0.82.  BNP 1293.

 

Recent cardiac catheterization showed only mild nonobstructive atherosclerotic heart disease.  Echoca
rdiogram; ejection fraction 60-65%.  The EKG does show atrial flutter with a ventricular response of 
140.  Now, she is in sinus rhythm.

 

ASSESSMENT:

1.  Paroxysmal atrial flutter.

2.  Also, paroxysmal atrial fibrillation by report.  

 

PLAN:  

1.  Currently on intravenous diltiazem.

2.  She is on digoxin.

3.  She is on furosemide, transitioning to intravenous.  She has diastolic heart failure.  

4.  Consideration for Electrophysiologic consultation, at some point would likely benefit from atrial
 flutter ablation.  For now, we will continue the intravenous Cardizem.

## 2018-11-20 LAB
ANION GAP SERPL CALC-SCNC: 14 MMOL/L (ref 10–20)
BASOPHILS # BLD AUTO: 0 THOU/UL (ref 0–0.2)
BASOPHILS NFR BLD AUTO: 0 % (ref 0–1)
BUN SERPL-MCNC: 21 MG/DL (ref 9.8–20.1)
CALCIUM SERPL-MCNC: 9.6 MG/DL (ref 7.8–10.44)
CHLORIDE SERPL-SCNC: 93 MMOL/L (ref 98–107)
CO2 SERPL-SCNC: 34 MMOL/L (ref 23–31)
CREAT CL PREDICTED SERPL C-G-VRATE: 61 ML/MIN (ref 70–130)
EOSINOPHIL # BLD AUTO: 0.1 THOU/UL (ref 0–0.7)
EOSINOPHIL NFR BLD AUTO: 0.8 % (ref 0–10)
GLUCOSE SERPL-MCNC: 136 MG/DL (ref 83–110)
HGB BLD-MCNC: 12.2 G/DL (ref 12–16)
LYMPHOCYTES # BLD: 0.8 THOU/UL (ref 1.2–3.4)
LYMPHOCYTES NFR BLD AUTO: 5.4 % (ref 21–51)
MCH RBC QN AUTO: 30 PG (ref 27–31)
MCV RBC AUTO: 94.9 FL (ref 78–98)
MONOCYTES # BLD AUTO: 0.6 THOU/UL (ref 0.11–0.59)
MONOCYTES NFR BLD AUTO: 4.1 % (ref 0–10)
NEUTROPHILS # BLD AUTO: 12.4 THOU/UL (ref 1.4–6.5)
NEUTROPHILS NFR BLD AUTO: 89.7 % (ref 42–75)
PLATELET # BLD AUTO: 318 THOU/UL (ref 130–400)
POTASSIUM SERPL-SCNC: 3.7 MMOL/L (ref 3.5–5.1)
RBC # BLD AUTO: 4.07 MILL/UL (ref 4.2–5.4)
SODIUM SERPL-SCNC: 137 MMOL/L (ref 136–145)
WBC # BLD AUTO: 13.8 THOU/UL (ref 4.8–10.8)

## 2018-11-20 RX ADMIN — ASPIRIN SCH MG: 81 TABLET ORAL at 08:26

## 2018-11-20 RX ADMIN — MOMETASONE FUROATE AND FORMOTEROL FUMARATE DIHYDRATE SCH PUFF: 200; 5 AEROSOL RESPIRATORY (INHALATION) at 06:11

## 2018-11-20 RX ADMIN — Medication SCH ML: at 08:26

## 2018-11-20 RX ADMIN — MOMETASONE FUROATE AND FORMOTEROL FUMARATE DIHYDRATE SCH: 200; 5 AEROSOL RESPIRATORY (INHALATION) at 18:33

## 2018-11-20 NOTE — PDOC.PN
- Subjective


Encounter Start Date: 11/20/18


Encounter Start Time: 09:45


Subjective: no sob, feels better


-: ate her breakfast


-: is wanting to walk with PT





- Objective


Resuscitation Status: 


 











Resuscitation Status           FULL:Full Resuscitation














MAR Reviewed: Yes


Vital Signs & Weight: 


 Vital Signs (12 hours)











  Temp Pulse Resp BP BP Pulse Ox


 


 11/20/18 09:38   85  14    92 L


 


 11/20/18 08:26   59 L   134/64  


 


 11/20/18 08:25   62    


 


 11/20/18 07:45  98.3 F  62  16   158/71 H  89 L


 


 11/20/18 06:11   85  16    92 L


 


 11/20/18 06:09   81  16    92 L


 


 11/20/18 04:00  98.6 F  60  19   140/65  91 L


 


 11/20/18 00:00  98.7 F  83  19   157/73 H  91 L








 Weight











Admit Weight                   156 lb 11.979 oz


 


Weight                         156 lb 11.979 oz











 Most Recent Monitor Data











Heart Rate from ECG            75


 


NIBP                           120/56


 


NIBP BP-Mean                   77


 


Respiration from ECG           12


 


SpO2                           95














I&O: 


 











 11/19/18 11/20/18 11/21/18





 06:59 06:59 06:59


 


Intake Total 340  


 


Output Total 300 800 


 


Balance 40 -800 











Result Diagrams: 


 11/18/18 07:39





 11/19/18 05:42





Phys Exam





- Physical Examination


HEENT: PERRLA, sclera anicteric


Neck: no JVD, supple


Respiratory: no wheezing


rales+


Cardiovascular: no significant murmur, irregular


Gastrointestinal: soft, non-tender, positive bowel sounds


Musculoskeletal: no edema, pulses present


Neurological: non-focal, moves all 4 limbs


Psychiatric: A&O x 3





Dx/Plan


(1) Atrial fibrillation with RVR


Code(s): I48.91 - UNSPECIFIED ATRIAL FIBRILLATION   Status: Acute   Comment: 

rate controlled   





(2) Acute exacerbation of CHF (congestive heart failure)


Code(s): I50.9 - HEART FAILURE, UNSPECIFIED   Status: Acute   


Qualifiers: 


   Heart failure type: diastolic   Qualified Code(s): I50.33 - Acute on chronic 

diastolic (congestive) heart failure   





(3) Sepsis


Code(s): A41.9 - SEPSIS, UNSPECIFIED ORGANISM   Status: Suspected   


Qualifiers: 


   Sepsis type: sepsis due to unspecified organism   Qualified Code(s): A41.9 - 

Sepsis, unspecified organism   





(4) PNA (pneumonia)


Code(s): J18.9 - PNEUMONIA, UNSPECIFIED ORGANISM   Status: Suspected   


Qualifiers: 


   Pneumonia type: due to unspecified organism   Laterality: bilateral 





(5) Acute metabolic encephalopathy


Code(s): G93.41 - METABOLIC ENCEPHALOPATHY   Status: Resolved   





(6) Acute respiratory failure with hypoxia


Code(s): J96.01 - ACUTE RESPIRATORY FAILURE WITH HYPOXIA   Status: Resolved   





(7) CAD (coronary artery disease)


Code(s): I25.10 - ATHSCL HEART DISEASE OF NATIVE CORONARY ARTERY W/O ANG PCTRS 

  Status: Chronic   


Qualifiers: 


   Coronary Disease-Associated Artery/Lesion type: native artery   Native vs. 

transplanted heart: native heart   Associated angina: without angina   

Qualified Code(s): I25.10 - Atherosclerotic heart disease of native coronary 

artery without angina pectoris   





(8) COPD (chronic obstructive pulmonary disease)


Status: Chronic   





(9) Chronic pain syndrome


Code(s): G89.4 - CHRONIC PAIN SYNDROME   Status: Chronic   Comment: Resume home 

MS Contin, Gabapentin, Norco   





(10) Hypertension


Code(s): I10 - ESSENTIAL (PRIMARY) HYPERTENSION   Status: Chronic   


Qualifiers: 


   Hypertension type: essential hypertension   Qualified Code(s): I10 - 

Essential (primary) hypertension   


Comment: controlled   





- Plan


afib/flutter is rate controlled, dc cardizem drip


-: is on oral cardizem, dig, aspirin, eliquis, lasix


-: levaquin, nebs, prednisone


-: PT to mobilize from today


-: dc planning to home with HH/PT?





* .








Review of Systems





- Medications/Allergies


Allergies/Adverse Reactions: 


 Allergies











Allergy/AdvReac Type Severity Reaction Status Date / Time


 


hydralazine Allergy   Verified 01/27/15 22:10











Medications: 


 Current Medications





Acetaminophen (Tylenol)  650 mg PO Q4H PRN


   PRN Reason: Headache/Fever/Mild Pain (1-3)


   Last Admin: 11/20/18 02:13 Dose:  650 mg


Albuterol/Ipratropium (Duoneb)  3 ml NEB QID-RT [facility]


   Last Admin: 11/20/18 09:38 Dose:  3 ml


Aspirin (Ecotrin)  81 mg PO DAILY [facility]


   Last Admin: 11/20/18 08:26 Dose:  81 mg


Atorvastatin Calcium (Lipitor)  20 mg PO HS [facility]


   Last Admin: 11/19/18 21:11 Dose:  20 mg


Benzonatate (Tessalon)  200 mg PO TID [facility]


   Last Admin: 11/20/18 08:25 Dose:  200 mg


Digoxin (Lanoxin)  0.125 mg PO QAM [facility]


   Last Admin: 11/20/18 08:25 Dose:  0.125 mg


Diltiazem HCl (Cardizem Cd)  120 mg PO BID [facility]


   Last Admin: 11/20/18 08:26 Dose:  120 mg


Famotidine (Pepcid)  20 mg PO BID [facility]


   Last Admin: 11/20/18 08:26 Dose:  20 mg


Furosemide (Lasix)  20 mg PO 0900,1400 [facility]


   Last Admin: 11/20/18 08:26 Dose:  20 mg


Levofloxacin (Levaquin)  500 mg PO 0600 [facility]


   Last Admin: 11/20/18 07:07 Dose:  500 mg


Levothyroxine Sodium (Synthroid)  50 mcg PO 0600 [facility]


   Last Admin: 11/20/18 07:07 Dose:  50 mcg


Mometasone Furoate/Formoterol Fumar (Dulera 200 Mcg/5 Mcg Inhaler)  1 puff INH 

BID-RT [facility]


   Last Admin: 11/20/18 06:11 Dose:  1 puff


Morphine Sulfate (Ms Contin)  60 mg PO TID [facility]


   Last Admin: 11/20/18 08:25 Dose:  60 mg


Ondansetron HCl (Zofran Odt)  4 mg PO Q6H PRN


   PRN Reason: Nausea/Vomiting


   Last Admin: 11/19/18 10:31 Dose:  4 mg


Ondansetron HCl (Zofran)  4 mg IVP Q6H PRN


   PRN Reason: Nausea/Vomiting


   Last Admin: 11/16/18 10:58 Dose:  4 mg


Prednisone (Prednisone)  20 mg PO QAM-WM [facility]


   Last Admin: 11/20/18 08:26 Dose:  20 mg


Senna/Docusate Sodium (Senokot S)  2 tab PO BIDPRN PRN


   PRN Reason: Constipation


   Last Admin: 11/19/18 16:06 Dose:  2 tab


Sertraline HCl (Zoloft)  50 mg PO DAILY [facility]


   Last Admin: 11/20/18 08:27 Dose:  50 mg


Sodium Chloride (Flush - Normal Saline)  10 ml IVF PRN PRN


   PRN Reason: Saline Flush


Sodium Chloride (Flush - Normal Saline)  10 ml IVF Q12HR [facility]


   Last Admin: 11/20/18 08:26 Dose:  10 ml

## 2018-11-20 NOTE — PRG
DATE OF SERVICE:  11/20/2018

 

SUBJECTIVE:  Ms. Eid says she just feels "so-so."  No chest pain or pressure.

 

PHYSICAL EXAMINATION:

VITAL SIGNS:  Blood pressure 157/74, pulse 67 and regular.

LUNGS:  Clear.

CARDIAC:  Normal S1, normal S2.

ABDOMEN:  Soft, nontender.

EXTREMITIES:  There is no significant edema.

 

ASSESSMENT:

1.  Paroxysmal atrial fibrillation and atrial flutter, now in sinus rhythm.

2.  Chronic obstructive pulmonary disease.

 

PLAN:  

1.  Continue current medical regimen.  

2.  At some point, will likely need atrial flutter ablation once the medical problems improved.

## 2018-11-20 NOTE — CON
DATE OF ADMISSION:  11/16/2018

 

DATE OF CONSULTATION:  11/20/2018

 

REFERRING PHYSICIAN:  Gonzalo Linn M.D.

 

REASON FOR CONSULTATION:  Atrial arrhythmias.

 

HISTORY OF PRESENT ILLNESS:  Ms. Eid is a very pleasant 78-year-old 
woman.  She was admitted to [facility] for acute respiratory failure 
with hypoxia, septic shock and questionable right lung pneumonia.  She was 
admitted at [facility] in October and at that time was diagnosed with atrial 
fibrillation.  She was discharged to rehab and was transferred back to [facility] after having acute respiratory failure.  She has since been found to 
be back in atrial flutter and atrial fibrillation hospitalization with RVR.  
She was just recently started on Eliquis 5 mg b.i.d. and EP consult is for 
consideration of ablation for her atrial flutter.

 

Currently, Ms. Eid is resting comfortably in bed.  She denies any heart 
racing, palpitations, chest pain, pressure, syncope, near syncope, stroke or 
stroke-like symptoms.  She does report fatigue, shortness of breath, malaise 
and generalized weakness.  She is also very tired.  She had a recent left heart 
catheterization showing mild atherosclerotic heart disease.  An echocardiogram 
performed in October that showed preserved ejection fraction of approximately 60
%, moderate to severe tricuspid regurgitation.

 

REVIEW OF SYSTEMS:  Twelve-point review of systems was conducted and is 
negative except that listed above in the HPI.

 

PAST MEDICAL HISTORY:  Includes hypertension, high cholesterol, hypothyroidism, 
congestive heart failure, diastolic dysfunction, COPD, and tobacco abuse and 
atrial fibrillation, atrial flutter.

 

PAST SURGICAL HISTORY:  Appendectomy, cholecystectomy, hernia repair, 
orthopedic surgeries.

 

SOCIAL HISTORY:  Negative for tobacco, alcohol, or illicit drugs.  Lives at 
home independently until recent health issues.  Has a daughter that is close to 
her.

 

FAMILY HISTORY:  Negative for heart disease.

 

ALLERGIES:  HYDRALAZINE.

 

HOME MEDICATIONS:  Nicoderm CQ, Tylenol No. 3 as needed, cefdinir 300 mg p.o. 
b.i.d., Tessalon Perles 3 times a day, Lipitor daily, aspirin 81 mg daily, 
Eliquis 5 mg b.i.d., Zofran 4 mg as needed, MS Contin 60 mg p.o. t.i.d., Dulera 
inhaler b.i.d., levothyroxine 50 mcg daily, DuoNeb q.i.d., gabapentin 600 mg 
daily, diltiazem  mg daily, digoxin 0.125 mg p.o. daily, senna b.i.d. 
p.r.n. and Medrol Dosepak as directed.

 

PHYSICAL EXAMINATION:

VITAL SIGNS:  Temperature 98.0, pulse 67, blood pressure 157/74, respirations 18
, oxygen is 91% on 1 liter nasal cannula.

GENERAL:  The patient is rather small stature and frail appearing.  She appears 
malnourished and underweight.  She is mostly asleep during the exam, but will 
awake to voice and physical stimulation.  She is alert and oriented.  Her 
speech is clear.  Affect is appropriate and sleepy.

NECK:  Supple without jugular venous distention.

LUNGS:  Have fine crackles and wheezes bilaterally.  Respirations are even and 
unlabored with good bilateral excursion.

HEART:  Heart rate is regularly regular currently.  PMI nondisplaced.

ABDOMEN:  Soft, nontender without palpable masses.  Hepatojugular reflux is 
negative.

EXTREMITIES:  Bilateral lower extremities exhibit 1+ edema.

NEUROLOGIC:  Grossly intact and nonfocal.

 

DATABASE:  INR 1.4, hemoglobin 12.2, hematocrit 38.6, WBC 13.8, platelet count 
318.  Potassium 3.7, creatinine 0.86, AST and ALT are within normal limits.  
BNP 1293.

 

Telemetry and EKG reviewed, mostly show atrial fibrillation with varying 
ventricular rates.  There is 1 tracing in the chart, but could indicate typical 
atrial flutter that likely indicate typical atrial flutter currently in sinus 
rhythm.

 

Echocardiogram from October shows preserved ejection fraction of approximately 
60% with moderate to severe tricuspid regurgitation.

 

IMPRESSION:

1.  Paroxysmal atrial flutter and atrial fibrillation with rapid ventricular 
response currently in sinus rhythm.

2.  Respiratory failure secondary to pneumonia.

3.  Diastolic dysfunction with congestive heart failure with an elevated BNP.

4.  Urinary tract infection, questionable.

5.  Malnutrition with low albumin.

6.  Oral anticoagulation with Eliquis.  Her CHADS-VASc score of 5.

7.  Chronic obstructive pulmonary disease.

 

PLAN:

1.  I have ordered Multaq 400 mg p.o. b.i.d.  We will continue that while she 
is in the hospital though this may not be financially an option as an 
outpatient.  We will watch her QT for possible prolongation and if this is seen
, may need to consider alternate agents that are also known to provoke QT 
prolongation like Levaquin.

2.  Continue oral anticoagulation and monitor for bleeding.

3.  Could consider CTI ablation for her flutter.  If this recurs despite of 
arrhythmia suppression and once she is medically and more stable condition and 
better compensated.  For now, would recommend just arrhythmia suppression and 
continued anticoagulation.  This is discussed with the patient and her daughter 
who both prefer conservative medical management at this time.

 

Thank you for allowing us to participate in the care of this patient.

 

MAKAYLA

## 2018-11-21 LAB
ANION GAP SERPL CALC-SCNC: 19 MMOL/L (ref 10–20)
BUN SERPL-MCNC: 18 MG/DL (ref 9.8–20.1)
CALCIUM SERPL-MCNC: 9.2 MG/DL (ref 7.8–10.44)
CHLORIDE SERPL-SCNC: 92 MMOL/L (ref 98–107)
CO2 SERPL-SCNC: 29 MMOL/L (ref 23–31)
CREAT CL PREDICTED SERPL C-G-VRATE: 64 ML/MIN (ref 70–130)
GLUCOSE SERPL-MCNC: 103 MG/DL (ref 83–110)
POTASSIUM SERPL-SCNC: 3.5 MMOL/L (ref 3.5–5.1)
SODIUM SERPL-SCNC: 136 MMOL/L (ref 136–145)

## 2018-11-21 RX ADMIN — SODIUM PHOSPHATE PRN ML: 7; 19 ENEMA RECTAL at 11:02

## 2018-11-21 RX ADMIN — SODIUM PHOSPHATE PRN ML: 7; 19 ENEMA RECTAL at 03:10

## 2018-11-21 RX ADMIN — Medication SCH ML: at 00:16

## 2018-11-21 RX ADMIN — Medication SCH ML: at 21:49

## 2018-11-21 RX ADMIN — Medication SCH ML: at 10:09

## 2018-11-21 RX ADMIN — MOMETASONE FUROATE AND FORMOTEROL FUMARATE DIHYDRATE SCH PUFF: 200; 5 AEROSOL RESPIRATORY (INHALATION) at 19:31

## 2018-11-21 RX ADMIN — ASPIRIN SCH MG: 81 TABLET ORAL at 10:07

## 2018-11-21 RX ADMIN — MOMETASONE FUROATE AND FORMOTEROL FUMARATE DIHYDRATE SCH PUFF: 200; 5 AEROSOL RESPIRATORY (INHALATION) at 07:16

## 2018-11-21 NOTE — PDOC.PN
- Subjective


Encounter Start Date: 11/21/18


Encounter Start Time: 10:40


Subjective: awake, no chest pain or palp


-: no sob, wants something for constipation





- Objective


Resuscitation Status: 


 











Resuscitation Status           FULL:Full Resuscitation














MAR Reviewed: Yes


Vital Signs & Weight: 


 Vital Signs (12 hours)











  Temp Pulse Resp BP BP Pulse Ox


 


 11/21/18 10:07   70    


 


 11/21/18 08:00  97.7 F  70  20   120/65  90 L


 


 11/21/18 07:13   82  16    94 L


 


 11/21/18 04:00  98.3 F  80  20   146/80 H  93 L


 


 11/21/18 01:00      172/70 H 


 


 11/21/18 00:14     187/86 H  








 Weight











Admit Weight                   156 lb 11.979 oz


 


Weight                         156 lb 11.979 oz











 Most Recent Monitor Data











Heart Rate from ECG            75


 


NIBP                           120/56


 


NIBP BP-Mean                   77


 


Respiration from ECG           12


 


SpO2                           95














I&O: 


 











 11/20/18 11/21/18 11/22/18





 06:59 06:59 06:59


 


Intake Total  750 


 


Output Total 800 1400 


 


Balance -800 -650 











Result Diagrams: 


 11/20/18 11:23





 11/21/18 04:01





Phys Exam





- Physical Examination


HEENT: PERRLA, moist MMs


Neck: no JVD, supple


Respiratory: no wheezing, no rales


Cardiovascular: no significant murmur, irregular


Gastrointestinal: soft, non-tender, positive bowel sounds


Musculoskeletal: no edema, pulses present


Neurological: non-focal, moves all 4 limbs


Psychiatric: normal affect, A&O x 3





Dx/Plan


(1) Atrial fibrillation with RVR


Code(s): I48.91 - UNSPECIFIED ATRIAL FIBRILLATION   Status: Acute   Comment: 

rate controlled   





(2) Acute exacerbation of CHF (congestive heart failure)


Code(s): I50.9 - HEART FAILURE, UNSPECIFIED   Status: Acute   


Qualifiers: 


   Heart failure type: diastolic   Qualified Code(s): I50.33 - Acute on chronic 

diastolic (congestive) heart failure   





(3) Sepsis


Code(s): A41.9 - SEPSIS, UNSPECIFIED ORGANISM   Status: Suspected   


Qualifiers: 


   Sepsis type: sepsis due to unspecified organism   Qualified Code(s): A41.9 - 

Sepsis, unspecified organism   





(4) PNA (pneumonia)


Code(s): J18.9 - PNEUMONIA, UNSPECIFIED ORGANISM   Status: Suspected   


Qualifiers: 


   Pneumonia type: due to unspecified organism   Laterality: bilateral 





(5) Acute metabolic encephalopathy


Code(s): G93.41 - METABOLIC ENCEPHALOPATHY   Status: Resolved   





(6) Acute respiratory failure with hypoxia


Code(s): J96.01 - ACUTE RESPIRATORY FAILURE WITH HYPOXIA   Status: Resolved   





(7) CAD (coronary artery disease)


Code(s): I25.10 - ATHSCL HEART DISEASE OF NATIVE CORONARY ARTERY W/O ANG PCTRS 

  Status: Chronic   


Qualifiers: 


   Coronary Disease-Associated Artery/Lesion type: native artery   Native vs. 

transplanted heart: native heart   Associated angina: without angina   

Qualified Code(s): I25.10 - Atherosclerotic heart disease of native coronary 

artery without angina pectoris   





(8) COPD (chronic obstructive pulmonary disease)


Status: Chronic   





(9) Chronic pain syndrome


Code(s): G89.4 - CHRONIC PAIN SYNDROME   Status: Chronic   Comment: Resume home 

MS Contin, Gabapentin, Norco   





(10) Hypertension


Code(s): I10 - ESSENTIAL (PRIMARY) HYPERTENSION   Status: Chronic   


Qualifiers: 


   Hypertension type: essential hypertension   Qualified Code(s): I10 - 

Essential (primary) hypertension   


Comment: controlled   





- Plan


on multaq, cardizem daily, off dig for now


-: eliquis full dose, levaquin, nebs, lasix


-: watch for qt prolongation


-: asp, lipitor, prednisone taper, bowel regimen, is on narcotics


-: PT to mobilize as tolerated, will be ready for dc in am if telemetry is nor





* .








Review of Systems





- Medications/Allergies


Allergies/Adverse Reactions: 


 Allergies











Allergy/AdvReac Type Severity Reaction Status Date / Time


 


hydralazine Allergy   Verified 01/27/15 22:10











Medications: 


 Current Medications





Acetaminophen (Tylenol)  650 mg PO Q4H PRN


   PRN Reason: Headache/Fever/Mild Pain (1-3)


   Last Admin: 11/20/18 02:13 Dose:  650 mg


Albuterol/Ipratropium (Duoneb)  3 ml NEB QID-RT [facility]


   Last Admin: 11/21/18 07:13 Dose:  3 ml


Apixaban (Eliquis)  5 mg PO BID [facility]


   Last Admin: 11/21/18 10:07 Dose:  5 mg


Aspirin (Ecotrin)  81 mg PO DAILY [facility]


   Last Admin: 11/21/18 10:07 Dose:  81 mg


Atorvastatin Calcium (Lipitor)  20 mg PO HS [facility]


   Last Admin: 11/20/18 23:29 Dose:  20 mg


Benzonatate (Tessalon)  200 mg PO TID [facility]


   Last Admin: 11/21/18 10:07 Dose:  200 mg


Clonidine (Catapres)  0.1 mg PO BIDPRN PRN


   PRN Reason: Blood Pressure


   Last Admin: 11/21/18 00:14 Dose:  0.1 mg


Diltiazem HCl (Cardizem Cd)  120 mg PO DAILY [facility]


   Last Admin: 11/21/18 10:07 Dose:  120 mg


Dronedarone (Multaq)  400 mg PO BID-WM [facility]


   Last Admin: 11/21/18 10:07 Dose:  400 mg


Famotidine (Pepcid)  20 mg PO BID [facility]


   Last Admin: 11/21/18 10:08 Dose:  20 mg


Furosemide (Lasix)  20 mg PO 0900,1400 [facility]


   Last Admin: 11/21/18 10:08 Dose:  20 mg


Levofloxacin (Levaquin)  500 mg PO 0600 [facility]


   Last Admin: 11/21/18 07:39 Dose:  500 mg


Levothyroxine Sodium (Synthroid)  50 mcg PO 0600 [facility]


   Last Admin: 11/21/18 07:39 Dose:  50 mcg


Mometasone Furoate/Formoterol Fumar (Dulera 200 Mcg/5 Mcg Inhaler)  1 puff INH 

BID-RT [facility]


   Last Admin: 11/21/18 07:16 Dose:  1 puff


Morphine Sulfate (Ms Contin)  60 mg PO TID [facility]


   Last Admin: 11/21/18 10:06 Dose:  60 mg


Ondansetron HCl (Zofran Odt)  4 mg PO Q6H PRN


   PRN Reason: Nausea/Vomiting


   Last Admin: 11/21/18 03:10 Dose:  4 mg


Ondansetron HCl (Zofran)  4 mg IVP Q6H PRN


   PRN Reason: Nausea/Vomiting


   Last Admin: 11/16/18 10:58 Dose:  4 mg


Prednisone (Prednisone)  20 mg PO QAM[facility]


   Last Admin: 11/21/18 10:08 Dose:  20 mg


Senna/Docusate Sodium (Senokot S)  2 tab PO BIDPRN PRN


   PRN Reason: Constipation


   Last Admin: 11/19/18 16:06 Dose:  2 tab


Sertraline HCl (Zoloft)  50 mg PO DAILY [facility]


   Last Admin: 11/21/18 10:08 Dose:  50 mg


Sodium Biphosphate/Sodium Phosphate (Fleet Enema)  133 ml VT DAILYPRN PRN


   PRN Reason: Constipation


   Last Admin: 11/21/18 11:02 Dose:  133 ml


Sodium Chloride (Flush - Normal Saline)  10 ml IVF PRN PRN


   PRN Reason: Saline Flush


Sodium Chloride (Flush - Normal Saline)  10 ml IVF Q12HR [facility]


   Last Admin: 11/21/18 10:09 Dose:  10 ml

## 2018-11-21 NOTE — PRG
DATE OF SERVICE:  11/21/2018 

 

Ms. Eid is feeling better.  She is in better spirits today.  No chest pain or pressure.

 

PHYSICAL EXAMINATION:

VITAL SIGNS:  Blood pressure 120/65, pulse 70, it is regular.

LUNGS:  Clear.

CARDIAC:  Normal S1, normal S2.

ABDOMEN:  Soft and nontender.

 

ASSESSMENT:

1.  Paroxysmal atrial fibrillation and atrial flutter.

2.  She has been started on Multaq by Dr. Lazo yesterday.  He recommends medical therapy.

 

PLAN:  

1.  Continue current medical regimen.  The patient from a cardiac standpoint could be sent back to [facility] rehab facility.  Dr. Romano is her primary cardiologist.  

2.  We will give her 1 extra dose of potassium today.

3.  She is now on Multaq and moderate dose Cardizem, will stop digoxin.

## 2018-11-22 ENCOUNTER — HOSPITAL ENCOUNTER (INPATIENT)
Dept: HOSPITAL 9 - MADMS | Age: 78
LOS: 20 days | Discharge: HOME | DRG: 947 | End: 2018-12-12
Attending: FAMILY MEDICINE | Admitting: FAMILY MEDICINE
Payer: MEDICARE

## 2018-11-22 VITALS — BODY MASS INDEX: 24.8 KG/M2

## 2018-11-22 VITALS — SYSTOLIC BLOOD PRESSURE: 104 MMHG | DIASTOLIC BLOOD PRESSURE: 59 MMHG | TEMPERATURE: 98.5 F

## 2018-11-22 DIAGNOSIS — Z79.82: ICD-10-CM

## 2018-11-22 DIAGNOSIS — R26.81: ICD-10-CM

## 2018-11-22 DIAGNOSIS — F17.200: ICD-10-CM

## 2018-11-22 DIAGNOSIS — J18.9: ICD-10-CM

## 2018-11-22 DIAGNOSIS — Z99.81: ICD-10-CM

## 2018-11-22 DIAGNOSIS — Z79.899: ICD-10-CM

## 2018-11-22 DIAGNOSIS — I11.0: ICD-10-CM

## 2018-11-22 DIAGNOSIS — I25.10: ICD-10-CM

## 2018-11-22 DIAGNOSIS — I48.0: ICD-10-CM

## 2018-11-22 DIAGNOSIS — R53.1: Primary | ICD-10-CM

## 2018-11-22 DIAGNOSIS — J96.21: ICD-10-CM

## 2018-11-22 DIAGNOSIS — G89.4: ICD-10-CM

## 2018-11-22 DIAGNOSIS — I50.33: ICD-10-CM

## 2018-11-22 DIAGNOSIS — K59.00: ICD-10-CM

## 2018-11-22 DIAGNOSIS — J44.9: ICD-10-CM

## 2018-11-22 DIAGNOSIS — I48.92: ICD-10-CM

## 2018-11-22 DIAGNOSIS — K21.9: ICD-10-CM

## 2018-11-22 RX ADMIN — ASPIRIN SCH MG: 81 TABLET ORAL at 08:23

## 2018-11-22 RX ADMIN — MOMETASONE FUROATE AND FORMOTEROL FUMARATE DIHYDRATE SCH PUFF: 200; 5 AEROSOL RESPIRATORY (INHALATION) at 07:50

## 2018-11-22 RX ADMIN — Medication SCH ML: at 08:25

## 2018-11-22 NOTE — PDOC.CTH
Cardiology Progress Note





- Subjective





No new issues. No new complaints. 





- Objective


 Vital Signs











  Temp Pulse Resp BP Pulse Ox


 


 11/22/18 10:50   68  16  


 


 11/22/18 08:25   69   


 


 11/22/18 08:06      95


 


 11/22/18 07:50   69  18   95


 


 11/22/18 07:45  98.5 F  66  18  104/59 L  90 L


 


 11/22/18 04:00  98.4 F  60  18  118/55 L  93 L








 











Admit Weight                   156 lb 11.979 oz


 


Weight                         123 lb 3.2 oz














 











 11/21/18 11/22/18 11/23/18





 06:59 06:59 06:59


 


Intake Total 750 1266 240


 


Output Total 1400 475 


 


Balance -650 791 240














- Physical Examination


General/Neuro: alert & oriented x3, NAD


Neck: no JVD present


Lungs: CTA, unlabored respirations


Heart: RRR


Abdomen: NT/ND


Extremities: other: (no edema)





- Telemetry


Telemetry Rhythm: NSR





- Labs


Result Diagrams: 


 11/20/18 11:23





 11/21/18 04:01





- Assessment/Plan





1. Paroxysmal afib/flutter.


2. Panumonia





PLAN:


- Continue Multaq.


- Follow up with Dr. Romano as outpatient on discharge. 


- Will sign out. Please call with any questions.

## 2018-11-23 RX ADMIN — MOMETASONE FUROATE AND FORMOTEROL FUMARATE DIHYDRATE SCH PUFF: 200; 5 AEROSOL RESPIRATORY (INHALATION) at 19:34

## 2018-11-23 NOTE — HP
DATE OF ADMISSION:  2018

 

ADMITTING PHYSICIAN:  Delicia Vazquez M.D.

 

PRIMARY CARE PHYSICIAN:  Dorcas Howard M.D.

 

REASON FOR ADMISSION:  For skilled rehabilitation at [facility] 
[facility], status post hospitalization due to bilateral pneumonia, acute on 
chronic congestive heart failure and deconditioning.

 

HISTORY OF PRESENT ILLNESS:  Ms. Eid is a 78-year-old  female 
who was initially admitted to [facility] in [place] 10/28/2018 to 2018 for 
paroxysmal atrial fibrillation, non-ST elevation MI, hypoxic respiratory failure
, sepsis due to urinary tract infection and demand ischemia.  The patient was 
noted to be deconditioned and she was subsequently stabilized and transferred 
to inpatient rehabilitation.  The patient was readmitted to [facility] in [place] 
2018 due to acute respiratory failure, bilateral pneumonia, acute on 
chronic congestive heart failure.  During hospitalization, the patient was 
initially found to be hypoxic with O2 sats in the 80s and blood pressure in the 
70s.  She was given IV fluids for resuscitation.  She was admitted for 
presumptive sepsis with septic shock on presentation.  A chest x-ray showed 
bilateral pneumonia.  The patient was started on broad spectrum IV antibiotics 
and cultures were ordered.  Blood cultures and urine cultures remained negative 
until discharge.  The patient during hospitalization went into atrial 
fibrillation with RVR and she was seen by cardiologist, Dr. Linn and 
electrophysiologist, Dr. Lazo.  She was initially started on IV Cardizem and 
later transitioned to oral Cardizem.  She was seen by EP, Dr. Lazo who started 
the patient on Multaq 400 b.i.d. orally.  The patient was stabilized and rate 
controlled.  The patient's respiratory status subsequently improved, her white 
count improved and she was noted to be stable enough to be discharged back to 
swing bed for continuation of rehabilitation prior to discharge back to her 
home.  The patient was transitioned to oral antibiotic and discharged in a 
stable condition.  Upon evaluation of the patient today, she denied any fevers.
  She denied any nausea, vomiting, chest pain or shortness of breath.  The 
patient stated she had a bowel movement yesterday, but she would like some 
Dulcolax as she said she had been impacted for about 7 days prior to yesterday.
  She denies any abdominal pain.  She denies any falls.  The patient does have 
chronic pain and she is on MS Contin for this and she says this controls the 
pain pretty well.

 

PAST MEDICAL HISTORY:  Hypertension, hypothyroidism, osteoporosis, chronic back 
pain on narcotics, history of lung cancer, history of lumbar fusion, COPD, 
history of diastolic congestive heart failure, history of atrial fibrillation.

 

ALLERGIES:  No known drug allergies.

 

PAST SURGICAL HISTORY:  Cholecystectomy, history of lumbar fusion, left hip 
replacement, history of lung cancer with resection of the right side and hernia 
repair.

 

CURRENT MEDICATIONS:  Tessalon Perles p.r.n., Lipitor daily, Levaquin 500 daily 
x5 more days, clonidine 0.1 b.i.d. p.r.n., Zoloft 50 daily, levothyroxine 50 
daily, Lasix 20 b.i.d., Dulera 1 puff b.i.d., Pepcid 20 b.i.d., Multaq 400 
b.i.d., Eliquis 5 mg daily, clonidine 0.1 b.i.d. p.r.n., aspirin 81 daily, 
DuoNeb 3 mL q.i.d. routine and q.i.d. p.r.n., Cardizem 120 daily.

 

CODE STATUS:  The patient is a "full code."

 

SOCIAL HISTORY:  The patient smoked 1 pack of cigarettes daily until admission.
  Denies any alcohol use, denies any illicit drug use.

 

FAMILY HISTORY:  Mother  in her 80s.  Father  at the age of 87.

 

REVIEW OF SYSTEMS:  General:  The patient denies any fever or chills.  
Complains of generalized weakness.  HEENT:  No vision changes, hearing changes 
or dysphagia.  Chest:  No chest pain, shortness of breath, palpitations or 
dizziness.  Respiratory:  Denies any cough, shortness of breath or wheezing.  
Abdomen:  Complains of constipation.  Denies any nausea or abdominal pain.  
Genitourinary:  Denies dysuria or hematuria.  Skin:  Complains of bruising to 
the upper extremities from multiple blood draws during hospitalization.  
Musculoskeletal:  Complains of back pain, gait instability.  Neurological/
Psychiatric:  Denies hallucinations, delusions or memory loss.

 

PHYSICAL EXAMINATION:

VITAL SIGNS:  Temperature 97.9, pulse 59, respirations 18, O2 sat 90% on 2 
liters nasal cannula, blood pressure 181/75.

GENERAL:  Alert, awake, oriented x3, lying in bed, no apparent distress.

HEENT:  Normocephalic, atraumatic.  PERRL.  Nonicteric sclerae.  Oral mucous 
membranes moist.

NECK:  Supple.  No JVD.

LUNGS:  Clear to auscultation bilaterally.

CARDIAC:  S1, S2.  No murmurs, no rubs, no gallops.

ABDOMEN:  Positive bowel sounds, soft, nontender, nondistended.

EXTREMITIES:  No calf swelling or erythema.

SKIN:  Bilateral upper extremities with multiple bruising from blood draws.  No 
skin tears noted.

NEUROLOGICAL:  Cranial nerves II-XII grossly intact.

 

ASSESSMENT:

1.  Physical deconditioning.

2.  Bilateral pneumonia.

3.  Paroxysmal atrial fibrillation and atrial flutter on Multaq.

4.  Acute on chronic congestive diastolic heart failure.

5.  Hypertension.

6.  Chronic pain syndrome.

7.  Coronary artery disease.

8.  Acute hypoxic respiratory failure.

 

PLAN:  The patient is being admitted to [facility] extended swing bed 
for rehabilitation and gait strengthening.  We will consult physical therapy 
for strengthening in order to gain modified independence with gait and 
occupational therapy to help with activities of daily living prior to returning 
to her home.  We will restart all home medications.  We will continue Levaquin 
500 daily for 5 more days.  We will place the patient on Dulcolax p.r.n. as 
requested.  We will monitor closely for any medical comorbidities that might 
interfere with rehabilitation process.  We will place the patient on GI 
prophylaxis with Pepcid and for DVT prophylaxis, the patient is on Eliquis 
b.i.d.

 

We will monitor the patient's O2 saturations and she is on oxygen currently and 
as needed.  We will slowly try to taper off the oxygen as deemed necessary.

 

Extended length of stay, 2-3 weeks.

 

DISPOSITION:  Home.

 

[facility]

## 2018-11-23 NOTE — PRG
DATE OF SERVICE:  11/21/2018

 

SUBJECTIVE:  Mrs. Eid seems to be doing better today.  She had atrial fibrillation overnight ha
d some constipation-like symptoms, but this morning, she converted back to sinus rhythm, currently sh
e is feeling well.

 

OBJECTIVE:

VITAL SIGNS:  Blood pressure 136/54, heart rate 67, respiration is 20, temperature 98.4 degrees Fahre
nheit.

GENERAL:  She is alert and oriented woman in no apparent distress.

NECK:  Supple.  Jugular veins not distended.

CHEST:  Coarse without crackles.

CARDIOVASCULAR:  Heart sounds are regular to rate and rhythm.  No murmur or gallop.

ABDOMEN:  Benign.  Bowel sounds positive.

EXTREMITIES:  Lower extremities without edema, clubbing or cyanosis.

 

DATABASE:  The telemetry strips revealing atypical atrial flutter spells with moderate ventricular ra
te control converting to sinus rhythm.

 

EKG reveals QTC of 465.  No significant change.

 

LABORATORY DATA:  White count 13.8, hemoglobin 12.2, platelet count is about 318,000.  INR 1.4.  Sodi
um 136, potassium 3.5, BUN 18, creatinine 0.82.

 

ASSESSMENT AND PLAN:  Mrs. Eid is a pleasant 78-year-old woman with a history of paroxysmal atr
ial fibrillation and flutter.  She had recurrent respiratory distress and developed frequent rapid at
rial fibrillation with RVR.  Some EKGs suggested CTI circuit operational, other EKGs suggest multifoc
al left atrial _____ .

 

At this point, we will try medical management which is in keeping with the patient and the daughter's
 wishes.  Multaq will be initiated.  Monitor for bradycardia.  We would continue Eliquis.  Discussed 
with Dr. Lazcano, the pulmonary just regarding her antibiotic Levaquin which may need to be adjusted if 
QT prolongs.

 

If typical atrial flutter with rapid rates recur, _____ ;although that would be _____ in frail, cache
ctic, elderly lady.

## 2018-11-24 RX ADMIN — MOMETASONE FUROATE AND FORMOTEROL FUMARATE DIHYDRATE SCH PUFF: 200; 5 AEROSOL RESPIRATORY (INHALATION) at 05:59

## 2018-11-24 RX ADMIN — MOMETASONE FUROATE AND FORMOTEROL FUMARATE DIHYDRATE SCH PUFF: 200; 5 AEROSOL RESPIRATORY (INHALATION) at 19:34

## 2018-11-24 RX ADMIN — ASPIRIN SCH MG: 81 TABLET ORAL at 08:29

## 2018-11-25 RX ADMIN — MOMETASONE FUROATE AND FORMOTEROL FUMARATE DIHYDRATE SCH PUFF: 200; 5 AEROSOL RESPIRATORY (INHALATION) at 18:03

## 2018-11-25 RX ADMIN — LANOLIN SCH APPLIC: 500 CREAM TOPICAL at 08:29

## 2018-11-25 RX ADMIN — ASPIRIN SCH MG: 81 TABLET ORAL at 08:28

## 2018-11-25 RX ADMIN — LANOLIN SCH APPLIC: 500 CREAM TOPICAL at 20:09

## 2018-11-25 RX ADMIN — MOMETASONE FUROATE AND FORMOTEROL FUMARATE DIHYDRATE SCH PUFF: 200; 5 AEROSOL RESPIRATORY (INHALATION) at 05:55

## 2018-11-26 RX ADMIN — ASPIRIN SCH MG: 81 TABLET ORAL at 08:34

## 2018-11-26 RX ADMIN — MOMETASONE FUROATE AND FORMOTEROL FUMARATE DIHYDRATE SCH PUFF: 200; 5 AEROSOL RESPIRATORY (INHALATION) at 19:39

## 2018-11-26 RX ADMIN — LANOLIN SCH APPLIC: 500 CREAM TOPICAL at 08:35

## 2018-11-26 RX ADMIN — MOMETASONE FUROATE AND FORMOTEROL FUMARATE DIHYDRATE SCH PUFF: 200; 5 AEROSOL RESPIRATORY (INHALATION) at 05:42

## 2018-11-26 RX ADMIN — LANOLIN SCH APPLIC: 500 CREAM TOPICAL at 20:08

## 2018-11-27 RX ADMIN — LANOLIN SCH APPLIC: 500 CREAM TOPICAL at 20:31

## 2018-11-27 RX ADMIN — LANOLIN SCH APPLIC: 500 CREAM TOPICAL at 11:30

## 2018-11-27 RX ADMIN — MOMETASONE FUROATE AND FORMOTEROL FUMARATE DIHYDRATE SCH: 200; 5 AEROSOL RESPIRATORY (INHALATION) at 05:59

## 2018-11-27 RX ADMIN — ASPIRIN SCH MG: 81 TABLET ORAL at 11:32

## 2018-11-27 RX ADMIN — MOMETASONE FUROATE AND FORMOTEROL FUMARATE DIHYDRATE SCH PUFF: 200; 5 AEROSOL RESPIRATORY (INHALATION) at 18:15

## 2018-11-28 RX ADMIN — LANOLIN SCH APPLIC: 500 CREAM TOPICAL at 20:54

## 2018-11-28 RX ADMIN — MOMETASONE FUROATE AND FORMOTEROL FUMARATE DIHYDRATE SCH PUFF: 200; 5 AEROSOL RESPIRATORY (INHALATION) at 07:30

## 2018-11-28 RX ADMIN — ASPIRIN SCH MG: 81 TABLET ORAL at 08:33

## 2018-11-28 RX ADMIN — LANOLIN SCH APPLIC: 500 CREAM TOPICAL at 08:33

## 2018-11-28 RX ADMIN — MOMETASONE FUROATE AND FORMOTEROL FUMARATE DIHYDRATE SCH PUFF: 200; 5 AEROSOL RESPIRATORY (INHALATION) at 19:16

## 2018-11-29 RX ADMIN — ASPIRIN SCH MG: 81 TABLET ORAL at 08:12

## 2018-11-29 RX ADMIN — LANOLIN SCH APPLIC: 500 CREAM TOPICAL at 20:07

## 2018-11-29 RX ADMIN — MOMETASONE FUROATE AND FORMOTEROL FUMARATE DIHYDRATE SCH PUFF: 200; 5 AEROSOL RESPIRATORY (INHALATION) at 08:06

## 2018-11-29 RX ADMIN — MOMETASONE FUROATE AND FORMOTEROL FUMARATE DIHYDRATE SCH PUFF: 200; 5 AEROSOL RESPIRATORY (INHALATION) at 20:05

## 2018-11-29 RX ADMIN — LANOLIN SCH APPLIC: 500 CREAM TOPICAL at 08:14

## 2018-11-30 RX ADMIN — LANOLIN SCH APPLIC: 500 CREAM TOPICAL at 20:45

## 2018-11-30 RX ADMIN — LANOLIN SCH APPLIC: 500 CREAM TOPICAL at 08:55

## 2018-11-30 RX ADMIN — MOMETASONE FUROATE AND FORMOTEROL FUMARATE DIHYDRATE SCH PUFF: 200; 5 AEROSOL RESPIRATORY (INHALATION) at 08:19

## 2018-11-30 RX ADMIN — MOMETASONE FUROATE AND FORMOTEROL FUMARATE DIHYDRATE SCH PUFF: 200; 5 AEROSOL RESPIRATORY (INHALATION) at 19:46

## 2018-11-30 RX ADMIN — ASPIRIN SCH MG: 81 TABLET ORAL at 08:54

## 2018-12-01 RX ADMIN — LANOLIN SCH APPLIC: 500 CREAM TOPICAL at 09:40

## 2018-12-01 RX ADMIN — LANOLIN SCH APPLIC: 500 CREAM TOPICAL at 20:28

## 2018-12-01 RX ADMIN — MOMETASONE FUROATE AND FORMOTEROL FUMARATE DIHYDRATE SCH PUFF: 200; 5 AEROSOL RESPIRATORY (INHALATION) at 19:37

## 2018-12-01 RX ADMIN — ASPIRIN SCH MG: 81 TABLET ORAL at 09:39

## 2018-12-01 RX ADMIN — MOMETASONE FUROATE AND FORMOTEROL FUMARATE DIHYDRATE SCH PUFF: 200; 5 AEROSOL RESPIRATORY (INHALATION) at 08:15

## 2018-12-02 RX ADMIN — MOMETASONE FUROATE AND FORMOTEROL FUMARATE DIHYDRATE SCH PUFF: 200; 5 AEROSOL RESPIRATORY (INHALATION) at 07:29

## 2018-12-02 RX ADMIN — LANOLIN SCH APPLIC: 500 CREAM TOPICAL at 08:44

## 2018-12-02 RX ADMIN — LANOLIN SCH APPLIC: 500 CREAM TOPICAL at 20:38

## 2018-12-02 RX ADMIN — ASPIRIN SCH MG: 81 TABLET ORAL at 08:41

## 2018-12-02 RX ADMIN — MOMETASONE FUROATE AND FORMOTEROL FUMARATE DIHYDRATE SCH PUFF: 200; 5 AEROSOL RESPIRATORY (INHALATION) at 19:50

## 2018-12-03 RX ADMIN — MOMETASONE FUROATE AND FORMOTEROL FUMARATE DIHYDRATE SCH PUFF: 200; 5 AEROSOL RESPIRATORY (INHALATION) at 07:30

## 2018-12-03 RX ADMIN — LANOLIN SCH APPLIC: 500 CREAM TOPICAL at 08:55

## 2018-12-03 RX ADMIN — LANOLIN SCH APPLIC: 500 CREAM TOPICAL at 21:15

## 2018-12-03 RX ADMIN — ASPIRIN SCH MG: 81 TABLET ORAL at 08:54

## 2018-12-03 RX ADMIN — MOMETASONE FUROATE AND FORMOTEROL FUMARATE DIHYDRATE SCH PUFF: 200; 5 AEROSOL RESPIRATORY (INHALATION) at 19:09

## 2018-12-04 RX ADMIN — LANOLIN SCH APPLIC: 500 CREAM TOPICAL at 20:57

## 2018-12-04 RX ADMIN — MOMETASONE FUROATE AND FORMOTEROL FUMARATE DIHYDRATE SCH PUFF: 200; 5 AEROSOL RESPIRATORY (INHALATION) at 18:11

## 2018-12-04 RX ADMIN — LANOLIN SCH APPLIC: 500 CREAM TOPICAL at 08:26

## 2018-12-04 RX ADMIN — ASPIRIN SCH MG: 81 TABLET ORAL at 08:25

## 2018-12-04 RX ADMIN — MOMETASONE FUROATE AND FORMOTEROL FUMARATE DIHYDRATE SCH PUFF: 200; 5 AEROSOL RESPIRATORY (INHALATION) at 07:36

## 2018-12-05 RX ADMIN — LANOLIN SCH APPLIC: 500 CREAM TOPICAL at 20:31

## 2018-12-05 RX ADMIN — ASPIRIN SCH MG: 81 TABLET ORAL at 08:29

## 2018-12-05 RX ADMIN — LANOLIN SCH APPLIC: 500 CREAM TOPICAL at 08:32

## 2018-12-05 RX ADMIN — MOMETASONE FUROATE AND FORMOTEROL FUMARATE DIHYDRATE SCH PUFF: 200; 5 AEROSOL RESPIRATORY (INHALATION) at 19:27

## 2018-12-05 RX ADMIN — MOMETASONE FUROATE AND FORMOTEROL FUMARATE DIHYDRATE SCH PUFF: 200; 5 AEROSOL RESPIRATORY (INHALATION) at 06:14

## 2018-12-06 RX ADMIN — LANOLIN SCH APPLIC: 500 CREAM TOPICAL at 20:59

## 2018-12-06 RX ADMIN — ASPIRIN SCH MG: 81 TABLET ORAL at 08:41

## 2018-12-06 RX ADMIN — MOMETASONE FUROATE AND FORMOTEROL FUMARATE DIHYDRATE SCH: 200; 5 AEROSOL RESPIRATORY (INHALATION) at 19:00

## 2018-12-06 RX ADMIN — LANOLIN SCH APPLIC: 500 CREAM TOPICAL at 08:42

## 2018-12-06 RX ADMIN — MOMETASONE FUROATE AND FORMOTEROL FUMARATE DIHYDRATE SCH PUFF: 200; 5 AEROSOL RESPIRATORY (INHALATION) at 07:25

## 2018-12-07 RX ADMIN — ASPIRIN SCH MG: 81 TABLET ORAL at 08:44

## 2018-12-07 RX ADMIN — LANOLIN SCH APPLIC: 500 CREAM TOPICAL at 20:01

## 2018-12-07 RX ADMIN — LANOLIN SCH APPLIC: 500 CREAM TOPICAL at 08:45

## 2018-12-08 RX ADMIN — LANOLIN SCH APPLIC: 500 CREAM TOPICAL at 20:00

## 2018-12-08 RX ADMIN — ASPIRIN SCH MG: 81 TABLET ORAL at 08:27

## 2018-12-08 RX ADMIN — LANOLIN SCH APPLIC: 500 CREAM TOPICAL at 08:28

## 2018-12-09 RX ADMIN — LANOLIN SCH APPLIC: 500 CREAM TOPICAL at 08:33

## 2018-12-09 RX ADMIN — LANOLIN SCH APPLIC: 500 CREAM TOPICAL at 20:14

## 2018-12-09 RX ADMIN — ASPIRIN SCH MG: 81 TABLET ORAL at 08:32

## 2018-12-10 RX ADMIN — LANOLIN SCH APPLIC: 500 CREAM TOPICAL at 08:26

## 2018-12-10 RX ADMIN — LANOLIN SCH APPLIC: 500 CREAM TOPICAL at 20:08

## 2018-12-10 RX ADMIN — ASPIRIN SCH MG: 81 TABLET ORAL at 08:16

## 2018-12-11 RX ADMIN — LANOLIN SCH: 500 CREAM TOPICAL at 20:19

## 2018-12-11 RX ADMIN — ASPIRIN SCH MG: 81 TABLET ORAL at 08:41

## 2018-12-11 RX ADMIN — LANOLIN SCH APPLIC: 500 CREAM TOPICAL at 08:45

## 2018-12-12 VITALS — DIASTOLIC BLOOD PRESSURE: 58 MMHG | SYSTOLIC BLOOD PRESSURE: 132 MMHG

## 2018-12-12 VITALS — TEMPERATURE: 97.1 F

## 2018-12-12 RX ADMIN — LANOLIN SCH: 500 CREAM TOPICAL at 08:18

## 2018-12-12 RX ADMIN — ASPIRIN SCH MG: 81 TABLET ORAL at 08:19

## 2018-12-13 NOTE — DIS
DATE OF ADMISSION:  11/22/2018



DATE OF DISCHARGE:  12/12/2018



ADMITTING PHYSICIAN:  Dr. Donaldson.



PRIMARY CARE PHYSICIAN:  Dr. Howard.



DISCHARGE DIAGNOSES:  

1. Physical deconditioning/general weakness.

2. Bilateral pneumonia, treated, improved.

3. Paroxysmal atrial fibrillation and atrial flutter, on Multaq and Eliquis.

4. Acute on chronic congestive heart failure.

5. Acute on chronic hypoxic respiratory failure, deemed  from a cute pneumonia- 
improved. O2 dependent.

6.  Hypertension.

7. Coronary artery disease.

8.Chronic pain syndrome

9. . Chronic gastroesophageal reflux disease.

10. Unsteady gait..

11. Chronic tobacco use.

12. History  of  sepsis, deemed  from UTI, treated. 



DISPOSITION:  Home.



CONDITION ON DISCHARGE:  Stable.



HOME MEDICATIONS:  

1. Eliquis 5 mg p.o. b.i.d.

2. Multaq 400 mg p.o. b.i.d.

3. Acetaminophen 650 mg q.4 p.r.n.

4. Aspirin 81 mg p.o. daily.

5. Atorvastatin 20 mg p.o. at bedtime.

6. Diltiazem 120 mg p.o. daily.

7. Furosemide 20 mg b.i.d.

8. DuoNeb q.i.d. p.r.n.

9. Maalox 30 mL q.6 p.r.n. for indigestion.

10. MS Contin 60 mg p.o. t.i.d.

11. Trazodone 50 mg p.o. at bedtime.

12. Sertraline 50 mg p.o. daily.

13. Polyethylene glycol 17 g p.o. at bedtime.

14. O2 per nasal cannula, continuous.

15. Levothyroxine 50 mcg p.o. q.a.m.

16. Nicotine patch 7 mg/patch daily.



DIET:  Fluid restriction 2000 mL per day, low salt, low fat.



ACTIVITY:  Ad everardo.  To use rolling walker at all times.



FOLLOWUP:  Follow up with Dr. Howard in one week or sooner with concern. 



DME needed including the rolling walker and portable O2 will be arranged prior 
to

discharge. 



Referred to [facility] for PT/OT. 



Fall precautions. 



Follow up with Dr. Lazo in 1 to 2 weeks for EP.   



Follow up with Cardiology in 2 to 3 weeks. 



Follow up with pulmonologist in 3 to 4 weeks.



HISTORY OF PRESENT ILLNESS AND HOSPITAL COURSE:  Ms. Eid is a very 
pleasant

78-year-old with multiple chronic medical conditions including COPD, chronic 
hypoxic respiratory failure, hypertension, chronic tobacco use, CHF, CAD.  She 
was admitted from [facility] on 10/28/2018 up to 11/06/
2018, for paroxymal atrial fibrillation, non-ST-elevation MI, and hypoxic 
respiratory failure with sepsis due to urinary tract infection and demand 
ischemia.  She was noted to be deconditioned post hospitalization and was 
subsequently transferred to inpatient rehabilitation in [place].  The patient was 
readmitted to [facility] on 11/16/2018, due to acute 
respiratory failure, noted to have bilateral pneumonia, acute on chronic 
congestive heart failure.  During this hospitalization, the patient was noted 
to be hypoxic with O2 saturations in the 80s and blood pressure in the 70s 
despite of the continuous home O2 therapy.  She was treated appropriately for 
presumptive sepsis with septic shock in the hospital.  She was also empirically 
treated with IV antibiotic.  During this time, the patient had an atrial 
fibrillation with RVR.  Dr. Linn followed the patient for Cardiology and Dr. Lazo for electrophysiologist.  She was initially started on Cardizem and later 
transitioned to oral Cardizem.  She was also started on Multaq 400 mg b.i.d. by 
Dr. Lazo on top of her Eliquis 5 mg p.o. b.i.d.  After the patient has been 
stabilized and clinically improved, she was transferred to [facility] due  to general  weakness and  deconditioned  
status. She was then transitioned from IV antibiotic to oral that  she had 
completed without  issues.  During her rehab course, she has been having issues 
with constipation, epigastric pain deemed to be from chronic GERD.  She was 
treated with PPI, H2 blocker and responded very well.  She has had issues with 
epigastric pain intermittently, food induced, and depending on what type of 
food she eats.  During this time, the patient agreed to start on nicotine patch 
and has not been smoking

since admission.  She will go home on nicotine patch 7 mg daily to complte  3 
weeks. She is very enthusiastic to complete the course of the nicotine patch 
and is hopeful that she could get rid of her tobacco use.  The patient has 
steadily  improved with her ambulation and overall functional status while in 
rehab.  On 12/12/2018, the patient was adamant to go home.  She was walking 200 
feet using her rolling walker prior to discharge.  Her vitals have been stable. 
She has  ahd  regular  bowel movements  with the use of  daily stools  
softeners.  She remains in continuous O2 per nasal cannula at 2 L, and she is 
recommended to continue wearing the O2 at home continuously.  The patient was 
deemed stable to go back home on 12/12/2018, thus discharged and was picked up 
by her son.  



Vital signs prior to discharge; blood pressure 132/58, O2 saturation 94 at 2 L 
per nasal cannula, temperature 97.7, pulse

67, respirations 18, weight 125 pounds. 



TIME SPENT:  Time spent on this discharge, 35 minutes in examining the patient 
and coordinating care. 







Job ID:  [id number]



MTDD

## 2019-01-28 ENCOUNTER — HOSPITAL ENCOUNTER (EMERGENCY)
Dept: HOSPITAL 9 - MADERS | Age: 79
LOS: 1 days | Discharge: ANOTHER HOSPITAL | End: 2019-01-29
Payer: MEDICARE

## 2019-01-28 DIAGNOSIS — Z79.51: ICD-10-CM

## 2019-01-28 DIAGNOSIS — I50.9: ICD-10-CM

## 2019-01-28 DIAGNOSIS — R07.9: Primary | ICD-10-CM

## 2019-01-28 DIAGNOSIS — I11.0: ICD-10-CM

## 2019-01-28 DIAGNOSIS — Z79.899: ICD-10-CM

## 2019-01-28 DIAGNOSIS — I48.91: ICD-10-CM

## 2019-01-28 DIAGNOSIS — Z79.82: ICD-10-CM

## 2019-01-28 DIAGNOSIS — J44.9: ICD-10-CM

## 2019-01-28 DIAGNOSIS — F17.210: ICD-10-CM

## 2019-01-28 PROCEDURE — 85025 COMPLETE CBC W/AUTO DIFF WBC: CPT

## 2019-01-28 PROCEDURE — 84484 ASSAY OF TROPONIN QUANT: CPT

## 2019-01-28 PROCEDURE — 93005 ELECTROCARDIOGRAM TRACING: CPT

## 2019-01-28 PROCEDURE — 96374 THER/PROPH/DIAG INJ IV PUSH: CPT

## 2019-01-28 PROCEDURE — 83880 ASSAY OF NATRIURETIC PEPTIDE: CPT

## 2019-01-28 PROCEDURE — 71045 X-RAY EXAM CHEST 1 VIEW: CPT

## 2019-01-28 PROCEDURE — 80053 COMPREHEN METABOLIC PANEL: CPT

## 2019-01-29 ENCOUNTER — HOSPITAL ENCOUNTER (OUTPATIENT)
Dept: HOSPITAL 92 - ERS | Age: 79
Setting detail: OBSERVATION
LOS: 1 days | Discharge: HOME | End: 2019-01-30
Attending: HOSPITALIST | Admitting: HOSPITALIST
Payer: MEDICARE

## 2019-01-29 VITALS — BODY MASS INDEX: 28.8 KG/M2

## 2019-01-29 DIAGNOSIS — Z85.118: ICD-10-CM

## 2019-01-29 DIAGNOSIS — I48.92: ICD-10-CM

## 2019-01-29 DIAGNOSIS — Z79.51: ICD-10-CM

## 2019-01-29 DIAGNOSIS — I48.0: ICD-10-CM

## 2019-01-29 DIAGNOSIS — I25.10: ICD-10-CM

## 2019-01-29 DIAGNOSIS — Z90.2: ICD-10-CM

## 2019-01-29 DIAGNOSIS — Z79.01: ICD-10-CM

## 2019-01-29 DIAGNOSIS — F17.210: ICD-10-CM

## 2019-01-29 DIAGNOSIS — Z98.1: ICD-10-CM

## 2019-01-29 DIAGNOSIS — J44.9: ICD-10-CM

## 2019-01-29 DIAGNOSIS — E03.9: ICD-10-CM

## 2019-01-29 DIAGNOSIS — I50.32: ICD-10-CM

## 2019-01-29 DIAGNOSIS — G89.4: ICD-10-CM

## 2019-01-29 DIAGNOSIS — M80.88XA: ICD-10-CM
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
## 2019-01-29 DIAGNOSIS — I11.0: ICD-10-CM

## 2019-01-29 DIAGNOSIS — E78.5: ICD-10-CM

## 2019-01-29 DIAGNOSIS — R06.00: ICD-10-CM

## 2019-01-29 DIAGNOSIS — I25.2: ICD-10-CM

## 2019-01-29 DIAGNOSIS — R07.89: Primary | ICD-10-CM

## 2019-01-29 DIAGNOSIS — Z79.899: ICD-10-CM

## 2019-01-29 LAB
ALBUMIN SERPL BCG-MCNC: 3.6 G/DL (ref 3.4–4.8)
ALBUMIN SERPL BCG-MCNC: 4 G/DL (ref 3.4–4.8)
ALP SERPL-CCNC: 107 U/L (ref 40–150)
ALP SERPL-CCNC: 120 U/L (ref 40–150)
ALT SERPL W P-5'-P-CCNC: 14 U/L (ref 8–55)
ALT SERPL W P-5'-P-CCNC: 19 U/L (ref 8–55)
ANION GAP SERPL CALC-SCNC: 12 MMOL/L (ref 10–20)
ANION GAP SERPL CALC-SCNC: 17 MMOL/L (ref 10–20)
AST SERPL-CCNC: 20 U/L (ref 5–34)
AST SERPL-CCNC: 27 U/L (ref 5–34)
BASOPHILS # BLD AUTO: 0 THOU/UL (ref 0–0.2)
BASOPHILS # BLD AUTO: 0.1 THOU/UL (ref 0–0.2)
BASOPHILS NFR BLD AUTO: 0.5 % (ref 0–1)
BASOPHILS NFR BLD AUTO: 1.5 % (ref 0–1)
BILIRUB SERPL-MCNC: 0.4 MG/DL (ref 0.2–1.2)
BILIRUB SERPL-MCNC: 0.6 MG/DL (ref 0.2–1.2)
BUN SERPL-MCNC: 14 MG/DL (ref 9.8–20.1)
BUN SERPL-MCNC: 16 MG/DL (ref 8.4–25.7)
CALCIUM SERPL-MCNC: 9.7 MG/DL (ref 7.8–10.44)
CALCIUM SERPL-MCNC: 9.7 MG/DL (ref 7.8–10.44)
CHD RISK SERPL-RTO: 2.7 (ref ?–4.5)
CHLORIDE SERPL-SCNC: 100 MMOL/L (ref 98–107)
CHLORIDE SERPL-SCNC: 99 MMOL/L (ref 98–107)
CHOLEST SERPL-MCNC: 155 MG/DL
CO2 SERPL-SCNC: 27 MMOL/L (ref 23–31)
CO2 SERPL-SCNC: 31 MMOL/L (ref 23–31)
CREAT CL PREDICTED SERPL C-G-VRATE: 0 ML/MIN (ref 70–130)
CREAT CL PREDICTED SERPL C-G-VRATE: 0 ML/MIN (ref 70–130)
EOSINOPHIL # BLD AUTO: 0.3 THOU/UL (ref 0–0.7)
EOSINOPHIL # BLD AUTO: 0.5 THOU/UL (ref 0–0.7)
EOSINOPHIL NFR BLD AUTO: 3.3 % (ref 0–10)
EOSINOPHIL NFR BLD AUTO: 5 % (ref 0–10)
GLOBULIN SER CALC-MCNC: 2.6 G/DL (ref 2.4–3.5)
GLOBULIN SER CALC-MCNC: 2.7 G/DL (ref 2.4–3.5)
GLUCOSE SERPL-MCNC: 117 MG/DL (ref 83–110)
GLUCOSE SERPL-MCNC: 91 MG/DL (ref 83–110)
HDLC SERPL-MCNC: 58 MG/DL
HGB BLD-MCNC: 12.8 G/DL (ref 12–16)
HGB BLD-MCNC: 13.9 G/DL (ref 14–18)
LDLC SERPL CALC-MCNC: 86 MG/DL
LYMPHOCYTES # BLD AUTO: 2.8 THOU/UL (ref 1.2–3.4)
LYMPHOCYTES # BLD: 2.4 THOU/UL (ref 1.2–3.4)
LYMPHOCYTES NFR BLD AUTO: 26.6 % (ref 21–51)
LYMPHOCYTES NFR BLD AUTO: 29.7 % (ref 21–51)
MCH RBC QN AUTO: 30.8 PG (ref 27–31)
MCH RBC QN AUTO: 30.9 PG (ref 27–31)
MCV RBC AUTO: 92.1 FL (ref 78–98)
MCV RBC AUTO: 95 FL (ref 78–98)
MONOCYTES # BLD AUTO: 0.6 THOU/UL (ref 0.11–0.59)
MONOCYTES # BLD AUTO: 0.6 THOU/UL (ref 0.11–0.59)
MONOCYTES NFR BLD AUTO: 6.1 % (ref 0–10)
MONOCYTES NFR BLD AUTO: 6.6 % (ref 0–10)
NEUTROPHILS # BLD AUTO: 5.4 THOU/UL (ref 1.4–6.5)
NEUTROPHILS # BLD AUTO: 5.7 THOU/UL (ref 1.4–6.5)
NEUTROPHILS NFR BLD AUTO: 57.3 % (ref 42–75)
NEUTROPHILS NFR BLD AUTO: 63.5 % (ref 42–75)
PLATELET # BLD AUTO: 274 THOU/UL (ref 130–400)
PLATELET # BLD AUTO: 279 THOU/UL (ref 130–400)
POTASSIUM SERPL-SCNC: 3.4 MMOL/L (ref 3.5–5.1)
POTASSIUM SERPL-SCNC: 3.9 MMOL/L (ref 3.5–5.1)
RBC # BLD AUTO: 4.16 MILL/UL (ref 4.2–5.4)
RBC # BLD AUTO: 4.5 MILL/UL (ref 4.7–6.1)
SODIUM SERPL-SCNC: 139 MMOL/L (ref 136–145)
SODIUM SERPL-SCNC: 140 MMOL/L (ref 136–145)
TRIGL SERPL-MCNC: 55 MG/DL (ref ?–150)
WBC # BLD AUTO: 9 THOU/UL (ref 4.8–10.8)
WBC # BLD AUTO: 9.5 THOU/UL (ref 4.8–10.8)

## 2019-01-29 PROCEDURE — G0378 HOSPITAL OBSERVATION PER HR: HCPCS

## 2019-01-29 PROCEDURE — 36415 COLL VENOUS BLD VENIPUNCTURE: CPT

## 2019-01-29 PROCEDURE — 99285 EMERGENCY DEPT VISIT HI MDM: CPT

## 2019-01-29 PROCEDURE — 85025 COMPLETE CBC W/AUTO DIFF WBC: CPT

## 2019-01-29 PROCEDURE — 93005 ELECTROCARDIOGRAM TRACING: CPT

## 2019-01-29 PROCEDURE — 85018 HEMOGLOBIN: CPT

## 2019-01-29 PROCEDURE — 94640 AIRWAY INHALATION TREATMENT: CPT

## 2019-01-29 PROCEDURE — 84484 ASSAY OF TROPONIN QUANT: CPT

## 2019-01-29 PROCEDURE — 82565 ASSAY OF CREATININE: CPT

## 2019-01-29 PROCEDURE — 97139 UNLISTED THERAPEUTIC PX: CPT

## 2019-01-29 PROCEDURE — 96374 THER/PROPH/DIAG INJ IV PUSH: CPT

## 2019-01-29 PROCEDURE — 84443 ASSAY THYROID STIM HORMONE: CPT

## 2019-01-29 PROCEDURE — 80061 LIPID PANEL: CPT

## 2019-01-29 PROCEDURE — 93017 CV STRESS TEST TRACING ONLY: CPT

## 2019-01-29 PROCEDURE — 78452 HT MUSCLE IMAGE SPECT MULT: CPT

## 2019-01-29 PROCEDURE — 80053 COMPREHEN METABOLIC PANEL: CPT

## 2019-01-29 PROCEDURE — A9500 TC99M SESTAMIBI: HCPCS

## 2019-01-29 PROCEDURE — 99406 BEHAV CHNG SMOKING 3-10 MIN: CPT

## 2019-01-29 PROCEDURE — 85049 AUTOMATED PLATELET COUNT: CPT

## 2019-01-29 PROCEDURE — 85014 HEMATOCRIT: CPT

## 2019-01-29 NOTE — NM
CARDIAC SPECT WITH EJECTION FRACTION AND WALL MOTION:

 

Date:  01/29/19 

 

HISTORY:  

Chest pain. History of congestive heart failure, atrial fibrillation, COPD, hypertension, and smoking
. 

 

TECHNIQUE:   

Lexiscan sestamibi study is performed. Patient was injected with 28.8 mCi technetium-99m sestamibi in
travenously for stress images and patient was injected with 9.0 mCi technetium-99m sestamibi intraven
ously for resting images. 

 

FINDINGS:

Multiple SPECT images in the short axis, vertical long axis, and horizontal long axis demonstrate min
imal decreased activity in the apex and inferior wall without evidence for ischemia. 

 

TID:  1.05

LHR:  0.28

EDV:  67 mL

EF:  81%

 

MYOCARDIAL PERFUSION WALL MOTION:

No significant focal wall motion abnormality. 

 

IMPRESSION: 

Minimal decreased activity on both stress and rest images in the inferior apex. No scan evidence for 
ischemia. 

 

 

POS: [facility]

## 2019-01-29 NOTE — HP
PRIMARY CARE PHYSICIAN:  Dorcas Howard MD



CARDIOLOGIST:  Amadou Romano MD.



HISTORY OF PRESENT ILLNESS:  Ms. Eid is a very pleasant 78-year-old female who

was transferred from [place] [facility] for chest pain.  The patient reports gradual

onset of tightness yesterday evening associated with worsening dyspnea.  Per

patient, she was hospitalized here few months ago and was told she had a heart

attack.  The patient denies history of stents or angioplasty.  She denies any chest

pain when transferred to [facility], but does report

chronic back pain.  She reports that she was scheduled for a procedure today to

repair a compression fracture in her thoracic spine, which was obviously canceled

when she reported to the emergency room for chest pain.  Of note, daughter reports

that pain specialist in [place] who was going to do the procedure reports that he

will not do it unless she gets clearance from Cardiology.  Dr. Romano saw her on

her visit in November.  She had an appointment to follow up with him, but fell and

injured her back and has been dealing with a compression fracture and has not

rescheduled that appointment.  It is also important to note that the patient has 2

medical record numbers in the system and most of her history is in the alternate

medical record number that will be need to merge with her current visit.  The

patient was discharged on 2018 with pneumonia, AFib, atrial flutter on Multaq,

chronic deconditioning, acute on chronic congestive diastolic heart failure,

suspected sepsis due to pneumonia, acute metabolic encephalopathy which had

resolved, acute hypoxic respiratory failure which had resolved, known coronary

artery disease, chronic pain syndrome, and hypertension.  The patient did see a pain

specialist in [place], who manages her chronic back pain.  The patient reports

that the pain she experienced within the last 24 hours was similar in nature to the

pain that brought her to the emergency room in November which caused her concern.

The patient's HeartScore was 5.  The patient was admitted to the observation unit

for further management.  The patient is a full code.  Some history was obtained from

her daughter who is her surrogate decision maker. 



PAST MEDICAL AND SURGICAL HISTORY:  Hypoxic respiratory failure, sepsis, metabolic

encephalopathy, atrial fibrillation, demand ischemia, urinary tract infection

requiring hospitalization in November.  The patient has history of hypertension,

hypothyroidism, osteoporosis, chronic back pain on high dose narcotics, history of

lung cancer with prior resection on the right side, history of lumbar fusion,

cholecystectomy, hernia repair, left hip replacement, and COPD. 



SOCIAL HISTORY:  The patient is a recent smoker.  Reports that she smoked about a

pack a day for 50 years.  She quit in 10/2018.  Denies any alcohol or drug use.

Lives alone and has assistance from her daughter, Ms. Branden Michael. 



FAMILY HISTORY:  Prior records indicate her mother  of lymphoma at the age of

80; father  at the age of 87, cause unknown. 



REVIEW OF SYSTEMS:  CONSTITUTIONAL:  The patient denies fever or chills. 

EYES:  Denies any changes to vision. 

ENT:  Denies any ears, nose, or throat pain. 

CARDIOVASCULAR:  Reports chest pain.  Denies palpitations. 

RESPIRATORY:  Reports cough, reports shortness of breath, reports green sputum. 

GASTROINTESTINAL:  Denies any abdominal pain, constipation, or diarrhea. 

MUSCULOSKELETAL:  Reports chronic back pain. 

SKIN:  Denies rash or skin changes. 

NEUROLOGIC:  Denies any focal sensory or motor deficits. 

ENDOCRINE:  Denies any changes. 



All other review of systems negative except mentioned in the HPI.



PHYSICAL EXAMINATION:

VITAL SIGNS:  Blood pressure 116/55, pulse is 62, respirations 12, pO2 sats are 93%

on O2 2 L.  The patient appears nontoxic, is alert and oriented to person, place,

and time, is in no apparent distress. 

HEAD:  Atraumatic and normocephalic. 

EYES:  Pupils are equal, round, and reactive to light.  Extraocular muscles are

intact. 

ENT:  Mouth exam is normal.  Mucous membranes are moist. 

NECK:  Normal range of motion.  Trachea is midline. 

RESPIRATORY/CHEST:  Breath sounds with diffuse expiratory wheezes.  Symmetric chest

movement. 

CARDIOVASCULAR:  Regular heart rate and rhythm.  Heart sounds are normal. 

ABDOMEN:  Nontender.  Bowel sounds are heard. 

BACK:  Normal inspection.  No CVA tenderness. 

UPPER EXTREMITIES:  Inspection normal, range of motion is normal. 

LOWER EXTREMITIES:  Inspection normal. Range of motion is normal.  Pedal pulses are

equal bilaterally.  No edema is noted. 

NEURO:  Speech is normal.  The patient is oriented to person, place, and time.  No

focal motor or sensory deficits. 

SKIN:  Warm, dry, normal in color.



IMAGING STUDIES:  EKG normal sinus rhythm, beats per minute 65, conduction normal,

ST segments normal, axis is normal, has a nonspecific T-wave abnormality. 



PERTINENT LABORATORY DATA:  White blood cell count is 9, hemoglobin 12.8, hematocrit

39.5, and platelet count 274.  Sodium 139, potassium 3.4, chloride 99, gap is 12,

BUN is 14, creatinine is 0.93, estimated GFR is 58.  Liver enzymes are unremarkable.

 Troponin x3 are undetectable.  Triglycerides are 55, cholesterol 155, LDL is 86,

HDL is 58, 3rd generation TSH is 2.57. 



IMPRESSION AND PLAN:  

1. Chest pain.  We will trend troponins, order a stress test.  The patient had an

echocardiogram in November per previous hospital visit records.  We will ask

Cardiology to consult based on significant cardiology history. 

2. History of atrial fibrillation.  We will continue home medications, Eliquis and

Multaq. 

3. Hyperlipidemia.  We will continue home medication.

4. Hypothyroidism.  We will continue home medication.

5. Chronic back pain.  We will continue home medication.

6. Hypertension.  We will continue home medication.  We will trend vital signs.  We

will add p.r.n. medications as needed. 

7. Deep venous thrombosis prophylaxis is needed based on the patient being on

Eliquis. 

8. We will add gastrointestinal prophylaxis as needed.

9. Hospital course will be dependent on clinical findings.







Job ID:  [id number]

## 2019-01-29 NOTE — RAD
PORTABLE CHEST:

 

Date:  01/29/19

 

HISTORY:  

Chest pain and shortness of breath. 

 

COMPARISON:  

11/17/18. 

 

FINDINGS:

Heart size is within normal limits. There are atherosclerotic changes of the aorta. Increased interst
itial markings appear largely chronic in nature. It would be difficult to exclude some element of navjot
ma, but these changes are fairly similar to the previous exam. 

 

IMPRESSION: 

Chronic appearing interstitial lung change. 

 

 

POS: [facility]

## 2019-01-30 VITALS — SYSTOLIC BLOOD PRESSURE: 170 MMHG | TEMPERATURE: 99 F | DIASTOLIC BLOOD PRESSURE: 76 MMHG

## 2019-01-30 LAB
ALBUMIN SERPL BCG-MCNC: 3.4 G/DL (ref 3.4–4.8)
ALP SERPL-CCNC: 101 U/L (ref 40–150)
ALT SERPL W P-5'-P-CCNC: 12 U/L (ref 8–55)
ANION GAP SERPL CALC-SCNC: 13 MMOL/L (ref 10–20)
AST SERPL-CCNC: 19 U/L (ref 5–34)
BASOPHILS # BLD AUTO: 0 THOU/UL (ref 0–0.2)
BASOPHILS NFR BLD AUTO: 0.5 % (ref 0–1)
BILIRUB SERPL-MCNC: 0.2 MG/DL (ref 0.2–1.2)
BUN SERPL-MCNC: 14 MG/DL (ref 9.8–20.1)
CALCIUM SERPL-MCNC: 9.4 MG/DL (ref 7.8–10.44)
CHLORIDE SERPL-SCNC: 99 MMOL/L (ref 98–107)
CO2 SERPL-SCNC: 31 MMOL/L (ref 23–31)
CREAT CL PREDICTED SERPL C-G-VRATE: 46 ML/MIN (ref 70–130)
CREAT CL PREDICTED SERPL C-G-VRATE: 54 ML/MIN (ref 70–130)
EOSINOPHIL # BLD AUTO: 0.4 THOU/UL (ref 0–0.7)
EOSINOPHIL NFR BLD AUTO: 4.6 % (ref 0–10)
GLOBULIN SER CALC-MCNC: 2.4 G/DL (ref 2.4–3.5)
GLUCOSE SERPL-MCNC: 102 MG/DL (ref 83–110)
HGB BLD-MCNC: 12.6 G/DL (ref 12–16)
HGB BLD-MCNC: 13.7 G/DL (ref 12–16)
LYMPHOCYTES # BLD: 2.5 THOU/UL (ref 1.2–3.4)
LYMPHOCYTES NFR BLD AUTO: 27.7 % (ref 21–51)
MCH RBC QN AUTO: 31.2 PG (ref 27–31)
MCV RBC AUTO: 95.7 FL (ref 78–98)
MONOCYTES # BLD AUTO: 0.6 THOU/UL (ref 0.11–0.59)
MONOCYTES NFR BLD AUTO: 7.1 % (ref 0–10)
NEUTROPHILS # BLD AUTO: 5.4 THOU/UL (ref 1.4–6.5)
NEUTROPHILS NFR BLD AUTO: 60.1 % (ref 42–75)
PLATELET # BLD AUTO: 251 THOU/UL (ref 130–400)
PLATELET # BLD AUTO: 254 THOU/UL (ref 130–400)
POTASSIUM SERPL-SCNC: 3.9 MMOL/L (ref 3.5–5.1)
RBC # BLD AUTO: 4.02 MILL/UL (ref 4.2–5.4)
SODIUM SERPL-SCNC: 139 MMOL/L (ref 136–145)
WBC # BLD AUTO: 9 THOU/UL (ref 4.8–10.8)

## 2019-01-30 NOTE — CON
DATE OF CONSULTATION:  



HISTORY OF PRESENT ILLNESS:  The patient is a very pleasant 78-year-old woman, 
who presents with recurrent chest discomfort.  The patient recently was in the 
hospital

with atrial fibrillation.  She suffered a small non-Q-wave myocardial 
infarction. She underwent a cardiac catheterization.  She was found to have 
normal left

ventricular systolic function with mild coronary artery disease.  She had two 
sequential 20% lesions in the right coronary artery.  The patient was in her 
usual

state of health when she had been in an argument and developed left-sided chest 
discomfort.  She presented to the emergency room for further evaluation.  The

patient denies having any present chest discomfort. 



PAST MEDICAL HISTORY: 

1. Atrial fibrillation.

2. Hypertension.

3. Hypothyroidism.

4. Lung carcinoma.

5. History of carcinoid tumor.



PAST SURGICAL HISTORY:  Hernia surgery and hip replacement.



SOCIAL HISTORY:  Long history of tobacco abuse.



FAMILY HISTORY:  Positive family history of heart disease.



PHYSICAL EXAMINATION:

GENERAL:  Thin woman, in no acute distress. 

VITAL SIGNS:  Blood pressure was 176/80. 

NECK:  Showed no jugular venous distention. 

LUNGS:  Diminished breath sounds bilateral. 

HEART:  Regular rate and rhythm.  Normal S1 and S2. 

ABDOMEN:  Nondistended. 

EXTREMITIES:  Show trace edema.



LABORATORY DATA:  White blood cell count 9.0, hemoglobin 12.6, hematocrit 38.5, 
and

platelets are 251.  Sodium 139, potassium 3.9, chloride 99, bicarbonate 31, BUN 
14,

creatinine is 0.9, glucose is 102.  EKG revealed normal sinus rhythm, otherwise

normal ECG. 



IMPRESSION:  

1. Chest pain suggestive of angina.

2. History of mild coronary artery disease.

3. History of paroxysmal atrial fibrillation.

4. Chronic obstructive pulmonary disease.

5. History of carcinoid tumor.

6. Tobacco abuse.  



This patient presented with angina.  Cardiac enzymes are unremarkable.  EKG 
showed no evidence of ischemia.  From a cardiac standpoint, she should be  on 
lipid-lower medication.  She should also have nitroglycerin tablets.  We will 
follow this patient with you through her hospitalization .









Job ID:  [id number]



[facility]

## 2019-02-07 ENCOUNTER — HOSPITAL ENCOUNTER (OUTPATIENT)
Dept: HOSPITAL 9 - MADLABBHPM | Age: 79
Discharge: HOME | End: 2019-02-07
Attending: FAMILY MEDICINE
Payer: MEDICARE

## 2019-02-07 DIAGNOSIS — K52.9: ICD-10-CM

## 2019-02-07 DIAGNOSIS — M81.0: ICD-10-CM

## 2019-02-07 DIAGNOSIS — R07.81: Primary | ICD-10-CM

## 2019-02-07 PROCEDURE — 36415 COLL VENOUS BLD VENIPUNCTURE: CPT

## 2019-02-07 PROCEDURE — 87040 BLOOD CULTURE FOR BACTERIA: CPT

## 2019-02-07 NOTE — RAD
LEFT RIBS THREE VIEWS:

2/7/19

 

HISTORY: 

Left chest wall injury.

 

FINDINGS:  

No displaced fracture or pneumothorax are apparent. Exam is somewhat limited due to degree of osteopo
rosis and difficulty in positioning by the patient. Multilevel vertebroplasty cement of the thoracic 
spine is apparent with marked compression of a mid thoracic vertebral body between two of the vertebr
oplasty levels. 

 

IMPRESSION:  

Osteoporosis. No displaced rib fracture is evident. 

 

POS: [facility]

## 2019-04-22 ENCOUNTER — HOSPITAL ENCOUNTER (EMERGENCY)
Dept: HOSPITAL 9 - MADERS | Age: 79
LOS: 1 days | Discharge: ANOTHER HOSPITAL | End: 2019-04-23
Payer: MEDICARE

## 2019-04-22 DIAGNOSIS — F17.210: ICD-10-CM

## 2019-04-22 DIAGNOSIS — J44.9: ICD-10-CM

## 2019-04-22 DIAGNOSIS — R09.02: ICD-10-CM

## 2019-04-22 DIAGNOSIS — I48.91: ICD-10-CM

## 2019-04-22 DIAGNOSIS — I25.2: ICD-10-CM

## 2019-04-22 DIAGNOSIS — I20.0: Primary | ICD-10-CM

## 2019-04-22 DIAGNOSIS — E03.9: ICD-10-CM

## 2019-04-22 DIAGNOSIS — I10: ICD-10-CM

## 2019-04-22 DIAGNOSIS — E87.70: ICD-10-CM

## 2019-04-22 LAB
ALBUMIN SERPL BCG-MCNC: 4 G/DL (ref 3.4–4.8)
ALP SERPL-CCNC: 102 U/L (ref 40–150)
ALT SERPL W P-5'-P-CCNC: 16 U/L (ref 8–55)
ANION GAP SERPL CALC-SCNC: 14 MMOL/L (ref 10–20)
AST SERPL-CCNC: 20 U/L (ref 5–34)
BASOPHILS # BLD AUTO: 0.1 THOU/UL (ref 0–0.2)
BASOPHILS NFR BLD AUTO: 1.1 % (ref 0–1)
BILIRUB SERPL-MCNC: 0.3 MG/DL (ref 0.2–1.2)
BUN SERPL-MCNC: 15 MG/DL (ref 9.8–20.1)
CALCIUM SERPL-MCNC: 9.7 MG/DL (ref 7.8–10.44)
CHLORIDE SERPL-SCNC: 95 MMOL/L (ref 98–107)
CO2 SERPL-SCNC: 35 MMOL/L (ref 23–31)
CREAT CL PREDICTED SERPL C-G-VRATE: 0 ML/MIN (ref 70–130)
EOSINOPHIL # BLD AUTO: 0.4 THOU/UL (ref 0–0.7)
EOSINOPHIL NFR BLD AUTO: 3.2 % (ref 0–10)
GLOBULIN SER CALC-MCNC: 2.6 G/DL (ref 2.4–3.5)
GLUCOSE SERPL-MCNC: 109 MG/DL (ref 83–110)
HGB BLD-MCNC: 13.3 G/DL (ref 12–16)
LYMPHOCYTES # BLD AUTO: 2.6 THOU/UL (ref 1.2–3.4)
LYMPHOCYTES NFR BLD AUTO: 21.6 % (ref 21–51)
MCH RBC QN AUTO: 28.4 PG (ref 27–31)
MCV RBC AUTO: 90.4 FL (ref 78–98)
MONOCYTES # BLD AUTO: 0.8 THOU/UL (ref 0.11–0.59)
MONOCYTES NFR BLD AUTO: 6.8 % (ref 0–10)
NEUTROPHILS # BLD AUTO: 8 THOU/UL (ref 1.4–6.5)
NEUTROPHILS NFR BLD AUTO: 67.3 % (ref 42–75)
PLATELET # BLD AUTO: 300 THOU/UL (ref 130–400)
POTASSIUM SERPL-SCNC: 4 MMOL/L (ref 3.5–5.1)
RBC # BLD AUTO: 4.7 MILL/UL (ref 4.2–5.4)
SODIUM SERPL-SCNC: 140 MMOL/L (ref 136–145)
WBC # BLD AUTO: 11.8 THOU/UL (ref 4.8–10.8)

## 2019-04-22 PROCEDURE — 96375 TX/PRO/DX INJ NEW DRUG ADDON: CPT

## 2019-04-22 PROCEDURE — 71046 X-RAY EXAM CHEST 2 VIEWS: CPT

## 2019-04-22 PROCEDURE — 80053 COMPREHEN METABOLIC PANEL: CPT

## 2019-04-22 PROCEDURE — 99292 CRITICAL CARE ADDL 30 MIN: CPT

## 2019-04-22 PROCEDURE — 93005 ELECTROCARDIOGRAM TRACING: CPT

## 2019-04-22 PROCEDURE — 83880 ASSAY OF NATRIURETIC PEPTIDE: CPT

## 2019-04-22 PROCEDURE — 85025 COMPLETE CBC W/AUTO DIFF WBC: CPT

## 2019-04-22 PROCEDURE — 96374 THER/PROPH/DIAG INJ IV PUSH: CPT

## 2019-04-22 PROCEDURE — 94760 N-INVAS EAR/PLS OXIMETRY 1: CPT

## 2019-04-22 PROCEDURE — 71045 X-RAY EXAM CHEST 1 VIEW: CPT

## 2019-04-22 PROCEDURE — 84484 ASSAY OF TROPONIN QUANT: CPT

## 2019-04-22 NOTE — RAD
2 views of the chest:

4/22/2019



COMPARISON: Portable chest x-ray performed 4/22/2019



HISTORY: Chest pain



FINDINGS: Mild diffuse nonspecific interstitial prominence noted. No pneumothorax or pleural fluid. N
o focal consolidation or alveolar edema. Cardiac silhouette appears mildly enlarged on the lateral

view. Multilevel kyphoplasty changes are present.



IMPRESSION: No acute findings.



Reported By: Mio Bustillo 

Electronically Signed:  4/22/2019 11:51 PM

## 2019-04-22 NOTE — RAD
Frontal radiograph chest:

4/22/2019



COMPARISON: 1/29/2019 and 2/7/2019



HISTORY: Chest pain



FINDINGS: Multilevel kyphoplasty changes are noted. There is mild nonspecific perihilar interstitial 
prominence and pulmonary vascular congestion with no pneumothorax, lobar consolidation, or alveolar

edema. There is a subtle focal area of increased density in the left lung base laterally, likely on t
he basis of overlapping osseous structures as this region is unremarkable on recent radiograph

performed 2/7/2019



IMPRESSION: Nonspecific interstitial prominence with no focal consolidation or alveolar edema. If sym
ptoms persist, 2 view examination of the chest is advised.



Reported By: Mio Bustillo 

Electronically Signed:  4/22/2019 11:20 PM

## 2019-04-23 ENCOUNTER — HOSPITAL ENCOUNTER (OUTPATIENT)
Dept: HOSPITAL 92 - ERS | Age: 79
Setting detail: OBSERVATION
LOS: 1 days | Discharge: HOME | End: 2019-04-24
Attending: FAMILY MEDICINE | Admitting: FAMILY MEDICINE
Payer: MEDICARE

## 2019-04-23 VITALS — BODY MASS INDEX: 25.3 KG/M2

## 2019-04-23 DIAGNOSIS — G89.4: ICD-10-CM

## 2019-04-23 DIAGNOSIS — I48.0: ICD-10-CM

## 2019-04-23 DIAGNOSIS — Z79.891: ICD-10-CM

## 2019-04-23 DIAGNOSIS — I48.92: ICD-10-CM

## 2019-04-23 DIAGNOSIS — J44.1: Primary | ICD-10-CM

## 2019-04-23 DIAGNOSIS — I25.2: ICD-10-CM

## 2019-04-23 DIAGNOSIS — Z99.81: ICD-10-CM

## 2019-04-23 DIAGNOSIS — F17.210: ICD-10-CM

## 2019-04-23 DIAGNOSIS — I25.10: ICD-10-CM

## 2019-04-23 DIAGNOSIS — E03.9: ICD-10-CM

## 2019-04-23 DIAGNOSIS — Z79.01: ICD-10-CM

## 2019-04-23 DIAGNOSIS — Z79.899: ICD-10-CM

## 2019-04-23 DIAGNOSIS — E87.70: ICD-10-CM

## 2019-04-23 DIAGNOSIS — Z88.8: ICD-10-CM

## 2019-04-23 DIAGNOSIS — J96.01: ICD-10-CM

## 2019-04-23 LAB
ANALYZER IN CARDIO: (no result)
BASE EXCESS STD BLDA CALC-SCNC: 9.1 MEQ/L
CA-I BLDA-SCNC: 1.18 MMOL/L (ref 1.12–1.3)
CRYSTAL-AUWI FLAG: 0 (ref 0–15)
HCO3 BLDA-SCNC: 34.1 MEQ/L (ref 22–28)
HCT VFR BLDA CALC: 44 % (ref 36–47)
HEV IGM SER QL: 0.2 (ref 0–7.99)
HGB BLDA-MCNC: 14.9 G/DL (ref 12–16)
HYALINE CASTS #/AREA URNS LPF: (no result) LPF
O2 A-A PPRESDIFF RESPIRATORY: 42.51 MM[HG] (ref 0–20)
PATHC CAST-AUWI FLAG: 0 (ref 0–2.49)
PCO2 BLDA: 47.3 MMHG (ref 35–45)
PH BLDA: 7.48 [PH] (ref 7.35–7.45)
PO2 BLDA: 48.1 MMHG (ref 70–?)
POTASSIUM BLD-SCNC: 3.65 MMOL/L (ref 3.7–5.3)
RBC UR QL AUTO: (no result) HPF (ref 0–3)
SP GR UR STRIP: 1.01 (ref 1–1.04)
SPECIMEN DRAWN FROM PATIENT: (no result)
SPERM-AUWI FLAG: 0 (ref 0–9.9)
TROPONIN I SERPL DL<=0.01 NG/ML-MCNC: (no result) NG/ML (ref ?–0.03)
TROPONIN I SERPL DL<=0.01 NG/ML-MCNC: 0.01 NG/ML (ref ?–0.03)
YEAST-AUWI FLAG: 0 (ref 0–25)

## 2019-04-23 PROCEDURE — 84484 ASSAY OF TROPONIN QUANT: CPT

## 2019-04-23 PROCEDURE — 99285 EMERGENCY DEPT VISIT HI MDM: CPT

## 2019-04-23 PROCEDURE — 36415 COLL VENOUS BLD VENIPUNCTURE: CPT

## 2019-04-23 PROCEDURE — 93005 ELECTROCARDIOGRAM TRACING: CPT

## 2019-04-23 PROCEDURE — 80048 BASIC METABOLIC PNL TOTAL CA: CPT

## 2019-04-23 PROCEDURE — 85025 COMPLETE CBC W/AUTO DIFF WBC: CPT

## 2019-04-23 PROCEDURE — 81001 URINALYSIS AUTO W/SCOPE: CPT

## 2019-04-23 PROCEDURE — 82805 BLOOD GASES W/O2 SATURATION: CPT

## 2019-04-23 PROCEDURE — 97116 GAIT TRAINING THERAPY: CPT

## 2019-04-23 PROCEDURE — 94640 AIRWAY INHALATION TREATMENT: CPT

## 2019-04-23 PROCEDURE — G0378 HOSPITAL OBSERVATION PER HR: HCPCS

## 2019-04-23 PROCEDURE — 84145 PROCALCITONIN (PCT): CPT

## 2019-04-23 NOTE — PDOC.FPRHP
- History of Present Illness


Chief Complaint: feeling wierd


History of Present Illness: 


79yo F with pmh of COPD presents as transfer from outside ER for CP. However, 

when pt was evaluated [facility] she adamently denied ever having had chest pain or 

any discomfort. She just reports "feeling wierd" and having some SOB for 2 days 

with increased sputum (with green coloration). No fevers or chills, no LE edema.





ED Course: 





hydralazine, gabapentin, morphine, lasix 20mg IV, ASA, nitro





- Allergies/Adverse Reactions


 Allergies











Allergy/AdvReac Type Severity Reaction Status Date / Time


 


hydralazine Allergy   Verified 04/19/19 13:50


 


No Known Drug Allergies Allergy   Verified 04/19/19 13:50














- Home Medications


 











 Medication  Instructions  Recorded  Confirmed  Type


 


Sennosides/Docusate Sodium 2 tab PO BID PRN  tab 11/06/18 11/22/18 Rx





[Senokot S]    


 


Acetaminophen [Tylenol Regular 650 mg PO Q4H PRN  tab 12/12/18  Rx





Strength]    


 


Apixaban [Eliquis] 5 mg PO BID  tab 12/12/18  Rx


 


Aspirin [Ecotrin Low Strength] 81 mg PO DAILY  tab 12/12/18  Rx


 


Atorvastatin Calcium 20 mg PO HS #30 tablet 12/12/18  Rx


 


Bisacodyl [Dulcolax] 10 mg PO DAILYPRN PRN  tab 12/12/18  Rx


 


Clotrimazole [Lotrimin 1% Cream] 0 gm TOP BID  tube 12/12/18  Rx


 


Diltiazem CD [Cardizem CD] 120 mg PO DAILY  cap 12/12/18 04/23/19 Rx


 


Dronedarone HCl [Multaq] 400 mg PO BID-WM  tab 12/12/18  Rx


 


Famotidine [Pepcid] 20 mg PO BID #60 tab 12/12/18  Rx


 


Furosemide [Lasix] 20 mg PO 0900,1400  tab 12/12/18  Rx


 


Ipratropium/Albuterol Sulfate 3 ml NEB QID PRN  neb 12/12/18  Rx





[DuoNeb]    


 


Lantiseptic 0 gm TOP PRN PRN  jar 12/12/18  Rx


 


Levothyroxine Sodium [Synthroid] 50 mcg PO 0600  tab 12/12/18 04/23/19 Rx


 


Mag Hydrox/Al Hydrox/Simeth 30 ml PO Q6H PRN #120 udcup 12/12/18  Rx





[Maalox Plus]    


 


Morphine ER [MS Contin] 60 mg PO 0600,1500,2100  tab 12/12/18 04/23/19 Rx


 


Nicotine [Nicoderm CQ] 7 mg TD DAILY #30 patch 12/12/18  Rx


 


Polyethylene Glycol 3350 [Miralax] 17 gm PER TUBE DAILY #1 bot 12/12/18  Rx


 


Sertraline HCl [Zoloft] 50 mg PO DAILY  tab 12/12/18  Rx


 


traZODone HCl [Desyrel] 50 mg PO HS PRN #15 tab 12/12/18  Rx


 


Acetaminophen/Diphenhydramine 1 tablet PO TID 01/30/19 01/30/19 History





[Percogesic 325-12.5 mg Tablet]    


 


Albuterol Sulfate [Albuterol 2.5 mg NEB Q4H PRN 01/30/19 04/23/19 History





Sulfate Neb]    


 


Albuterol Sulfate [Proair HFA] 2 puff INH Q6HR PRN 01/30/19 01/30/19 History


 


Apixaban [Eliquis] 5 mg PO BID 01/30/19 04/23/19 History


 


Budesonide-Formoterol [Symbicort 2 puff INH BID 01/30/19 04/23/19 History





160-4.5]    


 


Diltiazem HCl [Cardizem] 1 tab PO DAILY 01/30/19 01/30/19 History


 


Dronedarone HCl [Multaq] 400 mg PO BID-WM 01/30/19 04/23/19 History


 


Fluticasone Propionate [Flonase 1 spray EA NARE DAILY 01/30/19 04/23/19 History





Allergy Relief]    


 


Gabapentin 600 mg PO QID 01/30/19 04/23/19 History


 


HYDROcodone/Acetaminophen [Norco 1 tab PO QID PRN 01/30/19 04/23/19 History





 Tablet]    


 


Hydrochlorothiazide 25 mg PO DAILY 01/30/19 04/23/19 History


 


Morphine Sulfate [Morphabond ER] 60 mg PO Q8H 01/30/19 01/30/19 History


 


Nitroglycerin [Nitrostat] 0.4 mg SL Q5MIN #1 bot 01/30/19  Rx


 


Pravastatin Sodium [Pravachol] 20 mg PO HS #30 tab 01/30/19  Rx














- History


PMHx: mild CAD-cath in 11/2018 reveals 20% stenosis in RCA, Paroxysmal a-fib/

flutter, COPD, hypothyroidism, chronic back pain on high dose opioids


 


PSHx: back surgeries, right middle lobe resection 2/2 lung cancer?, lumbar 

fusion, cholecystectomy, left hip replacement, appendectomy, hysterectomy





FHx: Father: MI 


 


Social: Smokes 5-10 cig per day for the last few month, previously 1 pack a day 

for 50 years, no alcohol or drugs 


 








- Review of Systems


General: denies: fever/chills, weight/appetite/sleep changes


ENT: denies: nasal congestion, rhinorrhea


Respiratory: reports: cough (productive green sputum), shortness of breath


Cardiovascular: denies: chest pain, paroxysmal nocturnal dyspnea, orthopnea


Gastrointestinal: denies: nausea, vomiting, constipation, abdominal pain


Genitourinary: denies: dysuria, discharge


Skin: denies: rashes, lesions


Musculoskeletal: reports: pain (back pain)


Neurological: denies: numbness, syncope, seizure





- Vital signs











 BP: 152/68, Pulse: 68, Resp: 16, Temp: 98.2 (Oral), Pain: 6, O2 sat: 96 on 3L 

Oxygen, Time: 4/23/2019 02:35








weight: 58kg








- Physical Exam


Constitutional: awake, alert and oriented


HEENT: EOMI, grossly normal vision, grossly normal hearing


Neck: supple, trachea midline


Chest: no-tender to palpation


Heart: RRR, normal S1/S2, other (+1 pitting edema in bilateral ankles)


Lungs: no respiratory distress, other (expiratory wheezing in bilateral lungs)


Abdomen: soft, non-tender


Musculoskeletal: normal structure, normal tone


Neurological: no focal deficit, normal sensation


Skin: no rash/lesions, good turgor


Heme/Lymphatic: no unusual bruising or bleeding, no purpura


Psychiatric: normal mood and affect, good judgment and insight





FMR H&P: A/P





- Problem List


(1) COPD exacerbation


Current Visit: Yes   Status: Acute   Code(s): J44.1 - CHRONIC OBSTRUCTIVE 

PULMONARY DISEASE W (ACUTE) EXACERBATION   





(2) Afib


Current Visit: Yes   Status: Acute   Code(s): I48.91 - UNSPECIFIED ATRIAL 

FIBRILLATION   





(3) Hypertension


Current Visit: No   Status: Chronic   Code(s): I10 - ESSENTIAL (PRIMARY) 

HYPERTENSION   


Qualifiers: 


   Hypertension type: essential hypertension   Qualified Code(s): I10 - 

Essential (primary) hypertension   


Comment: controlled   





- Plan


COPD exacerbation


A- exam consisitent with COPD exacerbation, pt is now requiring up to 3L of O2 

where as previously she was only requiring O2 at night. Increased sputum but 

afebrile with unimpressive WBC at 11.8


P- prednisone


-duonebs


-procal


-hold ABX for now until resulted procal 





mild volume overload


A- no hx of CHF, echo from 10/2018 does show EF of 60-65%. Exam consistent with 

some mild volume overload. Pt is s/p one dose of lasix 20mg IV


P- will hold future lasix and fluid restrict with strict I/Os





COPD


-home meds





afib


-home meds





HTN


-home meds





hypothyroidism


-home meds





CODE: FULL








FMR H&P: Upper Level





- Pertinent history


78 yr old female with PMH of mild CAD, COPD, 


Pt felt weird. Feels as though she may be coming down with something. She 

reports increased sputum production over the last couple days. Reports 

increased coughing. No fever. Has required increased oxygen use over the last 2 

days. Usually only requires it at night. 


Patient adamantly denies chest pain. 





- Pertinent findings





Gen: patient is very sleepy(it is 0400) but easily arrousable 


Lungs: Diffuse wheezes in all lungs fields, bilateral lower lobe crackles, 

moving air, no resp distress  


Neuro: no focal neurologic deficits 


Msk: severe kyphosis 





CXR (2 view): mild diffuse interstitial prominence, no focal consolidation. 


EKG: NSR, no ST changes QTc 476





- Plan


Date/Time: 04/23/19 0324








I, [Bharti Farfan], have evaluated this patient and agree with findings/plan as 

outlined by intern resident. Pertinent changes/additions are listed here.








78 yr old female with increased SOB, increasing O2 requirement, increase cough 

and productive sputum





COPD exacerbation


-patient afebrile, no tachycardia, mild WBC, no tachypnea(no sepsis/sirs 

critera met)


-will obtain procal and add antibiotics if indicated 


-oral prednisone 


-scheduled duonebs 


-wean O2 as tolerated 





Chest pain


-reported in ER notes although patient adamantly denies 


-trop neg x 2


-neg stress in 1/2019


-cath with only 20% stenosis in RCA in 10/2018 





Chronic back pain 


-restart home pain meds 





hypothyroidism


-restart home med





hx of a fib/flutter


-cont multaq


-cont eliquis





PCP: Dr. Alston


Code: FULL 


Dispo: likely in 2 midnights 


DVT ppx: eliquis


Diet: HH

## 2019-04-23 NOTE — HP
I have examined the patient.  I have discussed the case with Dr. Huseyin Shaw and

agree with his assessment and plan. 



Briefly, Ms. Eid is a 78-year-old white female with a long history of COPD.

She presented to an [facility] ER with increased shortness of breath and some chest

discomfort.  She has a long history of COPD and requires oxygen at home at times. 



PHYSICAL EXAMINATION:

GENERAL:  On exam, when I examined her, she was drowsy but easily arousable. 

VITAL SIGNS:  Her blood pressure is 150/60, her pulse rate was 70 and regular,

respirations were 16.  She is afebrile.  Her O2 saturation on 3 L was 96%. 

EAR, NOSE, AND THROAT:  No evidence of erythema of the throat. 

NECK:  Supple. 

CARDIAC:  Heart rhythm was regular.  S4 apical gallop. 

LUNGS:  There are a few expiratory wheezes bilaterally.  She was not using accessory

muscles of respiration. 

ABDOMEN:  Flat and soft.  No guarding, rebound, or rigidity. 

EXTREMITIES:  Trace edema. 

NEUROLOGICAL:  No focal deficits.



LABORATORY DATA:  Her room air arterial blood gas showed pH is 7.48, pCO2 of 47.3,

pO2 of 48.1 with an O2 saturation of 86%. 



Still pending are chemistries and CBC.



ASSESSMENT:  

1. Acute exacerbation of chronic obstructive pulmonary disease.

2. Oxygen-dependent patient with chronic obstructive pulmonary disease.



PLAN:  Admit.  Continue DuoNeb.  Continue steroids.  We have explained to the

patient that she will need lifelong 24 hour per day oxygen. 







Job ID:  [id number]

## 2019-04-24 VITALS — TEMPERATURE: 98.9 F | SYSTOLIC BLOOD PRESSURE: 147 MMHG | DIASTOLIC BLOOD PRESSURE: 67 MMHG

## 2019-04-24 LAB
ANION GAP SERPL CALC-SCNC: 15 MMOL/L (ref 10–20)
BASOPHILS # BLD AUTO: 0 THOU/UL (ref 0–0.2)
BASOPHILS NFR BLD AUTO: 0.4 % (ref 0–1)
BUN SERPL-MCNC: 12 MG/DL (ref 9.8–20.1)
CALCIUM SERPL-MCNC: 9.7 MG/DL (ref 7.8–10.44)
CHLORIDE SERPL-SCNC: 102 MMOL/L (ref 98–107)
CO2 SERPL-SCNC: 27 MMOL/L (ref 23–31)
CREAT CL PREDICTED SERPL C-G-VRATE: 45 ML/MIN (ref 70–130)
EOSINOPHIL # BLD AUTO: 0.2 THOU/UL (ref 0–0.7)
EOSINOPHIL NFR BLD AUTO: 1.6 % (ref 0–10)
GLUCOSE SERPL-MCNC: 88 MG/DL (ref 83–110)
HGB BLD-MCNC: 13.5 G/DL (ref 12–16)
LYMPHOCYTES # BLD: 1.8 THOU/UL (ref 1.2–3.4)
LYMPHOCYTES NFR BLD AUTO: 18.6 % (ref 21–51)
MCH RBC QN AUTO: 29.7 PG (ref 27–31)
MCV RBC AUTO: 93.5 FL (ref 78–98)
MONOCYTES # BLD AUTO: 0.8 THOU/UL (ref 0.11–0.59)
MONOCYTES NFR BLD AUTO: 7.7 % (ref 0–10)
NEUTROPHILS # BLD AUTO: 7 THOU/UL (ref 1.4–6.5)
NEUTROPHILS NFR BLD AUTO: 71.8 % (ref 42–75)
PLATELET # BLD AUTO: 296 THOU/UL (ref 130–400)
POTASSIUM SERPL-SCNC: 3.7 MMOL/L (ref 3.5–5.1)
RBC # BLD AUTO: 4.56 MILL/UL (ref 4.2–5.4)
SODIUM SERPL-SCNC: 140 MMOL/L (ref 136–145)
WBC # BLD AUTO: 9.8 THOU/UL (ref 4.8–10.8)

## 2019-04-24 RX ADMIN — HYDROCODONE BITARTRATE AND ACETAMINOPHEN PRN TAB: 7.5; 325 TABLET ORAL at 08:03

## 2019-04-24 RX ADMIN — HYDROCODONE BITARTRATE AND ACETAMINOPHEN PRN TAB: 7.5; 325 TABLET ORAL at 02:01

## 2019-04-24 NOTE — PRG
DATE OF SERVICE:  04/24/2019



ADDENDUM:  Please add this as an addendum to the note of Dr. Lilli Rosario. 



Ms. Eid is at her baseline.  She is resting in bed, in no distress.  Her

shortness of breath is at baseline.  She will be discharged today, and I have

suggested that we add Spiriva for long-term inhalation treatment.  We have also

explained to her carefully that she needs to use O2 24 hours a day for the rest of

her life.  We have also counseled that she quit smoking.  We will supply her with a

nicotine patch prescription. 







Job ID:  [id number]

## 2019-04-24 NOTE — PDOC.FM
- Subjective


Subjective: 





High BPs overnight, baldemar denies HA, chest pain , vision changes. Says 

breathing is better. C/o lower back pain, requesting home pain meds she takes. 





- Objective


MAR Reviewed: Yes


Vital Signs & Weight: 


 Vital Signs (12 hours)











  Temp Pulse Resp BP BP Pulse Ox


 


 04/24/19 08:08   85   195/86 H  


 


 04/24/19 07:03       94 L


 


 04/24/19 07:02  98.6 F  89  18   194/86 H  97


 


 04/24/19 07:01   94  16    94 L


 


 04/24/19 06:15     195/93 H  


 


 04/24/19 05:08  98.6 F  84  19   208/97 H  92 L


 


 04/24/19 02:18       95


 


 04/24/19 02:14    16   


 


 04/23/19 23:27  98.1 F  104 H  20   132/69  92 L


 


 04/23/19 22:26   94  16   








 Weight











Weight                         50.394 kg














I&O: 


 











 04/23/19 04/24/19 04/25/19





 06:59 06:59 06:59


 


Intake Total  1270 


 


Output Total  1800 


 


Balance  -530 











Result Diagrams: 


 04/24/19 04:29





 04/24/19 04:29





Phys Exam





- Physical Examination


Constitutional: NAD


HEENT: PERRLA, moist MMs


dec respiratory effort, mild expiratory wheezing


Cardiovascular: RRR, no significant murmur


Gastrointestinal: soft, non-tender


Neurological: non-focal, moves all 4 limbs


Psychiatric: normal affect, A&O x 3





Dx/Plan


(1) Afib


Code(s): I48.91 - UNSPECIFIED ATRIAL FIBRILLATION   Status: Acute   





(2) COPD exacerbation


Code(s): J44.1 - CHRONIC OBSTRUCTIVE PULMONARY DISEASE W (ACUTE) EXACERBATION   

Status: Acute   





(3) CAD (coronary artery disease)


Code(s): I25.10 - ATHSCL HEART DISEASE OF NATIVE CORONARY ARTERY W/O ANG PCTRS 

  Status: Chronic   


Qualifiers: 


   Coronary Disease-Associated Artery/Lesion type: native artery   Native vs. 

transplanted heart: native heart   Associated angina: without angina   

Qualified Code(s): I25.10 - Atherosclerotic heart disease of native coronary 

artery without angina pectoris   





(4) Chronic pain syndrome


Code(s): G89.4 - CHRONIC PAIN SYNDROME   Status: Chronic   





- Plan


Plan: 





COPD exacerbation


-On home O2 around baseline


-Continue steroids, duonebs


-Neg procal, no fever, no imaging indicative of PNA-no need to initiate abx


-Pt w/ likely inc O2 requirement, advised to wear oxygen contniuously


-May need rpt echo in outpt setting to assess for pulm HTN


-Counseled on smoking cessation








COPD


-resume home med regimen upon discharge


-daughter states she has all COPD meds at home including duonebs nebulizer, 

maintenance inhaler





afib


-home meds





HTN


-elevated, likely 2/2 pain


-resume home meds, recheck, PRN antihypertensive if SBP >180





hypothyroidism


-home meds





Chronic back pain


-Resume home meds





CODE: FULL

## 2019-04-25 NOTE — DIS
DATE OF ADMISSION:  04/23/2019



DATE OF DISCHARGE:  04/24/2019



RESIDENT:  Lilli Rosario, PGY-1.



ADMITTING ATTENDING:  Tracy Ballard MD.



DISCHARGE ATTENDING:  Brad Kearney MD.



CONSULTS:  None.



PROCEDURES:  None.



PRIMARY DIAGNOSES:  

1. Acute hypoxic respiratory failure requiring nasal cannula, secondary to COPD 
exacerbation

2. Acute COPD exacerbation.



SECONDARY DIAGNOSES:  

1. COPD

2. Suspected underlying pulmonary hypertension.

3. History of atrial fibrillation and atrial flutter.

4. Hypothyroidism.

5. History of myocardial infarction x2.

6. Tobacco use.

7. Chronic low back pain.



DISCHARGE MEDICATIONS:  

1. Prednisone 40 mg p.o. q.a.m. for 3 more days.

2. Nicoderm 21 mg patch transdermal q.24 hours.

3. Spiriva HandiHaler 18 mcg inhaled daily.

4. DuoNeb 3 mL nebulizer q.2 hours p.r.n.

5. DuoNeb 3 mL q.4 hours.

6. TUMS.

7. Tylenol. 

Rest of the patient's home medications, which were not reconciled due to no 
updated

list as daughter was supposed to bring. 



DISCONTINUE MEDICATIONS:  None.



HOSPITAL COURSE:  Ms. Nivia Eid is a 78-year-old female with an extensive

smoking history, who presented to the ER for "feeling weird." The patient had 
been short of breath for 2 days with increased green sputum.  She is typically 
on

2 L of home O2 only at night but had been having an increased requirement for 
daytime use.  She required 4L in the ER to remain non-hypoxic.  Labs and x-ray 
were negative for signs of pneumonia.  The patient was treated as an acute COPD 
exacerbation and started on

breathing treatments.  She improved over the course of her stay in the hospital
, but ultimately needing 2L O2 constantly during both day and night. 

ABG was consistent with chronic hypoxemia.  There was suspicion for underlying

pulmonary hypertension from her COPD. TTE was not obtained to confirm this 
since patient  had one 6 months

prior that was normal.  The patient was advised to continue wearing O2 
continuously

and to titrate to goal saturation of 92%.  She was also advised to quit 
smoking. The patient indicated understanding and she was sent home with

tobacco patches with instructions to follow up. .The patient does live with 
daughter who helps take care of her. Daughter was called for updates and for 
follow up instructions.



DISPOSITION:  Stable.



DISCHARGE INSTRUCTIONS:  

1. Location:  Home.

2. Diet:  Diabetic and heart healthy diet.

3. Activity:  As tolerated.

4. Followup:  Please follow up with Dr. Alston in 2 to 3 days.

5. Please continue using home O2 and titrating to peripheral O2 saturation 90% 
to

92% as discussed. 

6. Please quit smoking.





Job ID:  [id number]



MTDD

## 2019-06-04 ENCOUNTER — HOSPITAL ENCOUNTER (OUTPATIENT)
Dept: HOSPITAL 92 - RAD | Age: 79
Discharge: HOME | End: 2019-06-04
Attending: INTERNAL MEDICINE
Payer: MEDICARE

## 2019-06-04 DIAGNOSIS — R06.00: Primary | ICD-10-CM

## 2019-06-04 PROCEDURE — 71046 X-RAY EXAM CHEST 2 VIEWS: CPT

## 2019-06-04 NOTE — RAD
EXAM:

Chest Two Views



6/4/2019 12:01 PM



HISTORY:

Dyspnea



COMPARISON:

April 22, 2019



FINDINGS:

Heart: Normal in size and contour.

Pulmonary vessels: Normal.

Costophrenic angles: Clear.

Lungs: No acute airspace opacity or pleural effusion is evident. Scattered fibrotic change is stable

Pneumothorax: None.

Osseous structures:There is multilevel vertebroplasty changes. The moderate wedge compression fractur
e involving mid thoracic vertebra is stable. There are pedicle screws involving multiple lumbar

vertebral levels.

Additional findings:   None.



IMPRESSION:

No significant acute intrathoracic disease.



Reported By: Hayes Graves 

Electronically Signed:  6/4/2019 12:02 PM

## 2019-06-26 ENCOUNTER — HOSPITAL ENCOUNTER (OUTPATIENT)
Dept: HOSPITAL 9 - MADRAD | Age: 79
Discharge: HOME | End: 2019-06-26
Payer: MEDICARE

## 2019-06-26 DIAGNOSIS — Z96.642: ICD-10-CM

## 2019-06-26 DIAGNOSIS — M47.816: ICD-10-CM

## 2019-06-26 DIAGNOSIS — I48.0: ICD-10-CM

## 2019-06-26 DIAGNOSIS — M47.25: Primary | ICD-10-CM

## 2019-06-26 DIAGNOSIS — Z98.1: ICD-10-CM

## 2019-06-26 DIAGNOSIS — Z98.890: ICD-10-CM

## 2019-06-26 DIAGNOSIS — M80.08XD: ICD-10-CM

## 2019-06-26 DIAGNOSIS — M41.9: ICD-10-CM

## 2019-06-26 DIAGNOSIS — M51.36: ICD-10-CM

## 2019-06-26 LAB
ANION GAP SERPL CALC-SCNC: 14 MMOL/L (ref 10–20)
BUN SERPL-MCNC: 14 MG/DL (ref 9.8–20.1)
CALCIUM SERPL-MCNC: 9.4 MG/DL (ref 7.8–10.44)
CHLORIDE SERPL-SCNC: 99 MMOL/L (ref 98–107)
CO2 SERPL-SCNC: 34 MMOL/L (ref 23–31)
CREAT CL PREDICTED SERPL C-G-VRATE: 0 ML/MIN (ref 70–130)
GLUCOSE SERPL-MCNC: 98 MG/DL (ref 83–110)
POTASSIUM SERPL-SCNC: 4.6 MMOL/L (ref 3.5–5.1)
SODIUM SERPL-SCNC: 142 MMOL/L (ref 136–145)

## 2019-06-26 PROCEDURE — 72070 X-RAY EXAM THORAC SPINE 2VWS: CPT

## 2019-06-26 PROCEDURE — 72100 X-RAY EXAM L-S SPINE 2/3 VWS: CPT

## 2019-06-26 PROCEDURE — 80048 BASIC METABOLIC PNL TOTAL CA: CPT

## 2019-06-26 PROCEDURE — 36415 COLL VENOUS BLD VENIPUNCTURE: CPT

## 2019-06-26 NOTE — RAD
EXAM:

THORACIC SPINE TWO VIEWS:

6/26/19

 

HISTORY: 

Radiculopathy, thoracolumbar region, spondylosis.

 

COMPARISON: 

6/11/15, chest two views 6/4/19.

 

FINDINGS: 

Extensive multilevel vertebroplasty changes are noted extending from T12 through T7. Stable vertical 
height loss greater than 50% of T6. Vertebroplasty changes at T3. Mild dextroscoliosis of the lumboth
oracic vertebral column as well as some levoscoliosis of the mid thoracic vertebral column. There is 
severe bone demineralization. 

 

IMPRESSION: 

Extensive vertebroplasty changes. Stable vertical height loss of T6. Minimal scoliotic deformity. No 
significant acute process.

 

POS: OFF

## 2019-07-14 ENCOUNTER — HOSPITAL ENCOUNTER (OUTPATIENT)
Dept: HOSPITAL 92 - ERS | Age: 79
Setting detail: OBSERVATION
Discharge: HOME | End: 2019-07-14
Attending: FAMILY MEDICINE | Admitting: FAMILY MEDICINE
Payer: MEDICARE

## 2019-07-14 ENCOUNTER — HOSPITAL ENCOUNTER (EMERGENCY)
Dept: HOSPITAL 9 - MADERS | Age: 79
Discharge: ANOTHER HOSPITAL | End: 2019-07-14
Payer: MEDICARE

## 2019-07-14 VITALS — TEMPERATURE: 97.2 F | SYSTOLIC BLOOD PRESSURE: 162 MMHG | DIASTOLIC BLOOD PRESSURE: 73 MMHG

## 2019-07-14 VITALS — BODY MASS INDEX: 23.3 KG/M2

## 2019-07-14 DIAGNOSIS — Z88.8: ICD-10-CM

## 2019-07-14 DIAGNOSIS — E03.9: ICD-10-CM

## 2019-07-14 DIAGNOSIS — Z79.82: ICD-10-CM

## 2019-07-14 DIAGNOSIS — Z79.01: ICD-10-CM

## 2019-07-14 DIAGNOSIS — I10: ICD-10-CM

## 2019-07-14 DIAGNOSIS — R07.89: Primary | ICD-10-CM

## 2019-07-14 DIAGNOSIS — Z79.899: ICD-10-CM

## 2019-07-14 DIAGNOSIS — E78.5: ICD-10-CM

## 2019-07-14 DIAGNOSIS — I48.91: ICD-10-CM

## 2019-07-14 DIAGNOSIS — J44.9: ICD-10-CM

## 2019-07-14 DIAGNOSIS — Z79.51: ICD-10-CM

## 2019-07-14 DIAGNOSIS — G89.29: ICD-10-CM

## 2019-07-14 DIAGNOSIS — F17.210: ICD-10-CM

## 2019-07-14 DIAGNOSIS — Z91.040: ICD-10-CM

## 2019-07-14 LAB
25(OH)D3+25(OH)D2 SERPL-MCNC: 45 NG/ML (ref 30–?)
ALBUMIN SERPL BCG-MCNC: 3.9 G/DL (ref 3.4–4.8)
ALP SERPL-CCNC: 99 U/L (ref 40–150)
ALT SERPL W P-5'-P-CCNC: 16 U/L (ref 8–55)
ANION GAP SERPL CALC-SCNC: 14 MMOL/L (ref 10–20)
AST SERPL-CCNC: 19 U/L (ref 5–34)
BASOPHILS # BLD AUTO: 0.1 THOU/UL (ref 0–0.2)
BASOPHILS NFR BLD AUTO: 0.7 % (ref 0–1)
BILIRUB SERPL-MCNC: (no result) MG/DL (ref 0.2–1.2)
BUN SERPL-MCNC: 18 MG/DL (ref 9.8–20.1)
CALCIUM SERPL-MCNC: 9.2 MG/DL (ref 7.8–10.44)
CHLORIDE SERPL-SCNC: 98 MMOL/L (ref 98–107)
CO2 SERPL-SCNC: 33 MMOL/L (ref 23–31)
CREAT CL PREDICTED SERPL C-G-VRATE: 0 ML/MIN (ref 70–130)
EOSINOPHIL # BLD AUTO: 0.3 THOU/UL (ref 0–0.7)
EOSINOPHIL NFR BLD AUTO: 2.2 % (ref 0–10)
GLOBULIN SER CALC-MCNC: 2.7 G/DL (ref 2.4–3.5)
GLUCOSE SERPL-MCNC: 96 MG/DL (ref 83–110)
HGB BLD-MCNC: 12.3 G/DL (ref 12–16)
LYMPHOCYTES # BLD AUTO: 2.2 THOU/UL (ref 1.2–3.4)
LYMPHOCYTES NFR BLD AUTO: 19.1 % (ref 21–51)
MAGNESIUM SERPL-MCNC: 2 MG/DL (ref 1.6–2.6)
MCH RBC QN AUTO: 29.6 PG (ref 27–31)
MCV RBC AUTO: 92 FL (ref 78–98)
MONOCYTES # BLD AUTO: 0.8 THOU/UL (ref 0.11–0.59)
MONOCYTES NFR BLD AUTO: 6.7 % (ref 0–10)
NEUTROPHILS # BLD AUTO: 8.3 THOU/UL (ref 1.4–6.5)
NEUTROPHILS NFR BLD AUTO: 71.4 % (ref 42–75)
PLATELET # BLD AUTO: 231 THOU/UL (ref 130–400)
POTASSIUM SERPL-SCNC: 3.6 MMOL/L (ref 3.5–5.1)
RBC # BLD AUTO: 4.14 MILL/UL (ref 4.2–5.4)
SODIUM SERPL-SCNC: 141 MMOL/L (ref 136–145)
TROPONIN I SERPL DL<=0.01 NG/ML-MCNC: (no result) NG/ML (ref ?–0.03)
TROPONIN I SERPL DL<=0.01 NG/ML-MCNC: (no result) NG/ML (ref ?–0.03)
TSH SERPL DL<=0.005 MIU/L-ACNC: 2.56 UIU/ML (ref 0.35–4.94)
WBC # BLD AUTO: 11.6 THOU/UL (ref 4.8–10.8)

## 2019-07-14 PROCEDURE — 80053 COMPREHEN METABOLIC PANEL: CPT

## 2019-07-14 PROCEDURE — 36415 COLL VENOUS BLD VENIPUNCTURE: CPT

## 2019-07-14 PROCEDURE — 83880 ASSAY OF NATRIURETIC PEPTIDE: CPT

## 2019-07-14 PROCEDURE — 85025 COMPLETE CBC W/AUTO DIFF WBC: CPT

## 2019-07-14 PROCEDURE — 84443 ASSAY THYROID STIM HORMONE: CPT

## 2019-07-14 PROCEDURE — 83735 ASSAY OF MAGNESIUM: CPT

## 2019-07-14 PROCEDURE — 99285 EMERGENCY DEPT VISIT HI MDM: CPT

## 2019-07-14 PROCEDURE — 71045 X-RAY EXAM CHEST 1 VIEW: CPT

## 2019-07-14 PROCEDURE — 84100 ASSAY OF PHOSPHORUS: CPT

## 2019-07-14 PROCEDURE — 82306 VITAMIN D 25 HYDROXY: CPT

## 2019-07-14 PROCEDURE — 93005 ELECTROCARDIOGRAM TRACING: CPT

## 2019-07-14 PROCEDURE — 84484 ASSAY OF TROPONIN QUANT: CPT

## 2019-07-14 PROCEDURE — 96374 THER/PROPH/DIAG INJ IV PUSH: CPT

## 2019-07-14 PROCEDURE — G0378 HOSPITAL OBSERVATION PER HR: HCPCS

## 2019-07-14 NOTE — RAD
CHEST 1 VIEW:

 

Date:  07/14/19 

 

INDICATION:

Chest pain. 

 

COMPARISON:  

Prior exam dated 06/04/19. 

 

FINDINGS:

There are low lung volumes accentuated by cardiac silhouette and pulmonary vasculature. There is mild
 left basilar atelectasis. No acute osseous abnormality is evident. 

 

IMPRESSION: 

Hypoventilation with left basilar atelectasis. 

 

 

POS: BH

## 2019-07-20 NOTE — EKG
Test Reason : CHEST PAIN

Blood Pressure : ***/*** mmHG

Vent. Rate : 064 BPM     Atrial Rate : 064 BPM

   P-R Int : 184 ms          QRS Dur : 098 ms

    QT Int : 450 ms       P-R-T Axes : 061 053 055 degrees

   QTc Int : 464 ms

 

Normal sinus rhythm

Normal ECG

No changes

 

Confirmed by MONCHO EISENBERG, BETH ([id number]),  MIRTA DELGADO ([id number]) on 7/20/2019 1:15:53 PM

 

Referred By:             Confirmed By:BETH IVAN MD

## 2019-08-09 ENCOUNTER — HOSPITAL ENCOUNTER (OUTPATIENT)
Dept: HOSPITAL 9 - MADLAB | Age: 79
Discharge: HOME | End: 2019-08-09
Attending: FAMILY MEDICINE
Payer: MEDICARE

## 2019-08-09 DIAGNOSIS — D72.829: Primary | ICD-10-CM

## 2019-08-09 LAB
BACTERIA UR QL AUTO: (no result) HPF
RBC UR QL AUTO: (no result) HPF (ref 0–3)
WBC UR QL AUTO: (no result) HPF (ref 0–3)

## 2019-08-09 PROCEDURE — 81001 URINALYSIS AUTO W/SCOPE: CPT

## 2019-08-23 ENCOUNTER — HOSPITAL ENCOUNTER (INPATIENT)
Dept: HOSPITAL 92 - ERS | Age: 79
LOS: 18 days | Discharge: HOSPICE | DRG: 207 | End: 2019-09-10
Attending: FAMILY MEDICINE | Admitting: FAMILY MEDICINE
Payer: MEDICARE

## 2019-08-23 VITALS — BODY MASS INDEX: 20 KG/M2

## 2019-08-23 DIAGNOSIS — J44.0: ICD-10-CM

## 2019-08-23 DIAGNOSIS — Z88.8: ICD-10-CM

## 2019-08-23 DIAGNOSIS — Z96.641: ICD-10-CM

## 2019-08-23 DIAGNOSIS — Z85.118: ICD-10-CM

## 2019-08-23 DIAGNOSIS — J44.1: ICD-10-CM

## 2019-08-23 DIAGNOSIS — Z90.49: ICD-10-CM

## 2019-08-23 DIAGNOSIS — I13.0: ICD-10-CM

## 2019-08-23 DIAGNOSIS — Z79.01: ICD-10-CM

## 2019-08-23 DIAGNOSIS — N18.2: ICD-10-CM

## 2019-08-23 DIAGNOSIS — Z90.710: ICD-10-CM

## 2019-08-23 DIAGNOSIS — I48.91: ICD-10-CM

## 2019-08-23 DIAGNOSIS — Z66: ICD-10-CM

## 2019-08-23 DIAGNOSIS — J96.02: ICD-10-CM

## 2019-08-23 DIAGNOSIS — Z90.89: ICD-10-CM

## 2019-08-23 DIAGNOSIS — I42.9: ICD-10-CM

## 2019-08-23 DIAGNOSIS — J20.9: ICD-10-CM

## 2019-08-23 DIAGNOSIS — F11.20: ICD-10-CM

## 2019-08-23 DIAGNOSIS — I50.21: ICD-10-CM

## 2019-08-23 DIAGNOSIS — Z91.048: ICD-10-CM

## 2019-08-23 DIAGNOSIS — Z79.82: ICD-10-CM

## 2019-08-23 DIAGNOSIS — F17.210: ICD-10-CM

## 2019-08-23 DIAGNOSIS — E03.9: ICD-10-CM

## 2019-08-23 DIAGNOSIS — G89.4: ICD-10-CM

## 2019-08-23 DIAGNOSIS — N17.9: ICD-10-CM

## 2019-08-23 DIAGNOSIS — J96.01: Primary | ICD-10-CM

## 2019-08-23 DIAGNOSIS — I47.1: ICD-10-CM

## 2019-08-23 DIAGNOSIS — I25.10: ICD-10-CM

## 2019-08-23 DIAGNOSIS — R73.9: ICD-10-CM

## 2019-08-23 DIAGNOSIS — I21.A1: ICD-10-CM

## 2019-08-23 DIAGNOSIS — Z99.81: ICD-10-CM

## 2019-08-23 LAB
ALBUMIN SERPL BCG-MCNC: 3.7 G/DL (ref 3.4–4.8)
ALP SERPL-CCNC: 109 U/L (ref 40–150)
ALT SERPL W P-5'-P-CCNC: 23 U/L (ref 8–55)
ANALYZER IN CARDIO: (no result)
ANION GAP SERPL CALC-SCNC: 15 MMOL/L (ref 10–20)
APTT PPP: 33.8 SEC (ref 22.9–36.1)
AST SERPL-CCNC: 28 U/L (ref 5–34)
BASE EXCESS STD BLDA CALC-SCNC: 9.5 MEQ/L
BASOPHILS # BLD AUTO: 0 THOU/UL (ref 0–0.2)
BASOPHILS NFR BLD AUTO: 0 % (ref 0–1)
BILIRUB SERPL-MCNC: 0.2 MG/DL (ref 0.2–1.2)
BUN SERPL-MCNC: 14 MG/DL (ref 9.8–20.1)
CA-I BLD-SCNC: 1.11 MMOL/L
CA-I BLDA-SCNC: 1.1 MMOL/L (ref 1.12–1.3)
CALCIUM SERPL-MCNC: 8.9 MG/DL (ref 7.8–10.44)
CHLORIDE SERPL-SCNC: 92 MMOL/L (ref 98–107)
CHLORIDE SERPL-SCNC: 93 MMOL/L (ref 98–107)
CO2 BLDV CALC-SCNC: 37.6 MMOL/L (ref 22–28)
CO2 SERPL-SCNC: 33 MMOL/L (ref 23–31)
CO2 TENSION (PVCO2): 58.3 MMHG (ref 40–50)
CREAT CL PREDICTED SERPL C-G-VRATE: 0 ML/MIN (ref 70–130)
EOSINOPHIL # BLD AUTO: 0.1 THOU/UL (ref 0–0.7)
EOSINOPHIL NFR BLD AUTO: 0.5 % (ref 0–10)
GLOBULIN SER CALC-MCNC: 2.3 G/DL (ref 2.4–3.5)
GLUCOSE SERPL-MCNC: 106 MG/DL (ref 83–110)
GLUCOSE SERPL-MCNC: 113 MG/DL (ref 83–110)
HCO3 BLDA-SCNC: 34.2 MEQ/L (ref 22–28)
HCT VFR BLD CALC: 35 % (ref 36–47)
HCT VFR BLDA CALC: 35 % (ref 36–47)
HEMOGLOBIN - CALC: 12 G/DL (ref 12–16)
HGB BLD-MCNC: 11.3 G/DL (ref 12–16)
HGB BLDA-MCNC: 12 G/DL (ref 12–16)
INR PPP: 1
LYMPHOCYTES # BLD: 0.7 THOU/UL (ref 1.2–3.4)
LYMPHOCYTES NFR BLD AUTO: 6.2 % (ref 21–51)
MCH RBC QN AUTO: 30.2 PG (ref 27–31)
MCV RBC AUTO: 94.5 FL (ref 78–98)
MONOCYTES # BLD AUTO: 0.3 THOU/UL (ref 0.11–0.59)
MONOCYTES NFR BLD AUTO: 2.6 % (ref 0–10)
NEUTROPHILS # BLD AUTO: 9.7 THOU/UL (ref 1.4–6.5)
NEUTROPHILS NFR BLD AUTO: 90.8 % (ref 42–75)
O2 A-A PPRESDIFF RESPIRATORY: 163.07 MM[HG] (ref 0–20)
PCO2 BLDA: 46.5 MMHG (ref 35–45)
PH BLDA: 7.48 [PH] (ref 7.35–7.45)
PLATELET # BLD AUTO: 319 THOU/UL (ref 130–400)
PO2 BLDA: 64 MMHG (ref 70–?)
POTASSIUM BLD-SCNC: 3.96 MMOL/L (ref 3.7–5.3)
POTASSIUM SERPL-SCNC: 3.8 MMOL/L (ref 3.5–5.1)
POTASSIUM SERPL-SCNC: 4.1 MMOL/L (ref 3.5–5.1)
PROTHROMBIN TIME: 13.5 SEC (ref 12–14.7)
RBC # BLD AUTO: 3.73 MILL/UL (ref 4.2–5.4)
SAO2 % BLDV FROM PO2: 49.9 % (ref 60–85)
SODIUM SERPL-SCNC: 136 MMOL/L (ref 136–145)
SODIUM SERPL-SCNC: 137 MMOL/L (ref 138–145)
SPECIMEN DRAWN FROM PATIENT: (no result)
WBC # BLD AUTO: 10.7 THOU/UL (ref 4.8–10.8)

## 2019-08-23 PROCEDURE — 96376 TX/PRO/DX INJ SAME DRUG ADON: CPT

## 2019-08-23 PROCEDURE — 85379 FIBRIN DEGRADATION QUANT: CPT

## 2019-08-23 PROCEDURE — G0009 ADMIN PNEUMOCOCCAL VACCINE: HCPCS

## 2019-08-23 PROCEDURE — 93306 TTE W/DOPPLER COMPLETE: CPT

## 2019-08-23 PROCEDURE — 84145 PROCALCITONIN (PCT): CPT

## 2019-08-23 PROCEDURE — 71045 X-RAY EXAM CHEST 1 VIEW: CPT

## 2019-08-23 PROCEDURE — 36415 COLL VENOUS BLD VENIPUNCTURE: CPT

## 2019-08-23 PROCEDURE — 87070 CULTURE OTHR SPECIMN AEROBIC: CPT

## 2019-08-23 PROCEDURE — 83605 ASSAY OF LACTIC ACID: CPT

## 2019-08-23 PROCEDURE — 94660 CPAP INITIATION&MGMT: CPT

## 2019-08-23 PROCEDURE — 87040 BLOOD CULTURE FOR BACTERIA: CPT

## 2019-08-23 PROCEDURE — 87086 URINE CULTURE/COLONY COUNT: CPT

## 2019-08-23 PROCEDURE — 5A1945Z RESPIRATORY VENTILATION, 24-96 CONSECUTIVE HOURS: ICD-10-PCS | Performed by: INTERNAL MEDICINE

## 2019-08-23 PROCEDURE — 85025 COMPLETE CBC W/AUTO DIFF WBC: CPT

## 2019-08-23 PROCEDURE — S0028 INJECTION, FAMOTIDINE, 20 MG: HCPCS

## 2019-08-23 PROCEDURE — 82330 ASSAY OF CALCIUM: CPT

## 2019-08-23 PROCEDURE — 90670 PCV13 VACCINE IM: CPT

## 2019-08-23 PROCEDURE — 96375 TX/PRO/DX INJ NEW DRUG ADDON: CPT

## 2019-08-23 PROCEDURE — 36556 INSERT NON-TUNNEL CV CATH: CPT

## 2019-08-23 PROCEDURE — 84443 ASSAY THYROID STIM HORMONE: CPT

## 2019-08-23 PROCEDURE — 93005 ELECTROCARDIOGRAM TRACING: CPT

## 2019-08-23 PROCEDURE — 85730 THROMBOPLASTIN TIME PARTIAL: CPT

## 2019-08-23 PROCEDURE — 80053 COMPREHEN METABOLIC PANEL: CPT

## 2019-08-23 PROCEDURE — 83880 ASSAY OF NATRIURETIC PEPTIDE: CPT

## 2019-08-23 PROCEDURE — 05H533Z INSERTION OF INFUSION DEVICE INTO RIGHT SUBCLAVIAN VEIN, PERCUTANEOUS APPROACH: ICD-10-PCS | Performed by: INTERNAL MEDICINE

## 2019-08-23 PROCEDURE — 36416 COLLJ CAPILLARY BLOOD SPEC: CPT

## 2019-08-23 PROCEDURE — 85610 PROTHROMBIN TIME: CPT

## 2019-08-23 PROCEDURE — 96367 TX/PROPH/DG ADDL SEQ IV INF: CPT

## 2019-08-23 PROCEDURE — 94640 AIRWAY INHALATION TREATMENT: CPT

## 2019-08-23 PROCEDURE — 87205 SMEAR GRAM STAIN: CPT

## 2019-08-23 PROCEDURE — 82805 BLOOD GASES W/O2 SATURATION: CPT

## 2019-08-23 PROCEDURE — 94002 VENT MGMT INPAT INIT DAY: CPT

## 2019-08-23 PROCEDURE — 94003 VENT MGMT INPAT SUBQ DAY: CPT

## 2019-08-23 PROCEDURE — 80048 BASIC METABOLIC PNL TOTAL CA: CPT

## 2019-08-23 PROCEDURE — 83735 ASSAY OF MAGNESIUM: CPT

## 2019-08-23 PROCEDURE — 96368 THER/DIAG CONCURRENT INF: CPT

## 2019-08-23 PROCEDURE — 84100 ASSAY OF PHOSPHORUS: CPT

## 2019-08-23 PROCEDURE — 81003 URINALYSIS AUTO W/O SCOPE: CPT

## 2019-08-23 PROCEDURE — 96365 THER/PROPH/DIAG IV INF INIT: CPT

## 2019-08-23 PROCEDURE — 82553 CREATINE MB FRACTION: CPT

## 2019-08-23 PROCEDURE — 74177 CT ABD & PELVIS W/CONTRAST: CPT

## 2019-08-23 PROCEDURE — 51702 INSERT TEMP BLADDER CATH: CPT

## 2019-08-23 PROCEDURE — 93010 ELECTROCARDIOGRAM REPORT: CPT

## 2019-08-23 PROCEDURE — 80162 ASSAY OF DIGOXIN TOTAL: CPT

## 2019-08-23 PROCEDURE — 82803 BLOOD GASES ANY COMBINATION: CPT

## 2019-08-23 PROCEDURE — 90471 IMMUNIZATION ADMIN: CPT

## 2019-08-23 PROCEDURE — 84484 ASSAY OF TROPONIN QUANT: CPT

## 2019-08-23 PROCEDURE — 70450 CT HEAD/BRAIN W/O DYE: CPT

## 2019-08-23 NOTE — RAD
EXAM: 

CHEST ONE VIEW



HISTORY:

Tube placement.



COMPARISON:

7/14/2019



FINDINGS:

Endotracheal tube is noted in place with the tip overlying the T4-T5 level. Nasogastric tube is noted
 in place. The tip is not definitively visualized but probably overlies the region of the distal

esophagus or GE junction and should be advanced. A right subclavian central venous catheter is noted 
in place which courses in a cephalad direction overlie the right neck. The tip is not imaged.



Cardiac silhouette is within normal limits. Pulmonary vasculature is at the upper limits of normal. T
here are increased bibasilar interstitial densities which could be related to chronic lung changes,

but none infectious process is a possibility. The left lateral costophrenic angle is excluded from vi
ew. 



Vascular calcifications are seen in a tortuous thoracic aorta. There are vertebroplasty changes invol
ving multiple thoracic vertebral bodies with postsurgical changes seen involving the lower thoracic

as well as upper lumbar spine with multilevel pedicular screws seen.



IMPRESSION:



1. Nasogastric tube is noted in place. The tip is not definitely visualized but probably overlies the
 region of the GE junction and should be advanced.

2. Right subclavian central venous catheter noted in place with the catheter overlying the right neck
. The tip is not visualized. No pneumothorax is seen.

3. Bibasilar interstitial densities which may be related to infectious process. Findings could potent
ially be related to mild chronic lung changes, but the interstitial densities do appear mildly

increased when compared to prior exam. Follow-up evaluation is recommended.

4. Endotracheal tube noted in place.

5. Vertebroplasty changes and postsurgical changes of the thoracic and lumbar spine.



Reported By: Jhonny Johnston 

Electronically Signed:  8/23/2019 9:06 PM

## 2019-08-23 NOTE — CT
CT Head without IV contrast



COMPARISON:

 11/16/2018.



HISTORY:

 Altered mental status.



TECHNIQUE: Axial CT imaging at 5 mm intervals from vertex through skull base without contrast



FINDINGS:

There is no evidence of an acute infarction, hemorrhage, mass effect, or midline shift. There is decr
eased attenuation seen in the periventricular white matter which is nonspecific but likely

attributable to chronic small vessel ischemic changes. Punctate low-density focus is seen in the infe
rior aspect left basal ganglia also seen on prior study and likely attributable to tiny remote

lacunar infarction. There is mild cerebral volume loss. The ventricular system is normal in size, sha
pe, and position for the degree of sulcal atrophy.



There is opacification of multiple ethmoidal air cells bilaterally with mucosal thickening in each fr
ontal sinus with minimal mucosal thickening in the left maxillary antrum. Mastoid air cells are

clear. 



Osseous structures appear intact. 



There is a increased density focus seen overlying the right internal jugular foramen likely related t
o patient's right subclavian central venous catheter noted on recent chest x-ray to extend

superiorly.



IMPRESSION:

1. No acute intracranial abnormality demonstrated.

2. Chronic small vessel ischemic changes and cerebral volume loss similar to prior exam.

3. Sinus disease. 

4. Radiopaque density with tip in the region of the jugular foramen likely related to patient's centr
al venous catheter noted to extend cephalad on the recent chest x-ray.



Reported By: Jhonny Johnston 

Electronically Signed:  8/23/2019 10:05 PM

## 2019-08-24 LAB
ALBUMIN SERPL BCG-MCNC: 3.1 G/DL (ref 3.4–4.8)
ALP SERPL-CCNC: 83 U/L (ref 40–150)
ALT SERPL W P-5'-P-CCNC: 19 U/L (ref 8–55)
ANALYZER IN CARDIO: (no result)
ANION GAP SERPL CALC-SCNC: 14 MMOL/L (ref 10–20)
AST SERPL-CCNC: 25 U/L (ref 5–34)
BASE EXCESS STD BLDA CALC-SCNC: 7 MEQ/L
BASOPHILS # BLD AUTO: 0.1 THOU/UL (ref 0–0.2)
BASOPHILS NFR BLD AUTO: 0.7 % (ref 0–1)
BILIRUB SERPL-MCNC: 0.3 MG/DL (ref 0.2–1.2)
BUN SERPL-MCNC: 13 MG/DL (ref 9.8–20.1)
CA-I BLDA-SCNC: 1.14 MMOL/L (ref 1.12–1.3)
CALCIUM SERPL-MCNC: 8.8 MG/DL (ref 7.8–10.44)
CHLORIDE SERPL-SCNC: 97 MMOL/L (ref 98–107)
CO2 SERPL-SCNC: 30 MMOL/L (ref 23–31)
CREAT CL PREDICTED SERPL C-G-VRATE: 43 ML/MIN (ref 70–130)
EOSINOPHIL # BLD AUTO: 0 THOU/UL (ref 0–0.7)
EOSINOPHIL NFR BLD AUTO: 0.5 % (ref 0–10)
GLOBULIN SER CALC-MCNC: 2.5 G/DL (ref 2.4–3.5)
GLUCOSE SERPL-MCNC: 89 MG/DL (ref 83–110)
HCO3 BLDA-SCNC: 31.8 MEQ/L (ref 22–28)
HCT VFR BLDA CALC: 33 % (ref 36–47)
HGB BLD-MCNC: 10.4 G/DL (ref 12–16)
HGB BLDA-MCNC: 11.3 G/DL (ref 12–16)
LYMPHOCYTES # BLD: 1.7 THOU/UL (ref 1.2–3.4)
LYMPHOCYTES NFR BLD AUTO: 18.7 % (ref 21–51)
MAGNESIUM SERPL-MCNC: 1.6 MG/DL (ref 1.6–2.6)
MCH RBC QN AUTO: 31.3 PG (ref 27–31)
MCV RBC AUTO: 94.5 FL (ref 78–98)
MONOCYTES # BLD AUTO: 0.6 THOU/UL (ref 0.11–0.59)
MONOCYTES NFR BLD AUTO: 6.8 % (ref 0–10)
NEUTROPHILS # BLD AUTO: 6.6 THOU/UL (ref 1.4–6.5)
NEUTROPHILS NFR BLD AUTO: 73.2 % (ref 42–75)
O2 A-A PPRESDIFF RESPIRATORY: 167.45 MM[HG] (ref 0–20)
PCO2 BLDA: 46.2 MMHG (ref 35–45)
PH BLDA: 7.46 [PH] (ref 7.35–7.45)
PLATELET # BLD AUTO: 282 THOU/UL (ref 130–400)
PO2 BLDA: 60 MMHG (ref 70–?)
POTASSIUM BLD-SCNC: 3.54 MMOL/L (ref 3.7–5.3)
POTASSIUM SERPL-SCNC: 3.7 MMOL/L (ref 3.5–5.1)
RBC # BLD AUTO: 3.32 MILL/UL (ref 4.2–5.4)
SODIUM SERPL-SCNC: 137 MMOL/L (ref 136–145)
SPECIMEN DRAWN FROM PATIENT: (no result)
WBC # BLD AUTO: 9 THOU/UL (ref 4.8–10.8)

## 2019-08-24 RX ADMIN — Medication SCH: at 09:13

## 2019-08-24 RX ADMIN — Medication SCH: at 21:13

## 2019-08-24 RX ADMIN — FENTANYL CITRATE SCH MLS: 50 INJECTION, SOLUTION INTRAMUSCULAR; INTRAVENOUS at 09:06

## 2019-08-24 RX ADMIN — FENTANYL CITRATE SCH MLS: 50 INJECTION, SOLUTION INTRAMUSCULAR; INTRAVENOUS at 21:59

## 2019-08-24 RX ADMIN — CEFTRIAXONE SCH MLS: 1 INJECTION, POWDER, FOR SOLUTION INTRAMUSCULAR; INTRAVENOUS at 05:39

## 2019-08-24 RX ADMIN — AZITHROMYCIN SCH MLS: 500 INJECTION, POWDER, LYOPHILIZED, FOR SOLUTION INTRAVENOUS at 00:45

## 2019-08-24 NOTE — PDOC.FPRHP
- History of Present Illness


Chief Complaint: ARDS


History of Present Illness: 


History obtained from medical staff, due to intubation of pt.





Pt is a 77 yo F with history of AFib (on Eliquis), COPD (on 2L @ home), chronic 

pain (percocet and morphine @ home), HTN, and Hypothyroidism who presents to 

the ED intubated by EMS in the field for Acute Hypoxic Respiratory Distress.  

She saw her PCP  in [place] earlier today and was given 3 meds (Bactrim, 

prednisone, and Doxy), Pt took 1 dose.  Pt called EMS this afternoon after 

experiencing dyspnea. When EMS arrived, they tried her on Bipap. She continued 

to experience SOB and fell, without hitting her head and became unconcious, so 

they inbuated her in the field.


ED Course: 


Once in the ED, CT of Head was negative. Pt was started on Vanc and Rocephin. A 

central line was placed and 500 cc bolus was given. Troponin was < 0.010, BNP: 

259.7, and UA neg for leuk, nitirites, and WBC. WBC: 10.7, PT, PTT, and INR 

were wnl. LA was neg at 0.6.





- Allergies/Adverse Reactions


 Allergies











Allergy/AdvReac Type Severity Reaction Status Date / Time


 


hydralazine Allergy   Verified 08/24/19 00:56


 


Latex, Natural Rubber Allergy   Verified 08/24/19 00:56














- Home Medications


 











 Medication  Instructions  Recorded  Confirmed  Type


 


Sennosides/Docusate Sodium 2 tab PO BID PRN  tab 11/06/18 08/24/19 Rx





[Senokot S]    


 


Furosemide [Lasix] 20 mg PO 0900,1400  tab 12/12/18 08/24/19 Rx


 


Levothyroxine Sodium [Synthroid] 50 mcg PO 0600  tab 12/12/18 08/24/19 Rx


 


Sertraline HCl [Zoloft] 50 mg PO DAILY  tab 12/12/18 08/24/19 Rx


 


Diltiazem HCl [Cardizem] 30 mg PO BID 01/30/19 08/24/19 History


 


Dronedarone HCl [Multaq] 400 mg PO BID-WM 01/30/19 08/24/19 History


 


Gabapentin 600 mg PO TID 01/30/19 08/24/19 History


 


Morphine Sulfate [Morphabond ER] 60 mg PO Q8H 01/30/19 08/24/19 History


 


Pravastatin Sodium [Pravachol] 20 mg PO HS #30 tab 01/30/19 08/24/19 Rx


 


Alendronate Sodium [Fosamax] 70 mg PO Q7D 08/24/19 08/24/19 History


 


Aspirin [Ecotrin Low Strength] 325 mg PO DAILY 08/24/19 08/24/19 History


 


Fluticasone/Salmeterol [Advair 1 inh IH BID 08/24/19 08/24/19 History





Diskus 500/50]    


 


Potassium Chloride 10 meq PO BID 08/24/19 08/24/19 History


 


oxyCODONE HCl/Acetaminophen 1 each PO TID 08/24/19 08/24/19 History





[Oxycodone-Acetaminophen ]    














- History


Obtained from previous stay, due to pt currently intubated





PMHx:Afib on Eliquis, Hypothyroidism, HTN, COPD on 2L, HLD, chronic pain on 

Percocet and morphine


 


PSHx: Hysterectomy, appy, patrica, hernia repair, R hip replacement, spinal 

surgery with hardware placement, tonsilectomy





FHx: No family history of cardiac conditions.


 


Social: 1ppd x60+ years, no alcohol or tobacco use. Lives at home with Daughter.





- Review of Systems


ROS unobtainable: due to endotracheal tube





- Vital signs


BP: 153/72  HR: 120 RR: 17 Tmax: 101.8 Pox: 100% on Ventilator  Wt: 63.4 kg   








- Physical Exam


Constitutional: other


-Constitutional: 


Unable to arouse with sternal rub or nail bed pressure.


HEENT: normocephalic and atraumatic, PERRLA, conjunctiva clear


Neck: supple, no LAD


Chest: no-tender to palpation


Heart: RRR, normal S1/S2, pulses present, no edema


-Lungs: 


Rhonchi diffusely present.


Abdomen: soft, non-tender, bowel sounds present


Musculoskeletal: normal structure


-Neurological: 


Unable to assess.


Skin: no rash/lesions, no jaundice


Heme/Lymphatic: no unusual bruising or bleeding, no LAD





FMR H&P: Results





- Labs


Result Diagrams: 


 08/24/19 04:54





 08/24/19 04:54


Lab results: 


 











WBC  10.7 thou/uL (4.8-10.8)   08/23/19  20:41    


 


Hgb  11.3 g/dL (12.0-16.0)  L  08/23/19  20:41    


 


Hct  35.2 % (36.0-47.0)  L  08/23/19  20:41    


 


MCV  94.5 fL (78.0-98.0)   08/23/19  20:41    


 


Plt Count  319 thou/uL (130-400)   08/23/19  20:41    


 


Neutrophils %  90.8 % (42.0-75.0)  H  08/23/19  20:41    


 


ABG pH  7.48  (7.35-7.45)  H  08/23/19  20:49    


 


ABG pCO2  46.5 mmHg (35.0-45.0)  H  08/23/19  20:49    


 


ABG pO2  64.0 mmHg (> 70.0)   08/23/19  20:49    


 


VBG pCO2  58.3 mmHg (40.0-50.0)  H  08/23/19  20:45    


 


VBG pO2  27.8 mmHg (35.0-45.0)  L  08/23/19  20:45    


 


Sodium  136 mmol/L (136-145)   08/23/19  20:41    


 


Potassium  4.1 mmol/L (3.5-5.1)   08/23/19  20:41    


 


Chloride  92 mmol/L ()  L  08/23/19  20:41    


 


Carbon Dioxide  33 mmol/L (23-31)  H  08/23/19  20:41    


 


BUN  14 mg/dL (9.8-20.1)   08/23/19  20:41    


 


Creatinine  1.05 mg/dL (0.6-1.1)   08/23/19  20:41    


 


Glucose  106 mg/dL ()   08/23/19  20:41    


 


Lactic Acid  0.6 mmol/L (0.5-2.2)   08/23/19  20:41    


 


Calcium  8.9 mg/dL (7.8-10.44)   08/23/19  20:41    


 


Total Bilirubin  0.2 mg/dL (0.2-1.2)   08/23/19  20:41    


 


AST  28 U/L (5-34)   08/23/19  20:41    


 


ALT  23 U/L (8-55)   08/23/19  20:41    


 


Alkaline Phosphatase  109 U/L ()   08/23/19  20:41    


 


B-Natriuretic Peptide  259.7 pg/mL (0-100)  H  08/23/19  20:41    


 


Serum Total Protein  6.0 g/dL (6.0-8.3)   08/23/19  20:41    


 


Albumin  3.7 g/dL (3.4-4.8)   08/23/19  20:41    


 


Urine Ketones  Negative mg/dL (Negative)   08/23/19  20:16    


 


Urine Blood  Negative  (Negative)   08/23/19  20:16    


 


Urine Nitrite  Negative  (Negative)   08/23/19  20:16    


 


Ur Leukocyte Esterase  Negative Ruy/uL (Negative)   08/23/19  20:16    














FMR H&P: A/P





- Problem List


(1) COPD exacerbation


Current Visit: No   Status: Acute   Code(s): J44.1 - CHRONIC OBSTRUCTIVE 

PULMONARY DISEASE W (ACUTE) EXACERBATION   





(2) Sepsis


Current Visit: No   Status: Suspected   Code(s): A41.9 - SEPSIS, UNSPECIFIED 

ORGANISM   


Qualifiers: 


   Sepsis type: sepsis due to unspecified organism   Qualified Code(s): A41.9 - 

Sepsis, unspecified organism   





- Plan


Mrs. Eid is a 77 yo F with h/o Afib, COPD on 2L, chronic pain on Morphine 

and Percocet, and HTN who presents with acute respiratory distress and 

intubated.





1. Acute Respiratory Distress Most likely 2/2 COPD Exacerbation


* Duoneb & Albuterol treatments


* IV Steroids


* Monitor O2 sats


* Vent managment and sedation


* Pulm Consult





2. SIRS/SEPSIS


Tachycardic, Tachypneic, TMax: 101.8


* Vanc and Cefepime in ER


* Cxray: bibasilar insterstitial densities


* Will switch to rocephin and azithro


* Await Cx





3. Afib


* Admitted to ICU on continuous telemetry.


* Continue home medications for rate control: Multaq





4. Chronic pan


* Will hold home morphine and percocet


* Continue home med: Gabapentin





5. HTN


* Will hold home medication for now


* Episode of hypotension in the ER possibly 2/2 sedation medication


* Will monitor





6. HLD


* Continue home med: pravastatin





7. Hypothryoidism


* Continue home levo





Lines: Central IJ


Diet: NPO


DVT Prophylaxis: Lovenox


Code Status: Full


GI Prophylaxis: Pepcid





Dispo: Inpt, pt needs to be weaned off vent and source of infection found.





FMR H&P: Upper Level





- Pertinent history





77 yo F presents for acute respiratory failure intubated by ems in field. She 

has PMH of COPD, chronic opiate dependence. Per report pt was seen by pcp and rx

'd medication for seeming COPD exacerbation, but was only able to  one. 

She had worsening sob and called ems. On ems arrival pt was tachypneic and 

hypoxic into the low 70s. RSI performed. In ED pt had central line placed and 

xr done. XR showed bibasilar interstitial markings chronic vs infiltrate. 





- Pertinent findings





ROS: Unable to obtain. 





PE: 


Gen: Sedated intubated


HEENT: ET tub in place, OG tube in place. NCAT. Pinpoint pupils.


CV: Regurg murmur, mild otherwise regular rate and rhythm


Resp: Diffuse rhonchi and few scattered expiratory wheezes


Abd: Soft NTND bsx4


Extremities: Warm, peripheral pulses intact. 1+ edema b/l LE.


Neuro: Sedated 





- Plan


Date/Time: 08/24/19 0029








ITerrell DO, have evaluated this patient and agree with findings/

plan as outlined by intern resident. Pertinent changes/additions are listed 

here.





1) ARDS: with acute hypoxic hypercapneic respiratory failure


- will admit to CCU and cont mech ventilation, concult pulm 


- ABG showed met alkalosis which is likely posthypercapneic alkalosis, will 

repeat 


- likely 2/2 copd exacerbation given lung exam findings and cxr findings


- given broad spectrum abx in ED, will switch to rocephin azithromycin





2) SIRS:


- febrile in field, unclear if this was after ketamine, otherwise no recurrent 

measured fever and nothing given for antipyretic


- will cont rocephin and azithromycin


- check procalcitonin





3) COPD, exacerbation


- duonebs, albuterol 


- cont mech ventilation and wean per pulm





4) HTN:


- labile bp, will cont to monitor consider restarting meds in am





5) A fib


- cont home meds


- cont eliquis


- RRR on exam and ekg sinus





6) Chronic opiate dependence/abuse:


- pt not given narcan in field, unclear scene on arrival per reports


- per chart review pt went into mild opiate withdrawal on last admission, cont 

home medications after weaning from vent





Dispo: Serious, will admit to ICU for mechanical ventilation and cont abx for 

acute COPD exxacerbation. Consult pulm in am, appreciate recs. 





DVT PPX: Eliquis


PCP: [facility]


Code Status: Full

## 2019-08-24 NOTE — CON
DATE OF CONSULTATION:  



HISTORY OF PRESENT ILLNESS:  Nivia Eid is a 78-year-old female, who is

intubated for mental status change and respiratory failure.  She is presently in the

ICU on the vent.  History as outlined by admitting physician.  Apparently, she had

some kind of respiratory issues several days ago, was given a course of antibiotics

by her primary care physician.  She denied worsening shortness of breath.  Tried

BiPAP without any relief.  She apparently failed, became unconscious and was

intubated. 



PAST MEDICAL HISTORY:  Previous extensive past medical history pertinent for

congestive heart failure, COPD, chronic pain, atrial fibrillation, and

hypothyroidism. 



PAST SURGICAL HISTORY:  Previous surgeries; hernia, gallbladder, appendix, hip,

spinal surgery hardware, and tonsillectomy.  Previous surgeries as otherwise

extensively outlined. 



ALLERGIES:  HYDRALAZINE.



MEDICATIONS:  Home medicine:

1. Oxycodone.

2. Zoloft 50.

3. Synthroid 50.

4. Morphine sulfate 60.

5. Gabapentin 600.

6. Advair.

7. Multaq 400.

8. Cardizem 30.

9. Aspirin. 



She is now started on;

1. Solu-Medrol.

2. Zithromax.

3. Ceftriaxone.

4. Neb treatments.



PHYSICAL EXAMINATION:

GENERAL:  She is otherwise awake, alert, and responsive on the vent.  Nods

appropriately.  Moves all 4 extremities. 

VITAL SIGNS:  Saturations are 98%, blood pressure 137/58, respiratory rate 18, and

pulse 80. 

CHEST:  Decreased breath sounds.  No wheezing. 

CARDIAC:  Normal S1 and S2.  No gallops. 

ABDOMEN:  No masses.



LABORATORY DATA:  White count 9000, H and H 10 and 31, and platelet count is normal.

 A pO2 is 60, pCO2 of __________ 5 of PEEP. 



X-ray shows bilateral infiltrates, improved.  Lytes are normal.  BNP is only 259.



IMPRESSION:  Chronic obstructive pulmonary disease exacerbation, bronchitis,

probably diastolic dysfunction, atrial fibrillation, and chronic pain, multiple

medication. 



PLAN:  Continue steroids and neb treatment. 



Hopefully, we can wean and extubate in the next 24 to 48 hours. 



This is a 45-minute critical care time.







Job ID:  [id number]

## 2019-08-24 NOTE — RAD
FRONTAL VIEW CHEST:

 

COMPARISON: 

Previous day.

 

INDICATION: 

ICU patient, followup.

 

FINDINGS: 

Endotracheal tube is present with tip in a stable position.  Persistent bilateral interstitial as wel
l as patchy bibasilar opacities remain.  Right subclavian venous catheter demonstrates similar config
uration.  Enteric catheter traverses to the central, mid abdomen.

 

IMPRESSION: 

Grossly stable chest.

 

POS: [facility]

## 2019-08-24 NOTE — RAD
EXAM: 

CHEST ONE VIEW



HISTORY:

Replacement of central line.



COMPARISON:

8/23/2019.



FINDINGS:

Endotracheal tube and nasogastric tubes remain in place. Tip of the nasogastric tube is unable to be 
visualized. The right subclavian central venous catheter has been withdrawn with the tip overlying

the medial right lung apex. The exact location of the central venous catheter is unable to be determi
aron based on this chest x-ray. Bibasilar interstitial and patchy densities are seen which could be

related to bibasilar pneumonia or atelectasis. There is elevation right hemidiaphragm. Suggestion of 
tiny bilateral pleural effusions. Vascular calcifications are seen in the thoracic aorta.

Vertebroplasty changes of multiple thoracic vertebral bodies are seen with postsurgical changes seen 
in lower thoracic spine.



IMPRESSION:



1. Central venous catheter on the right is seen. However, the location tip of the central venous cath
eter is unable to the determined on this examination, and the tip overlies the medial aspect of the

right lung apex. This finding was discussed with Terence, nurse in charge of the patient's care in the [facility] on 8/23/2019 at 0042 hours.

2. Increased bibasilar patchy and interstitial densities which could be related to bibasilar atelecta
sis, but bibasilar pneumonia is also a possibility.



Reported By: Jhonny Johnston 

Electronically Signed:  8/24/2019 12:44 AM

## 2019-08-24 NOTE — CT
PRELIMINARY REPORT/[facility]/EMERGENCY AFTER HOURS PROCEDURE:

 

EXAM: CT Abdomen and Pelvis With Contrast

 

EXAM DATE/TIME: 8/24/2019 12:01 AM

 

CLINICAL HISTORY: 78 years old, female; Prior surgery; Patient HX: Er 1. Fever w/o source, eval for p
ossible infection. PT is a sepsis activation. Surgical history of hysterectomy, appendectomy, cholecy
stectomy, hernia repair.

 

TECHNIQUE:

Imaging protocol: Computed tomography images of the abdomen and pelvis with intravenous contrast.

 

COMPARISON: No relevant prior studies available.

 

FINDINGS:

ABDOMEN:

Bilateral pleural effusions are present with adjacent atelectasis and pulmonary fibrosis. Nonspecific
 posterior right basilar lung nodules are present. Lower thoracic vertebral kyphoplasty changes are p
resent.

Liver is unremarkable aside from a left lobe 18 mm cyst or hemangioma. Spleen, pancreas, right adrena
l and right kidney are unremarkable. There may be a left adrenal nodule on image 26 measuring nearly 
2 cm. Nonobstructive lower pole left renal stone is present.

No evidence of bowel obstruction, pneumoperitoneum or free abdominal fluid.

Atherosclerotic change present in the aorta, without aneurysm.

No abnormal peripherally enhancing fluid collection.

PELVIS:

Appendix is surgically absent.

Nicolas catheter is present within the bladder.

Lower pelvis cannot be evaluated because of orthopedic hardware.

Diverticular changes are present within the colon without inflammation.

Postsurgical changes in the left hemipelvis and lumbar spine

 

IMPRESSION:

No acute surgical or inflammatory intra-abdominal or pelvic process.

Bilateral pleural effusions with adjacent atelectasis.

No explanation for sepsis.

 

Thank you for allowing us to participate in the care of your patient.

Dictated and Authenticated by: Zhang Merino MD

08/24/2019 12:51 AM Central Time ([place] & [place])

 

 

 

FINAL REPORT 

 

CT ABDOMEN AND PELVIS WITH CONTRAST:

 

FINDINGS/IMPRESSION: 

Final report is in agreement with the above-provided preliminary interpretation.

 

CODE QA

 

POS: [facility]

## 2019-08-25 LAB
ALBUMIN SERPL BCG-MCNC: 3.3 G/DL (ref 3.4–4.8)
ALP SERPL-CCNC: 89 U/L (ref 40–150)
ALT SERPL W P-5'-P-CCNC: 28 U/L (ref 8–55)
ANALYZER IN CARDIO: (no result)
ANALYZER IN CARDIO: (no result)
ANION GAP SERPL CALC-SCNC: 13 MMOL/L (ref 10–20)
ANION GAP SERPL CALC-SCNC: 14 MMOL/L (ref 10–20)
AST SERPL-CCNC: 55 U/L (ref 5–34)
BASE EXCESS STD BLDA CALC-SCNC: 3.6 MEQ/L
BASE EXCESS STD BLDA CALC-SCNC: 5.2 MEQ/L
BASOPHILS # BLD AUTO: 0 THOU/UL (ref 0–0.2)
BASOPHILS NFR BLD AUTO: 0 % (ref 0–1)
BILIRUB SERPL-MCNC: 0.2 MG/DL (ref 0.2–1.2)
BUN SERPL-MCNC: 15 MG/DL (ref 9.8–20.1)
BUN SERPL-MCNC: 15 MG/DL (ref 9.8–20.1)
CA-I BLDA-SCNC: 1.12 MMOL/L (ref 1.12–1.3)
CA-I BLDA-SCNC: 1.21 MMOL/L (ref 1.12–1.3)
CALCIUM SERPL-MCNC: 8.9 MG/DL (ref 7.8–10.44)
CALCIUM SERPL-MCNC: 9.4 MG/DL (ref 7.8–10.44)
CHLORIDE SERPL-SCNC: 101 MMOL/L (ref 98–107)
CHLORIDE SERPL-SCNC: 103 MMOL/L (ref 98–107)
CO2 SERPL-SCNC: 27 MMOL/L (ref 23–31)
CO2 SERPL-SCNC: 28 MMOL/L (ref 23–31)
CREAT CL PREDICTED SERPL C-G-VRATE: 51 ML/MIN (ref 70–130)
CREAT CL PREDICTED SERPL C-G-VRATE: 56 ML/MIN (ref 70–130)
EOSINOPHIL # BLD AUTO: 0 THOU/UL (ref 0–0.7)
EOSINOPHIL NFR BLD AUTO: 0.2 % (ref 0–10)
GLOBULIN SER CALC-MCNC: 2.8 G/DL (ref 2.4–3.5)
GLUCOSE SERPL-MCNC: 144 MG/DL (ref 83–110)
GLUCOSE SERPL-MCNC: 181 MG/DL (ref 83–110)
HCO3 BLDA-SCNC: 29.5 MEQ/L (ref 22–28)
HCO3 BLDA-SCNC: 30.3 MEQ/L (ref 22–28)
HCT VFR BLDA CALC: 33 % (ref 36–47)
HCT VFR BLDA CALC: 41 % (ref 36–47)
HGB BLD-MCNC: 11.1 G/DL (ref 12–16)
HGB BLDA-MCNC: 11.2 G/DL (ref 12–16)
HGB BLDA-MCNC: 14 G/DL (ref 12–16)
LYMPHOCYTES # BLD: 0.6 THOU/UL (ref 1.2–3.4)
LYMPHOCYTES NFR BLD AUTO: 6.5 % (ref 21–51)
MCH RBC QN AUTO: 30.9 PG (ref 27–31)
MCV RBC AUTO: 94.4 FL (ref 78–98)
MONOCYTES # BLD AUTO: 0.4 THOU/UL (ref 0.11–0.59)
MONOCYTES NFR BLD AUTO: 3.9 % (ref 0–10)
NEUTROPHILS # BLD AUTO: 8.7 THOU/UL (ref 1.4–6.5)
NEUTROPHILS NFR BLD AUTO: 89.4 % (ref 42–75)
PCO2 BLDA: 47.2 MMHG (ref 35–45)
PCO2 BLDA: 49.2 MMHG (ref 35–45)
PH BLDA: 7.4 [PH] (ref 7.35–7.45)
PH BLDA: 7.43 [PH] (ref 7.35–7.45)
PLATELET # BLD AUTO: 278 THOU/UL (ref 130–400)
PO2 BLDA: 55.3 MMHG (ref 70–?)
PO2 BLDA: 72.2 MMHG (ref 70–?)
POTASSIUM BLD-SCNC: 3.84 MMOL/L (ref 3.7–5.3)
POTASSIUM BLD-SCNC: 3.84 MMOL/L (ref 3.7–5.3)
POTASSIUM SERPL-SCNC: 3.7 MMOL/L (ref 3.5–5.1)
POTASSIUM SERPL-SCNC: 3.8 MMOL/L (ref 3.5–5.1)
RBC # BLD AUTO: 3.58 MILL/UL (ref 4.2–5.4)
SODIUM SERPL-SCNC: 138 MMOL/L (ref 136–145)
SODIUM SERPL-SCNC: 140 MMOL/L (ref 136–145)
SPECIMEN DRAWN FROM PATIENT: (no result)
SPECIMEN DRAWN FROM PATIENT: (no result)
TROPONIN I SERPL DL<=0.01 NG/ML-MCNC: 0.05 NG/ML (ref ?–0.03)
WBC # BLD AUTO: 9.7 THOU/UL (ref 4.8–10.8)

## 2019-08-25 RX ADMIN — ALBUTEROL SULFATE PRN MG: 2.5 SOLUTION RESPIRATORY (INHALATION) at 23:01

## 2019-08-25 RX ADMIN — Medication SCH ML: at 09:53

## 2019-08-25 RX ADMIN — Medication SCH ML: at 20:45

## 2019-08-25 RX ADMIN — HYDROCODONE BITARTRATE AND ACETAMINOPHEN PRN TAB: 5; 325 TABLET ORAL at 15:03

## 2019-08-25 RX ADMIN — ALBUTEROL SULFATE PRN MG: 2.5 SOLUTION RESPIRATORY (INHALATION) at 23:00

## 2019-08-25 RX ADMIN — HYDROCODONE BITARTRATE AND ACETAMINOPHEN PRN TAB: 5; 325 TABLET ORAL at 21:57

## 2019-08-25 RX ADMIN — CEFTRIAXONE SCH MLS: 1 INJECTION, POWDER, FOR SOLUTION INTRAMUSCULAR; INTRAVENOUS at 05:08

## 2019-08-25 RX ADMIN — AZITHROMYCIN SCH MLS: 500 INJECTION, POWDER, LYOPHILIZED, FOR SOLUTION INTRAVENOUS at 01:08

## 2019-08-25 NOTE — PDOC.EVN
Event Note





- Event Note


Event Note: 


Paged by ICU around 2210 for elevated BP in the 200s systolic and patient 

shaking and anxious. 


Came up with Dr. Siddiqui to evaluate the patient. She was anxious appearing and 

in respiratory distress. Her pulse was in the 130s, O2 sat in the mid to high 

80s on 3-4L O2 by NC. She had diffuse rales and poor inspiratory air movement. 


Dr. Lazcano had just given an order for her to get xanax and she had just received 

a duoneb treatment about 20 minutes prior. 





Ordered stat ABG, CXR, EKG, Trop, d dimer, BMP, CBC. 





Shortly after this, made the decision to place the patient on BiPAP. Dr. Ballard was called and notified of this decision and she came up to evaluate 

the patient as well. 


The patient remained anxious and reported difficulty breathing while on the 

BiPAP, however her O2 sat improved to the low 90s. She was then given another 

breathing treatment and her sats improved to the mid 90s. 





The EKG showed sinus tachycardia with no significant ST changes. 





D dimer - 1.32


Trop 0.051





Will check BNP and trend trop. 


PE less likely 2/2 lovenox ppx and then eliquis throughout hospitalization. 

Will keep on differential if patient decompensates or no other source found.

## 2019-08-25 NOTE — RAD
FRONTAL VIEW CHEST:

 

COMPARISON: 

Previous day.

 

INDICATION: 

Daily patient followup.

 

FINDINGS: 

Slight interval advancement of endotracheal tube with tip approximating the right mainstem.  Bilatera
l interstitial, alveolar, and pleural-based opacities of the chest are again seen.  No significant in
terval change depicted.

 

IMPRESSION: 

1.  Endotracheal tube with tip approximating the proximal right mainstem.  Slight retraction may prov
e useful.

 

2.  Bilateral pulmonary parenchymal opacities as well as a pleural-based density.

 

POS: [facility]

## 2019-08-25 NOTE — PDOC.FM
- Subjective


Subjective: 





Pt still intubated, resting comfortably. Nurse reports good toleration of OG 

feeds, no acute events overnight. Pt currently tolerating CPAP on vent.





- Objective


Vital Signs & Weight: 


 Vital Signs (12 hours)











  Temp Pulse Resp BP Pulse Ox


 


 08/25/19 06:33   85   136/70 


 


 08/25/19 06:31   85  19   99


 


 08/25/19 06:00    19  


 


 08/25/19 04:00  97.8 F   17  


 


 08/25/19 02:28   85  13   100


 


 08/25/19 02:00    22 H  


 


 08/25/19 00:00    19  


 


 08/24/19 22:07   85   


 


 08/24/19 22:06   82  15   97


 


 08/24/19 22:00    20  


 


 08/24/19 20:00    15   100








 Weight











Admit Weight                   60.1 kg


 


Weight                         59.7 kg











 Most Recent Monitor Data











Heart Rate from ECG            94


 


NIBP                           136/70


 


NIBP BP-Mean                   92


 


Respiration from ECG           12


 


SpO2                           99














I&O: 


 











 08/23/19 08/24/19 08/25/19





 06:59 06:59 06:59


 


Intake Total  846.2 3596.3


 


Output Total  765 1025


 


Balance  81.2 2571.3











Result Diagrams: 


 08/25/19 03:43





 08/25/19 03:43





Phys Exam





- Physical Examination


Constitutional: NAD


sedated


HEENT: moist MMs


Neck: no nodes, supple


Respiratory: no rales, wheezing present


Cardiovascular: RRR, no significant murmur


Gastrointestinal: soft, non-tender


Musculoskeletal: pulses present


sedated


Skin: no rash, normal turgor





Dx/Plan


(1) COPD exacerbation


Code(s): J44.1 - CHRONIC OBSTRUCTIVE PULMONARY DISEASE W (ACUTE) EXACERBATION   

Status: Acute   





(2) Afib


Code(s): I48.91 - UNSPECIFIED ATRIAL FIBRILLATION   Status: Chronic   


Qualifiers: 


   Atrial fibrillation type: paroxysmal   Qualified Code(s): I48.0 - Paroxysmal 

atrial fibrillation   





(3) Chronic pain syndrome


Code(s): G89.4 - CHRONIC PAIN SYNDROME   Status: Chronic   





(4) Hypertension


Code(s): I10 - ESSENTIAL (PRIMARY) HYPERTENSION   Status: Chronic   


Qualifiers: 


   Hypertension type: essential hypertension   Qualified Code(s): I10 - 

Essential (primary) hypertension   





(5) PNA (pneumonia)


Code(s): J18.9 - PNEUMONIA, UNSPECIFIED ORGANISM   Status: Suspected   


Qualifiers: 


   Pneumonia type: due to unspecified organism   Laterality: bilateral 





- Plan


Plan: 





ARDS: with acute hypoxic hypercapneic respiratory failure


A- likely 2/2 copd exacerbation given lung exam findings and cxr findings. 

Repeat CXR from 8/24 showed some evidence of CAP


P- continue prednisone and duonebs


-likely extubation today


-rocephin azithromycin





CAP


A- febrile in field, unclear if this was after ketamine, otherwise no recurrent 

measured fever and nothing given for antipyretic. CXR suspicious for CAP as 

listed above.


P- will cont rocephin and azithromycin





HTN:


-labile bp on admission, will cont to monitor consider restarting meds





A fib


-cont home meds, cont eliquis





Chronic opiate dependence/abuse:


-hx of withdrawal on previous admission, restart home meds once off sedation





DVT PPX: Eliquis


Code Status: Full





Addendum - Attending





- Attending Attestation


Date/Time: 08/25/19 5838





I personally evaluated the patient and discussed the management with Dr. Shaw.


I agree with the History, Examination, Assessment and Plan documented above 

with any addition or exceptions noted below.


She is now extubated and resting comfortably.

## 2019-08-25 NOTE — PRG
DATE OF SERVICE:  08/25/2019



SUBJECTIVE:  This morning, she is awake, alert, responsive, no distress.  X-ray

shows relatively no acute infiltrates. 



OBJECTIVE:  VITAL SIGNS:  Pulse 85, blood pressure 130/70, and respiratory rate 18. 

CHEST:  Decreased breath sounds.  No wheezing. 

CARDIAC:  Normal S1 and S2.  No gallops. 

ABDOMEN:  No masses.



LABORATORY DATA:  PO2 of 72, pCO2 of 47.3 on 40%, rate of 10.  White count is

normal.  H and H unremarkable.  Electrolytes are normal. 



IMPRESSION:  

1. Respiratory failure.

2. Chronic obstructive pulmonary disease exacerbation.

3. Bronchitis.



PLAN:  They do not discontinue the steroids, low-dose for several days.  De-escalate

antibiotics.  PT supportive care. 



Baseline echocardiogram, assess LV function.  One-half hour of critical time.







Job ID:  [id number]

## 2019-08-25 NOTE — RAD
EXAM: 

CHEST ONE VIEW



HISTORY:

Shortness of breath



COMPARISON:

8/25/2019 at 0458 hours.



FINDINGS:

The endotracheal tube and nasogastric tubes have been removed. Cardiac silhouette is magnified by pro
jection. Pulmonary vasculature is mildly increased. There is been interval mild increase in

perihilar interstitial densities with mild patchy densities at each lung base. Findings may relate to
 asymmetric pulmonary edema or developing infectious process. A tiny left pleural effusion is

present. -Calcic is seen thoracic aorta. Vertebroplasty changes and postsurgical changes of the spine
 are noted. There is osteopenia. 



IMPRESSION:



1. Interval removal of the endotracheal tube and nasogastric tubes.

2. Increased interstitial opacities and mild patchy densities at each lung base which may be related 
to asymmetric pulmonary edema given short interval change.

3. Small left pleural effusion.

4. Osteopenia.



Reported By: Jhonny Johnston 

Electronically Signed:  8/25/2019 11:01 PM

## 2019-08-26 LAB
ALBUMIN SERPL BCG-MCNC: 3.5 G/DL (ref 3.4–4.8)
ALP SERPL-CCNC: 97 U/L (ref 40–150)
ALT SERPL W P-5'-P-CCNC: 39 U/L (ref 8–55)
ANALYZER IN CARDIO: (no result)
ANALYZER IN CARDIO: (no result)
ANION GAP SERPL CALC-SCNC: 16 MMOL/L (ref 10–20)
AST SERPL-CCNC: 82 U/L (ref 5–34)
BASE EXCESS STD BLDA CALC-SCNC: 3.2 MEQ/L
BASE EXCESS STD BLDA CALC-SCNC: 5.3 MEQ/L
BASOPHILS # BLD AUTO: 0 THOU/UL (ref 0–0.2)
BASOPHILS NFR BLD AUTO: 0.1 % (ref 0–1)
BICARBONATE (HCO3V): 35.8 MMOL/L (ref 22–28)
BILIRUB SERPL-MCNC: 0.2 MG/DL (ref 0.2–1.2)
BUN SERPL-MCNC: 17 MG/DL (ref 9.8–20.1)
CA-I BLDA-SCNC: 1.14 MMOL/L (ref 1.12–1.3)
CA-I BLDA-SCNC: 1.15 MMOL/L (ref 1.12–1.3)
CALCIUM SERPL-MCNC: 9.1 MG/DL (ref 7.8–10.44)
CHLORIDE SERPL-SCNC: 104 MMOL/L (ref 98–107)
CK MB SERPL-MCNC: 22.1 NG/ML (ref 0–6.6)
CO2 SERPL-SCNC: 24 MMOL/L (ref 23–31)
CREAT CL PREDICTED SERPL C-G-VRATE: 54 ML/MIN (ref 70–130)
EOSINOPHIL # BLD AUTO: 0.1 THOU/UL (ref 0–0.7)
EOSINOPHIL NFR BLD AUTO: 0.2 % (ref 0–10)
GLOBULIN SER CALC-MCNC: 3.1 G/DL (ref 2.4–3.5)
GLUCOSE SERPL-MCNC: 242 MG/DL (ref 83–110)
HCO3 BLDA-SCNC: 31.5 MEQ/L (ref 22–28)
HCO3 BLDA-SCNC: 32.1 MEQ/L (ref 22–28)
HCT VFR BLDA CALC: 41 % (ref 36–47)
HCT VFR BLDA CALC: 41 % (ref 36–47)
HGB BLD-MCNC: 13 G/DL (ref 12–16)
HGB BLD-MCNC: 13.1 G/DL (ref 12–16)
HGB BLDA-MCNC: 13.9 G/DL (ref 12–16)
HGB BLDA-MCNC: 14 G/DL (ref 12–16)
LYMPHOCYTES # BLD: 0.7 THOU/UL (ref 1.2–3.4)
LYMPHOCYTES NFR BLD AUTO: 2.6 % (ref 21–51)
MCH RBC QN AUTO: 31.1 PG (ref 27–31)
MCH RBC QN AUTO: 31.2 PG (ref 27–31)
MCV RBC AUTO: 95.8 FL (ref 78–98)
MCV RBC AUTO: 96.4 FL (ref 78–98)
MDIFF COMPLETE?: YES
MONOCYTES # BLD AUTO: 1.2 THOU/UL (ref 0.11–0.59)
MONOCYTES NFR BLD AUTO: 4 % (ref 0–10)
NEUTROPHILS # BLD AUTO: 26.5 THOU/UL (ref 1.4–6.5)
NEUTROPHILS NFR BLD AUTO: 93.1 % (ref 42–75)
O2 A-A PPRESDIFF RESPIRATORY: 272.57 MM[HG] (ref 0–20)
PCO2 BLDA: 56.6 MMHG (ref 35–45)
PCO2 BLDA: 65.3 MMHG (ref 35–45)
PH BLDA: 7.3 [PH] (ref 7.35–7.45)
PH BLDA: 7.37 [PH] (ref 7.35–7.45)
PLATELET # BLD AUTO: 422 THOU/UL (ref 130–400)
PLATELET # BLD AUTO: 451 THOU/UL (ref 130–400)
PO2 BLDA: 68.5 MMHG (ref 70–?)
PO2 BLDA: 73.6 MMHG (ref 70–?)
POTASSIUM BLD-SCNC: 3.76 MMOL/L (ref 3.7–5.3)
POTASSIUM BLD-SCNC: 4.15 MMOL/L (ref 3.7–5.3)
POTASSIUM SERPL-SCNC: 4.1 MMOL/L (ref 3.5–5.1)
RBC # BLD AUTO: 4.19 MILL/UL (ref 4.2–5.4)
RBC # BLD AUTO: 4.19 MILL/UL (ref 4.2–5.4)
SODIUM SERPL-SCNC: 140 MMOL/L (ref 136–145)
SPECIMEN DRAWN FROM PATIENT: (no result)
SPECIMEN DRAWN FROM PATIENT: (no result)
TROPONIN I SERPL DL<=0.01 NG/ML-MCNC: 1.82 NG/ML (ref ?–0.03)
WBC # BLD AUTO: 27 THOU/UL (ref 4.8–10.8)
WBC # BLD AUTO: 28.5 THOU/UL (ref 4.8–10.8)

## 2019-08-26 PROCEDURE — 0BH17EZ INSERTION OF ENDOTRACHEAL AIRWAY INTO TRACHEA, VIA NATURAL OR ARTIFICIAL OPENING: ICD-10-PCS | Performed by: INTERNAL MEDICINE

## 2019-08-26 PROCEDURE — 5A1955Z RESPIRATORY VENTILATION, GREATER THAN 96 CONSECUTIVE HOURS: ICD-10-PCS | Performed by: INTERNAL MEDICINE

## 2019-08-26 RX ADMIN — FENTANYL CITRATE SCH MLS: 50 INJECTION, SOLUTION INTRAMUSCULAR; INTRAVENOUS at 13:19

## 2019-08-26 RX ADMIN — Medication SCH ML: at 21:19

## 2019-08-26 RX ADMIN — FAMOTIDINE SCH MG: 10 INJECTION, SOLUTION INTRAVENOUS at 09:40

## 2019-08-26 RX ADMIN — CEFTRIAXONE SCH MLS: 1 INJECTION, POWDER, FOR SOLUTION INTRAMUSCULAR; INTRAVENOUS at 05:32

## 2019-08-26 RX ADMIN — FAMOTIDINE SCH MG: 10 INJECTION, SOLUTION INTRAVENOUS at 21:18

## 2019-08-26 RX ADMIN — Medication SCH ML: at 09:41

## 2019-08-26 RX ADMIN — DOCUSATE SODIUM 50 MG AND SENNOSIDES 8.6 MG SCH TAB: 8.6; 5 TABLET, FILM COATED ORAL at 21:18

## 2019-08-26 NOTE — RAD
CHEST 1 VIEW:



HISTORY:

Shortness of breath.



Comparison: 

8/25/2019 at 10:51 PM.



FINDINGS: 

Lines and tubes: Interval placement of an endotracheal tube, terminating beyond the clavicles. Nasoga
stric tube extends beyond the diaphragm. Distal tip is not seen.

Cardiac silhouette: Normal.

Aorta: Atherosclerosis.

Pulmonary vessels: Normal.

Costophrenic angles: Clear.

Lungs: Stable diffuse interstitial and alveolar opacities.

Pneumothorax: None.

Osseous abnormalities: Stable multilevel vertebroplasty.



IMPRESSION:

Interval placement of endotracheal and nasogastric tubes. Otherwise, no change.



Transcribed Date/Time: 8/26/2019 8:10 AM



Reported By: Nas Campbell 

Electronically Signed:  8/26/2019 11:10 AM

## 2019-08-26 NOTE — PRG
DATE OF SERVICE:  08/26/2019



SUBJECTIVE:  Nivia Eid is a 78-year-old female, who was extubated yesterday.

She became progressively more short of breath. 



She says she was uneasy.  I told her to give her Xanax, called the resident to see

the patient. 



Heart rate increased at 2 o'clock, BiPAP was started by the residents, at 7 o'clock

they called me, states she was having difficulty breathing.  Gases showed severe

respiratory acidosis, pCO2 of 65, pH of 7.30 with pCO2 of 73 on the BiPAP. 



She was intubated. 



Troponin is elevated.  BNP is 761.  Echo pending.  Glucose 241.  X-ray shows

bilateral pulmonary infiltrates. 



OBJECTIVE:  VITAL SIGNS:  Blood pressure 150/100, pulse 116, saturations 99% on the

vent, and respirations 18. 

CHEST:  Bilateral crackles. 

CARDIAC:  Normal S1 and S2.  No gallops. 

ABDOMEN:  No masses.



LABORATORY DATA:  White count 28,000, segs 93.  Lytes are normal.  Glucose 241.



IMAGING DATA:  X-ray shows bilateral pulmonary infiltrates.



ASSESSMENT:  

1. Respiratory failure.

2. Pneumonia.

3. Superimposed congestive heart failure.

4. Supraventricular tachycardia.

5. Hypothyroidism.

6. Severe deconditioning.



PLAN:  Antibiotic being adjusted.  Continue supportive care, PT, nutrition.  May

await results of the echo. 



One-half hour of critical care time.







Job ID:  [id number]

## 2019-08-26 NOTE — CON
DATE OF CONSULTATION:  



HISTORY OF PRESENT ILLNESS:  The patient is a 78-year-old woman, who presented with

dyspnea and respiratory failure.  The patient has a history of atrial fibrillation

and mild coronary artery disease.  In October of last year, she underwent a cardiac

catheterization, found to have normal left systolic function with mild coronary

artery disease.  The right coronary artery had two 20% lesions.  The LAD and LCX

were both free of significant disease.  The patient re-presented in January 2009

with chest pain.  She underwent another stress test, which revealed her to have

normal left ventricular ejection fraction of 81% with no evidence of ischemia.  The

patient was admitted with respiratory failure.  She has a history of COPD.  The

patient is now intubated and unable to give a coherent history. 



PAST MEDICAL HISTORY:  Significant for;,

1. CAD.

2. Atrial fibrillation.

3. Hypertension.

4. Hypothyroidism.

5. Carcinoid tumor.

6. Lung carcinoma.



PAST SURGICAL HISTORY:  Hernia surgery, hip replacement.



SOCIAL HISTORY:  Long history of tobacco abuse.



PHYSICAL EXAMINATION:

GENERAL:  Intubated woman, who is sedated with a blood pressure of 158/111. 

NECK:  No jugular venous distention. 

LUNGS:  Coarse breath sounds bilateral. 

HEART:  Regular rate and rhythm.  Normal S1 and S2.  1/6 systolic murmur. 

ABDOMEN:  Nondistended. 

EXTREMITIES:  Showed no edema.



LABORATORY DATA:  Her sodium was 140, potassium 4.1, chloride 104, bicarbonate 24,

BUN 17, creatinine 0.82.  CPK-MB was 22.1, troponin was 0.482.  BNP is 761.  Her

white blood cell count was 28.5, hemoglobin 13.0, hematocrit 40.5, and platelets

422. 



IMAGING STUDIES:  Her EKG reveals her to have normal sinus rhythm with Q-waves

suggestive of a possible anterior MI. 



IMPRESSION:  

1. Respiratory failure.

2. Congestive heart failure.

3. Myocardial infarction, probably type 2.

4. History of atrial fibrillation.

5. Chronic obstructive pulmonary disease.

6. Tobacco abuse. 



This patient developed respiratory failure.  She appears to be septic with a

markedly elevated white count and developed respiratory failure.  She has elevated a

small elevation of her troponin level of 1.8.  I suspect the patient has had a type

2 myocardial infarction.  The patient will be treated with aspirin lipid-lowering

medication.  I will check the patient's echocardiogram.  She appears to be in

congestive heart failure.  We will discontinue her Multaq.  We will start ACE

inhibitor therapy.  We will follow this patient with you throughout her

hospitalization. 



It is a critical care note.  Time is 30 minutes.







Job ID:  [id number]

## 2019-08-26 NOTE — PDOC.FM
- Subjective


Subjective: 





Patient became extremely anxious, dyspneic, & tachycardic overnight requiring 

her to be placed on Bipap. Tolerated Bipap well overnight but early this AM 

around 0640 residents were paged again to bedside for tachypnea and hypoxia on 

Bipap. Therefore, after consulting with pulmonology, the decision was made to re

-intubate the patient. Her consent was obtained prior to the procedure and her 

vitals improved significantly following intubation. 





- Objective


MAR Reviewed: Yes


Vital Signs & Weight: 


 Vital Signs (12 hours)











  Temp Pulse Resp BP Pulse Ox


 


 08/26/19 04:00  97.4 F L    


 


 08/26/19 02:15   96  26 H   99


 


 08/26/19 00:58   111 H   183/103 H 


 


 08/26/19 00:00  97.5 F L     92 L


 


 08/25/19 23:01   121 H  23 H   92 L


 


 08/25/19 23:00  98.2 F  121 H  22 H   92 L


 


 08/25/19 22:47   121 H   


 


 08/25/19 22:01   114 H  23 H   99


 


 08/25/19 20:00  97.7 F    


 


 08/25/19 19:40      98


 


 08/25/19 18:39   87  17   94 L








 Weight











Admit Weight                   60.1 kg


 


Weight                         60 kg











 Most Recent Monitor Data











Heart Rate from ECG            110


 


NIBP                           128/101


 


NIBP BP-Mean                   110


 


Respiration from ECG           30


 


SpO2                           88














I&O: 


 











 08/24/19 08/25/19 08/26/19





 06:59 06:59 06:59


 


Intake Total 846.2 3596.3 2046.1


 


Output Total 765 1085 2390


 


Balance 81.2 2511.3 -343.9











Result Diagrams: 


 08/26/19 02:28





 08/26/19 02:28


EKG Reviewed by me: Yes (sinus tachycardia from overnight)


Radiology Reviewed by me: Yes (increased interstitial opacities & mild patchy 

densities @ each lung base)





Phys Exam





- Physical Examination


moderate distress 2/2 increased work of breathing


HEENT: moist MMs, sclera anicteric


Neck: supple, full ROM


Respiratory: wheezing present


expiratory wheezing & inspriatory rhonchi heard throughout  


Cardiovascular: no significant murmur


tachycardic with a regular rhythm


Gastrointestinal: soft, non-tender, no distention, positive bowel sounds


Musculoskeletal: no edema


pulses difficult to palpate in B/L LEs & feet cool to touch


Neurological: non-focal, moves all 4 limbs


Psychiatric: normal affect


Deviation from normal: able to nod yes & no appropriately to answer questions 

but unable to speak 


due to respiratory distress


Skin: no rash, normal turgor


Deviation from normal: slightly delayed cap refill just over 2s





Dx/Plan


(1) COPD exacerbation


Code(s): J44.1 - CHRONIC OBSTRUCTIVE PULMONARY DISEASE W (ACUTE) EXACERBATION   

Status: Acute   





(2) Acute exacerbation of CHF (congestive heart failure)


Code(s): I50.9 - HEART FAILURE, UNSPECIFIED   Status: Suspected   





(3) Hypothyroidism


Code(s): E03.9 - HYPOTHYROIDISM, UNSPECIFIED   Status: Chronic   





(4) Afib


Code(s): I48.91 - UNSPECIFIED ATRIAL FIBRILLATION   Status: Chronic   


Qualifiers: 


   Atrial fibrillation type: paroxysmal   Qualified Code(s): I48.0 - Paroxysmal 

atrial fibrillation   





(5) CAD (coronary artery disease)


Code(s): I25.10 - ATHSCL HEART DISEASE OF NATIVE CORONARY ARTERY W/O ANG PCTRS 

  Status: Chronic   


Qualifiers: 


   Coronary Disease-Associated Artery/Lesion type: native artery   Native vs. 

transplanted heart: native heart   Associated angina: without angina   

Qualified Code(s): I25.10 - Atherosclerotic heart disease of native coronary 

artery without angina pectoris   





(6) COPD (chronic obstructive pulmonary disease)


Status: Chronic   


Qualifiers: 


   COPD type: COPD with acute exacerbation   Qualified Code(s): J44.1 - Chronic 

obstructive pulmonary disease with (acute) exacerbation   





(7) Chronic pain syndrome


Code(s): G89.4 - CHRONIC PAIN SYNDROME   Status: Chronic   





(8) Hypertension


Code(s): I10 - ESSENTIAL (PRIMARY) HYPERTENSION   Status: Chronic   


Qualifiers: 


   Hypertension type: essential hypertension   Qualified Code(s): I10 - 

Essential (primary) hypertension   





- Plan


Plan: 





78YOF with a PMH significant for COPD on 2L of O2 at home, HTN, paroxysmal A fib

, hypothyroidism and chronic pain syndrome who was brought in via EMS after 

being intubated in the field for SOB





Acute hypoxic hypercapneic respiratory failure 2/2 an acute on chronic COPD and/

or suspected CHF exacerbation


- Likely 2/2 COPD exacerbation given lung exam findings and cxr findings; 

however, cannot r/o a possible CHF exacerbation component with pulmonary edema 

noted on CXR from this AM & overnight. Also cannot r/o an infectious pneumonitis

/PNA as patient could have aspirated following her first intubation and 

extubation.


- Will continue prednisone and duonebs & consider broadening abx once again as 

patient is currently only receiving rocephin. 


- Repeat ABG s/p intubation pending.


- Will continue to monitor respiratory status closely and wean off of 

respiratory support as tolerated by the patient. Continue QD CXRs.





Suspected CAP vs. aspiration pneumonitis


- Febrile in field but has remained afebrile since admission. However, WBC 

elevated even more from yesterday at 28.5 this AM 


& procal 0.03. Most recent CXR notable for stable & diffuse alveolar & 

interstitial opacities which could be 2/2 pulmonary edema vs. infection. 


- Will continue rocephin and consider re-broadening abx coverage due to 

possibility of aspiration following extubation yesterday. Will also consider 

sputum cultures.


- PRN antipyretics.





Suspected CHF exacerbation


- No documented or reported h/o CHF but BNP ordered overnight elevated at ~700 

which was also higher than BNP with initial admission labs. CXR also concerning 

for volume overload. ECHO pending for this AM. Will consider de-escalating IVFs 

as BP is stable and continue ALBA lasix pending patient's response to a 40mg IV 

dose this AM.


- Will continue QD weights and strict I&Os.





HTN


- BP significantly elevated over hospital course thus far. Suspect improvement 

with sedation meds but will also continue PRN labetalol pending a home meds rec 

today. Will consider restarting home meds once med rec has been obtained.





A fib


-Aware, will continue home meds for rate control. Started on therapeutic 

lovenox overnight due to concern for a possible PE so holding eliquis for now. 





Chronic opiate dependence/abuse


-Aware, hx of withdrawal on previous admission. Will restart home meds once off 

sedation but consider PRN morphine while intubated and not tolerating PO.





Hypothyroidism


- Aware will continue home meds once tolerating PO. 





DVT PPX: Th Lovenox


GI PPx: famotidine


Abx: Rocephin day #3


IVFs: NS @ 50mLhr


Code Status: Full





Addendum - Attending





- Attending Attestation


Date/Time: 08/26/19 1151





I personally evaluated the patient and discussed the management with Dr. Perry.


I agree with and repeated the History, Examination, Assessment and Plan 

documented above with any addition or exceptions noted below.





Resp failure with likely acute on chronic hf with possible STEMI - reviewed ECG 

from overnight and there appears to be lateral JABIER with + TnI.  This has 

resolved on repeat.  Will d/w cardiology.  Otherwise daily sedation vacation, 

lung protective vent, TF, GI ppx and therapeutic lovenox.

## 2019-08-26 NOTE — PDOC.EVN
Event Note





- Event Note


Event Note: 





Nursing paged residents to patient's bedside at ~0640 due to reported increased 

work of breathing and hypoxia down to the low 80s on Bipap. On exam patient was 

tachypneic into the upper 20s and tachycardic into the 110s and unable to speak 

even in short phrases due to increased work of breathing. Pulmonology, Dr. Lazcano 

was updated who recommended we intubate the patient. Patient consented by 

nodding yes and was intubated with a 7.5 ET tube placed 21cm at the teeth after 

receiving 20mg Etomidate and 60mg rocuronuim IV. Correct placement was 

confirmed via a STAT portable CXR following the procedure which was reviewed by 

myself and the attending physician, Dr. Tracy Ballard. Sedation procotol re-

initiated & patient noted to be resting comfortably and was satting 95-95% on 

SIMV mode at 16 and 60% FiO2 s/p confirmation of tube placement.





Attending physician, Dr. Tracy Ballard was present throughout the entire 

procedure.

## 2019-08-27 LAB
ALBUMIN SERPL BCG-MCNC: 3 G/DL (ref 3.4–4.8)
ALP SERPL-CCNC: 76 U/L (ref 40–150)
ALT SERPL W P-5'-P-CCNC: 33 U/L (ref 8–55)
ANALYZER IN CARDIO: (no result)
ANION GAP SERPL CALC-SCNC: 13 MMOL/L (ref 10–20)
AST SERPL-CCNC: 52 U/L (ref 5–34)
BASE EXCESS STD BLDA CALC-SCNC: 1.9 MEQ/L
BASOPHILS # BLD AUTO: 0 THOU/UL (ref 0–0.2)
BASOPHILS NFR BLD AUTO: 0.1 % (ref 0–1)
BILIRUB SERPL-MCNC: 0.3 MG/DL (ref 0.2–1.2)
BUN SERPL-MCNC: 22 MG/DL (ref 9.8–20.1)
CA-I BLDA-SCNC: 1.17 MMOL/L (ref 1.12–1.3)
CALCIUM SERPL-MCNC: 8.3 MG/DL (ref 7.8–10.44)
CHLORIDE SERPL-SCNC: 105 MMOL/L (ref 98–107)
CO2 SERPL-SCNC: 25 MMOL/L (ref 23–31)
CREAT CL PREDICTED SERPL C-G-VRATE: 54 ML/MIN (ref 70–130)
EOSINOPHIL # BLD AUTO: 0 THOU/UL (ref 0–0.7)
EOSINOPHIL NFR BLD AUTO: 0.1 % (ref 0–10)
GLOBULIN SER CALC-MCNC: 2.6 G/DL (ref 2.4–3.5)
GLUCOSE SERPL-MCNC: 129 MG/DL (ref 83–110)
HCO3 BLDA-SCNC: 26.6 MEQ/L (ref 22–28)
HCT VFR BLDA CALC: 37 % (ref 36–47)
HGB BLD-MCNC: 11.4 G/DL (ref 12–16)
HGB BLDA-MCNC: 12.6 G/DL (ref 12–16)
LYMPHOCYTES # BLD: 1.2 THOU/UL (ref 1.2–3.4)
LYMPHOCYTES NFR BLD AUTO: 8.1 % (ref 21–51)
MCH RBC QN AUTO: 31.2 PG (ref 27–31)
MCV RBC AUTO: 94.9 FL (ref 78–98)
MONOCYTES # BLD AUTO: 0.6 THOU/UL (ref 0.11–0.59)
MONOCYTES NFR BLD AUTO: 3.9 % (ref 0–10)
NEUTROPHILS # BLD AUTO: 13.5 THOU/UL (ref 1.4–6.5)
NEUTROPHILS NFR BLD AUTO: 87.9 % (ref 42–75)
PCO2 BLDA: 41.6 MMHG (ref 35–45)
PH BLDA: 7.42 [PH] (ref 7.35–7.45)
PLATELET # BLD AUTO: 262 THOU/UL (ref 130–400)
PO2 BLDA: 68.1 MMHG (ref 70–?)
POTASSIUM BLD-SCNC: 4.91 MMOL/L (ref 3.7–5.3)
POTASSIUM SERPL-SCNC: 4.4 MMOL/L (ref 3.5–5.1)
RBC # BLD AUTO: 3.64 MILL/UL (ref 4.2–5.4)
SODIUM SERPL-SCNC: 139 MMOL/L (ref 136–145)
SPECIMEN DRAWN FROM PATIENT: (no result)
WBC # BLD AUTO: 15.3 THOU/UL (ref 4.8–10.8)

## 2019-08-27 RX ADMIN — FENTANYL CITRATE SCH MLS: 50 INJECTION, SOLUTION INTRAMUSCULAR; INTRAVENOUS at 12:50

## 2019-08-27 RX ADMIN — DOCUSATE SODIUM 50 MG AND SENNOSIDES 8.6 MG SCH TAB: 8.6; 5 TABLET, FILM COATED ORAL at 08:32

## 2019-08-27 RX ADMIN — FENTANYL CITRATE SCH MLS: 50 INJECTION, SOLUTION INTRAMUSCULAR; INTRAVENOUS at 00:04

## 2019-08-27 RX ADMIN — FAMOTIDINE SCH MG: 10 INJECTION, SOLUTION INTRAVENOUS at 20:45

## 2019-08-27 RX ADMIN — FAMOTIDINE SCH MG: 10 INJECTION, SOLUTION INTRAVENOUS at 08:29

## 2019-08-27 RX ADMIN — ASPIRIN 81 MG CHEWABLE TABLET SCH MG: 81 TABLET CHEWABLE at 08:33

## 2019-08-27 RX ADMIN — AMIODARONE HYDROCHLORIDE SCH MLS: 50 INJECTION, SOLUTION INTRAVENOUS at 04:00

## 2019-08-27 RX ADMIN — Medication SCH ML: at 21:15

## 2019-08-27 RX ADMIN — Medication SCH: at 08:35

## 2019-08-27 RX ADMIN — DOCUSATE SODIUM 50 MG AND SENNOSIDES 8.6 MG SCH TAB: 8.6; 5 TABLET, FILM COATED ORAL at 20:48

## 2019-08-27 NOTE — PDOC.FM
- Subjective


Subjective: 





Patient remained stable from a respiratory standpoint intubated & sedated 

overnight; however, went into a fib with RVR @ ~ 0300 and cardiology was 

notified. Was started on an amio drip which has kept her HR in the 110-120s 

since. Also received a 0.5 dose of IV digoxin. GCS score of 10 with spontaneous 

eye opening and responding to commands. 





- Objective


MAR Reviewed: Yes


Vital Signs & Weight: 


 Vital Signs (12 hours)











  Temp Pulse Resp BP Pulse Ox


 


 08/27/19 04:56   160 H   


 


 08/27/19 04:00  98.2 F   23 H  


 


 08/27/19 02:17   106 H   105/68 


 


 08/27/19 02:00    19  


 


 08/27/19 00:03     143/96 H 


 


 08/27/19 00:00  98.8 F   25 H  


 


 08/26/19 22:00    25 H  


 


 08/26/19 21:53   119 H   119/80 


 


 08/26/19 20:00    25 H   100


 


 08/26/19 19:00  98.8 F    


 


 08/26/19 18:31   118 H   141/95 H 








 Weight











Admit Weight                   60.1 kg


 


Weight                         57.8 kg











 Most Recent Monitor Data











Heart Rate from ECG            138


 


NIBP                           92/63


 


NIBP BP-Mean                   72


 


Respiration from ECG           25


 


SpO2                           100














I&O: 


 











 08/25/19 08/26/19 08/27/19





 06:59 06:59 06:59


 


Intake Total 3596.3 2046.1 1548.6


 


Output Total 1085 2440 748


 


Balance 2511.3 -393.9 800.6











Result Diagrams: 


 08/27/19 05:07





 08/27/19 05:07


EKG Reviewed by me: Yes


Radiology Reviewed by me: Yes (extensive B/L alveolar & interstitial 

parenchymal & effusion changes  )





Phys Exam





- Physical Examination


Constitutional: NAD


intubated & sedated


HEENT: moist MMs, sclera anicteric


Neck: supple


Respiratory: no wheezing


CTA in B/L upper lung fields with trace inspiratory rhonchi in lower lobes


Cardiovascular: no significant murmur


tachycardic w/ an irregularly irregular rhythm


Gastrointestinal: soft, no distention, positive bowel sounds


Musculoskeletal: no edema


pulses difficult to detect in B/L LEs


Neurological: non-focal, moves all 4 limbs


GCS score of 10


Psychiatric: normal affect


Deviation from normal: nods yes & no appropriately to questioning


Deviation from normal: Scabbed over/healing skin tear on L buttock & erythema 

over sacrum w/ ppx 


-: mepiplex in place; bruising to B/L UEs; cap refill < 3s; no rash





Dx/Plan


(1) COPD exacerbation


Code(s): J44.1 - CHRONIC OBSTRUCTIVE PULMONARY DISEASE W (ACUTE) EXACERBATION   

Status: Acute   





(2) Acute exacerbation of CHF (congestive heart failure)


Code(s): I50.9 - HEART FAILURE, UNSPECIFIED   Status: Suspected   





(3) Hypothyroidism


Code(s): E03.9 - HYPOTHYROIDISM, UNSPECIFIED   Status: Chronic   





(4) Afib


Code(s): I48.91 - UNSPECIFIED ATRIAL FIBRILLATION   Status: Acute   


Qualifiers: 


   Atrial fibrillation type: paroxysmal   Qualified Code(s): I48.0 - Paroxysmal 

atrial fibrillation   





(5) CAD (coronary artery disease)


Code(s): I25.10 - ATHSCL HEART DISEASE OF NATIVE CORONARY ARTERY W/O ANG PCTRS 

  Status: Chronic   


Qualifiers: 


   Coronary Disease-Associated Artery/Lesion type: native artery   Native vs. 

transplanted heart: native heart   Associated angina: without angina   

Qualified Code(s): I25.10 - Atherosclerotic heart disease of native coronary 

artery without angina pectoris   





(6) COPD (chronic obstructive pulmonary disease)


Status: Chronic   


Qualifiers: 


   COPD type: COPD with acute exacerbation   Qualified Code(s): J44.1 - Chronic 

obstructive pulmonary disease with (acute) exacerbation   





(7) Chronic pain syndrome


Code(s): G89.4 - CHRONIC PAIN SYNDROME   Status: Chronic   





(8) Hypertension


Code(s): I10 - ESSENTIAL (PRIMARY) HYPERTENSION   Status: Chronic   


Qualifiers: 


   Hypertension type: essential hypertension   Qualified Code(s): I10 - 

Essential (primary) hypertension   





(9) Type 2 AMI (acute myocardial infarction)


Code(s): I21.A1 - MYOCARDIAL INFARCTION TYPE 2   Status: Acute   





- Plan


Plan: 





78YOF with a PMH significant for COPD on 2L of O2 at home, mild CAD, HTN, 

paroxysmal A fib, hypothyroidism and chronic pain syndrome who was brought in 

via EMS after being intubated in the field for SOB.





Acute hypoxic hypercapneic respiratory failure 2/2 an acute on chronic COPD and/

or suspected CHF exacerbation


- Likely 2/2 COPD exacerbation given lung exam findings and cxr findings; 

however, newly developed CHF is also likely a contributing factor based on 

prelim. ECHO read at bedside yesterday with mild improvement in CXR this AM s/p 

IV lasix yesterday. In addition, cannot r/o an infectious pneumonitis as patient

's CXR also improved after switching to cefepime from rocephin yesterday.  


- Will continue IV steroids as well as ALBA duonebs & albuterol per pulmonology, 

appreciate recs. 


- Repeat ABG pending for this AM. Post-intubation ABG slightly improved from pre

-intubation yesterday.


- Will continue to monitor respiratory status closely and wean off of 

respiratory support as tolerated by the patient. Continue QD CXRs.





A fib with RVR


-Aware, amio gtt started overnight as patient's HR jumped into the 160 range s/

p a dig & dilt bolus push. Cards on board, appreciate recs. Maintaining rate 

between 110-120s on drip @ 1mg/minute earlier this AM but has since converted 

to normal sinus w/o any additional meds. Will continue to monitor rate closely 

& touch base with cards regarding CVA ppx as patient likely a poor candidate 

for OAC therapy with HAS-BLED score of 3 based on age, antiplatelet use and 

uncontrolled HTN upon admission. However, given the fact that patient is still 

smoking with newly developed CHF [id number] score also extremely high at 6. 

Will also defer to cards regarding plans for d/c drip and transitioning to oral 

meds.   





Suspected CAP vs. aspiration pneumonitis


- Febrile in field but has remained afebrile since admission. Switched to 

cefepime yesterday & WBC down to 15.3 this AM. Sputum Cx pending but prelim 

read shows few WBCs & no organisms & procal 0.06 yesterday AM, repeat pending 

for this AM. Most recent CXR notable for mildly improved diffuse alveolar & 

interstitial opacities. 


- PRN antipyretics.





Suspected CHF exacerbation


- BNP ~700 on 8/25 which was > than BNP with initial admission labs. CXR 

concerning for volume overload. ECHO read pending but prelim bedside read c/w 

HFrEF. Cards on board & started ACE-I therapy. Will continue ALBA lasix but 

increase to BID dosing today.


- Will continue QD weights and strict I&Os.





HTN


- BP in low-normal range since initiation of ACE-I yesterday but MAP has been > 

or equal to 65 w/o addition or need for pressors since BP has dropped. Will 

decrease vasotec to 0.625 Q8H per pulm recs. Cards also on board, appreciate 

recs. Will continue to monitor closely.





Type II MI


- Per cards patient likely suffered an anterior type II MI based on EKG changes 

and uptrending troponins. Will continue ASA & statin therapy per cards recs & 

consider de-escalating Th Lovenox.





Chronic opiate dependence/abuse


-Aware, hx of withdrawal on previous admission. Will restart home meds once off 

sedation but will continue PRN IV morphine for breakthrough agitation while 

intubated & sedated.





Hypothyroidism


- Aware, will continue home meds. 





Physical Deconditioning:


- PT and OT consulted. May consider CM consult as well as patient will likely 

require a rehab vs. SNF stay upon d/c pending her clinical course. 


- Will start tube feeds slowly per pulm recs today at ~20cc/hr





DVT PPX: Th Lovenox


GI PPx: famotidine


Abx: Cefepime 1g Q12HR, day #2


IVFs: NS @ 50mLhr


Code Status: Full





Addendum - Attending





- Attending Attestation


Date/Time: 08/27/19 1411





I personally evaluated the patient and discussed the management with Dr. Perry.


I agree with the History, Examination, Assessment and Plan documented above 

with any addition or exceptions noted below.





Likely acute HFrEF


-begin diuresis


-ACEI already added, consider bb when improved and may req mra in the future





Lung protective ventilation





Continue antibiotics would limit course if no growth





Continue amio gtt, cardioverted this AM





NSTEMI vs type 2 MI


-cards following

## 2019-08-27 NOTE — RAD
Exam:

Chest one view:



HISTORY:

Respiratory insufficiency



COMPARISON:

8/26/2019



FINDINGS:

Life support tubes remain in place and stable. Extensive bilateral interstitial and alveolar parenchy
mal changes and pleural effusion changes with cardiomegaly. Since postop changes of the lumbar

thoracic spine. No significant new process.



IMPRESSION:

Stable chest. Continued short-term follow-up.



Reported By: Carloz Metz 

Electronically Signed:  8/27/2019 7:45 AM

## 2019-08-27 NOTE — PRG
DATE OF SERVICE:  08/27/2019



SUBJECTIVE:  This morning, she is intubated, vented, and sedated.



OBJECTIVE:  VITAL SIGNS:  Blood pressure is low 90/80, pulse 120, respirations 
18.

I's and O's have been consistently sightly ahead. 

CHEST:  Bilateral rhonchi and crackles.  No wheezing. 

CARDIAC:  Sinus tach. 

ABDOMEN:  Soft.



LABORATORY DATA:  White count 15,000, H and H 11 and 34, platelet count 262.  
PO2 of

68, pCO2 of 41 ph 7.43 lytes are normal. 



Glucose 118.



ASSESSMENT:  Respiratory failure, congestive heart failure, chronic obstructive

pulmonary disease, pneumonia, severe deconditioning. 



PLAN:  Medicine being adjusted such that to maintain a systolic at least 100.

 nutrition, PT. 



One-half hour of critical time.







Job ID:  [id number]



MTDD

## 2019-08-28 LAB
ALBUMIN SERPL BCG-MCNC: 2.9 G/DL (ref 3.4–4.8)
ALP SERPL-CCNC: 71 U/L (ref 40–150)
ALT SERPL W P-5'-P-CCNC: 34 U/L (ref 8–55)
ANALYZER IN CARDIO: (no result)
ANION GAP SERPL CALC-SCNC: 15 MMOL/L (ref 10–20)
AST SERPL-CCNC: 60 U/L (ref 5–34)
BASE EXCESS STD BLDA CALC-SCNC: 3.6 MEQ/L
BASOPHILS # BLD AUTO: 0 THOU/UL (ref 0–0.2)
BASOPHILS NFR BLD AUTO: 0 % (ref 0–1)
BILIRUB SERPL-MCNC: 0.3 MG/DL (ref 0.2–1.2)
BUN SERPL-MCNC: 25 MG/DL (ref 9.8–20.1)
CA-I BLDA-SCNC: 1.12 MMOL/L (ref 1.12–1.3)
CALCIUM SERPL-MCNC: 8.3 MG/DL (ref 7.8–10.44)
CHLORIDE SERPL-SCNC: 104 MMOL/L (ref 98–107)
CO2 SERPL-SCNC: 26 MMOL/L (ref 23–31)
CREAT CL PREDICTED SERPL C-G-VRATE: 56 ML/MIN (ref 70–130)
EOSINOPHIL # BLD AUTO: 0 THOU/UL (ref 0–0.7)
EOSINOPHIL NFR BLD AUTO: 0.2 % (ref 0–10)
GLOBULIN SER CALC-MCNC: 2.8 G/DL (ref 2.4–3.5)
GLUCOSE SERPL-MCNC: 124 MG/DL (ref 83–110)
HCO3 BLDA-SCNC: 28.5 MEQ/L (ref 22–28)
HCT VFR BLDA CALC: 31 % (ref 36–47)
HGB BLD-MCNC: 10.4 G/DL (ref 12–16)
HGB BLDA-MCNC: 10.7 G/DL (ref 12–16)
LYMPHOCYTES # BLD: 1.1 THOU/UL (ref 1.2–3.4)
LYMPHOCYTES NFR BLD AUTO: 8.8 % (ref 21–51)
MAGNESIUM SERPL-MCNC: 1.8 MG/DL (ref 1.6–2.6)
MCH RBC QN AUTO: 31.7 PG (ref 27–31)
MCV RBC AUTO: 94.1 FL (ref 78–98)
MONOCYTES # BLD AUTO: 0.5 THOU/UL (ref 0.11–0.59)
MONOCYTES NFR BLD AUTO: 3.7 % (ref 0–10)
NEUTROPHILS # BLD AUTO: 11.1 THOU/UL (ref 1.4–6.5)
NEUTROPHILS NFR BLD AUTO: 87.3 % (ref 42–75)
PCO2 BLDA: 45 MMHG (ref 35–45)
PH BLDA: 7.42 [PH] (ref 7.35–7.45)
PLATELET # BLD AUTO: 200 THOU/UL (ref 130–400)
PO2 BLDA: 104.1 MMHG (ref 70–?)
POTASSIUM BLD-SCNC: 3.47 MMOL/L (ref 3.7–5.3)
POTASSIUM SERPL-SCNC: 4.4 MMOL/L (ref 3.5–5.1)
RBC # BLD AUTO: 3.28 MILL/UL (ref 4.2–5.4)
SODIUM SERPL-SCNC: 141 MMOL/L (ref 136–145)
SPECIMEN DRAWN FROM PATIENT: (no result)
WBC # BLD AUTO: 12.7 THOU/UL (ref 4.8–10.8)

## 2019-08-28 PROCEDURE — 5A2204Z RESTORATION OF CARDIAC RHYTHM, SINGLE: ICD-10-PCS | Performed by: INTERNAL MEDICINE

## 2019-08-28 RX ADMIN — AMIODARONE HYDROCHLORIDE SCH MLS: 50 INJECTION, SOLUTION INTRAVENOUS at 14:10

## 2019-08-28 RX ADMIN — DOCUSATE SODIUM 50 MG AND SENNOSIDES 8.6 MG SCH: 8.6; 5 TABLET, FILM COATED ORAL at 11:03

## 2019-08-28 RX ADMIN — Medication SCH ML: at 20:32

## 2019-08-28 RX ADMIN — FAMOTIDINE SCH MG: 10 INJECTION, SOLUTION INTRAVENOUS at 20:33

## 2019-08-28 RX ADMIN — FAMOTIDINE SCH MG: 10 INJECTION, SOLUTION INTRAVENOUS at 10:58

## 2019-08-28 RX ADMIN — DOCUSATE SODIUM 50 MG AND SENNOSIDES 8.6 MG SCH TAB: 8.6; 5 TABLET, FILM COATED ORAL at 20:34

## 2019-08-28 RX ADMIN — ASPIRIN 81 MG CHEWABLE TABLET SCH: 81 TABLET CHEWABLE at 11:03

## 2019-08-28 RX ADMIN — FENTANYL CITRATE SCH MLS: 50 INJECTION, SOLUTION INTRAMUSCULAR; INTRAVENOUS at 19:31

## 2019-08-28 RX ADMIN — FENTANYL CITRATE SCH MLS: 50 INJECTION, SOLUTION INTRAMUSCULAR; INTRAVENOUS at 01:47

## 2019-08-28 RX ADMIN — AMIODARONE HYDROCHLORIDE SCH MLS: 50 INJECTION, SOLUTION INTRAVENOUS at 04:48

## 2019-08-28 RX ADMIN — Medication SCH ML: at 11:04

## 2019-08-28 NOTE — OP
DATE OF PROCEDURE:  08/28/2019



PROCEDURE PERFORMED:  Electrical cardioversion. 



The patient was shocked with 200 joules synchronized electricity and 200 joules 
of

synchronized electricity.  The patient remained in atrial fibrillation. 



IMPRESSION:  Unsuccessful electrical cardioversion.







Job ID:  [id number]



MTDD

## 2019-08-28 NOTE — RAD
CHEST ONE VIEW:

 

INDICATIONS:

Daily CCU examination.  Intubation.

 

COMPARISON:

08/27/2019

 

FINDINGS:

ET tube and gastric catheter are unchanged.  Cardiomegaly with perihilar opacities and small bilatera
l pleural effusions persist.  No pneumothorax is evident.

 

IMPRESSION:

Stable examination.

 

POS: BH

## 2019-08-28 NOTE — PRG
DATE OF SERVICE:  08/28/2019



SUBJECTIVE:  Nivia Eid remains intubated in the vent on fentanyl at 100 mcg,

but she opens eyes.  Moves her extremities. 



OBJECTIVE:  VITAL SIGNS:  Blood pressure looked better 100/80, pulse 80,

respirations 18, and saturations 96%.  I's and O's have been consistently ahead. 

CHEST:  Bilateral rhonchi and crackles. 

CARDIAC:  Normal S1 and S2.  No gallops. 

ABDOMEN:  No masses.



LABORATORY DATA:  A pO2 is 104, pCO2 45%, pH 7.42 at rate of 16.  Lytes are normal.

AST 60.  X-ray still shows CHF findings. 



IMPRESSION AND PLAN:  Respiratory failure, congestive heart failure, superimposed

pneumonia, severe deconditioning, and chronic obstructive pulmonary disease. 



Try and minimize sedation.  Continue nutrition and PT. 



One-half hour of critical care time.







Job ID:  [id number]

## 2019-08-28 NOTE — PDOC.FM
- Subjective


Subjective: 





Patient had recurrent episodes of hypotension overnight so vasotec was held and 

propofol was stopped. Also became agitated and required a one-time dose of 

ativan and fentanyl was increased from 125mcg/hr to 150. HR fluctuated from 

150s to 60s on the amiodarone drip.  Remained rate controlled for duration of 

exam. Was resting comfortably and following most commands on exam. 





- Objective


MAR Reviewed: Yes


Vital Signs & Weight: 


 Vital Signs (12 hours)











  Temp Pulse Resp BP Pulse Ox


 


 08/28/19 06:00    16  


 


 08/28/19 05:45     118/82 


 


 08/28/19 04:00    16   99


 


 08/28/19 03:10   71  16   99


 


 08/28/19 03:00  98.6 F    


 


 08/28/19 02:00    16  


 


 08/28/19 00:00    16  


 


 08/27/19 23:57      99


 


 08/27/19 23:00  97.8 F    


 


 08/27/19 22:00    16  


 


 08/27/19 21:50   78  16   100


 


 08/27/19 21:45     118/82 


 


 08/27/19 20:00      100


 


 08/27/19 19:51    16  


 


 08/27/19 19:00  98.9 F    


 


 08/27/19 18:53   92  16   93 L








 Weight











Admit Weight                   60.1 kg


 


Weight                         59.1 kg











 Most Recent Monitor Data











Heart Rate from ECG            64


 


NIBP                           107/58


 


NIBP BP-Mean                   74


 


Respiration from ECG           16


 


SpO2                           100














I&O: 


 











 08/26/19 08/27/19 08/28/19





 06:59 06:59 06:59


 


Intake Total 2046.1 2316.0 2365.8


 


Output Total 2440 838 1790


 


Balance -393.9 1478.0 575.8











Result Diagrams: 


 08/28/19 03:32





 08/28/19 03:32


Radiology Reviewed by me: Yes (cardiomegaly w/ perihilar opacities & small B/L 

effusions, stable exam)





Phys Exam





- Physical Examination


Constitutional: NAD


HEENT: moist MMs, sclera anicteric


Neck: supple


Respiratory: no wheezing


Cardiovascular: no significant murmur, irregular


irregular rhythm and rate jumping from 70s to 100s during exam


Gastrointestinal: soft, non-tender, no distention, positive bowel sounds


Musculoskeletal: no edema


pulses difficult to palpate in B/L LEs


Neurological: non-focal, moves all 4 limbs


GCS score of 9, opens eyes to command and follows most other commands


Skin: no rash


Deviation from normal: cap refill <3s





Dx/Plan


(1) COPD exacerbation


Code(s): J44.1 - CHRONIC OBSTRUCTIVE PULMONARY DISEASE W (ACUTE) EXACERBATION   

Status: Acute   





(2) Acute exacerbation of CHF (congestive heart failure)


Code(s): I50.9 - HEART FAILURE, UNSPECIFIED   Status: Suspected   





(3) Hypothyroidism


Code(s): E03.9 - HYPOTHYROIDISM, UNSPECIFIED   Status: Chronic   





(4) Afib


Code(s): I48.91 - UNSPECIFIED ATRIAL FIBRILLATION   Status: Acute   


Qualifiers: 


   Atrial fibrillation type: paroxysmal   Qualified Code(s): I48.0 - Paroxysmal 

atrial fibrillation   





(5) CAD (coronary artery disease)


Code(s): I25.10 - ATHSCL HEART DISEASE OF NATIVE CORONARY ARTERY W/O ANG PCTRS 

  Status: Chronic   


Qualifiers: 


   Coronary Disease-Associated Artery/Lesion type: native artery   Native vs. 

transplanted heart: native heart   Associated angina: without angina   

Qualified Code(s): I25.10 - Atherosclerotic heart disease of native coronary 

artery without angina pectoris   





(6) COPD (chronic obstructive pulmonary disease)


Status: Chronic   


Qualifiers: 


   COPD type: COPD with acute exacerbation   Qualified Code(s): J44.1 - Chronic 

obstructive pulmonary disease with (acute) exacerbation   





(7) Chronic pain syndrome


Code(s): G89.4 - CHRONIC PAIN SYNDROME   Status: Chronic   





(8) Hypertension


Code(s): I10 - ESSENTIAL (PRIMARY) HYPERTENSION   Status: Chronic   


Qualifiers: 


   Hypertension type: essential hypertension   Qualified Code(s): I10 - 

Essential (primary) hypertension   





(9) Type 2 AMI (acute myocardial infarction)


Code(s): I21.A1 - MYOCARDIAL INFARCTION TYPE 2   Status: Acute   





- Plan


Plan: 





78YOF with a PMH significant for COPD on 2L of O2 at home, mild CAD, HTN, 

paroxysmal A fib, hypothyroidism and chronic pain syndrome who was brought in 

via EMS after being intubated in the field for SOB.





Acute hypoxic hypercapneic respiratory failure 2/2 an acute on chronic COPD and/

or suspected CHF exacerbation


- Likely 2/2 COPD exacerbation given lung exam findings and cxr findings; 

however, newly developed CHF is also likely a contributing factor based on 

preliminary ECHO read at bedside yesterday. Official read still pending. In 

addition, cannot r/o an infectious pneumonitis as patient's CXR stable from 

yesterday.  


- Will continue IV steroids as well as ALBA duonebs & albuterol per pulmonology, 

appreciate recs. 


- ABG today WNLs w/ exception of O2 of 104 & Bicarb of 28.5. 


- Will continue to monitor respiratory status closely and wean sedation today 

per pulm recs in anticipation of future extubation. Continue QD CXRs.





A fib with RVR


-Aware, patient converted for a few hours yesterday while on amio drip but went 

back into afib w/ RVR yesterday evening with rate up into the 130s. Remains on 

the drip and is currently NPO in anticipation of possible cardioversion today. 

In and out of afib currently but rate controlled on drip @ 0.5 mcg/min. Cards 

on board, appreciate recs. Will defer to cards for plans for CVA PPx but will 

continue Th Lovenox for now. Per chart review eliquis was d/c 2/2 recurrent 

falls in early June of this year.   





Suspected CAP vs. aspiration pneumonitis


- Febrile in field but has remained afebrile since 8/25. WBC count continues to 

downtrend. Sputum Cx negative @ 12 hours & procal stable at 0.07. CXR stable 

this AM from yesterday w/ persistent small B/L effusions. 


- PRN antipyretics.





Pulmonary edema 2/2 suspected CHF exacerbation


- BNP ~700 on 8/25 which was > than BNP with initial admission labs. CXR still 

concerning for volume overload despite 40mg PO & 20mg IV lasix in last 24 hours 

& UO of just over 1700mL in last 24 hours. ECHO read still pending. Will 

continue ALBA BID lasix but consider increasing dosing to 40mg today.


- Will continue QD weights and strict I&Os.





HTN


- Hypotensive overnight again so vasotec held & propofol stopped. BP currently 

stable. Will consider stopping vasotec for now. Will continue to monitor 

closely & wean sedation per cards recs.





Type II MI


- Per cards patient likely suffered an anterior type II MI based on EKG changes 

and uptrending troponins. Will continue ASA & statin therapy per cards recs as 

well as Th Lovenox while patient remains in & out of a fib.





Chronic opiate dependence/abuse


-Aware, hx of withdrawal on previous admission. Will restart home meds once off 

sedation but will continue PRN IV morphine for breakthrough agitation while 

intubated & sedated.





Hypothyroidism


- Aware, will continue home meds. 





hyperglycemia


- SSI remains in place should BG levels be >200. Goal to keep levels between 140

-180. Did reach goal yesterday after initiation of tube feeds but BG down to 

125 this AM since patient has been NPO since midnight. Will continue to monitor 

closely & consider adding D5 to fluids and/or changing hypoglycemia protocol 

parameters to remain within goal range in setting of acute illness.





Physical Deconditioning:


- PT and OT consulted. Will likely consult CM in the next day or so as patient 

will likely require a rehab vs. SNF stay upon d/c pending her clinical course. 


- Will resume tube feeds once cleared by cards per pulm & dieticians recs.





DVT PPX: Th Lovenox


GI PPx: famotidine


Abx: Cefepime 1g Q12HR, day #3


IVFs: NS @ 50mLhr


Code Status: Full





Addendum - Attending





- Attending Attestation


Date/Time: 08/28/19 0457





I personally evaluated the patient and discussed the management with Dr. Perry.


I agree with and repeated the History, Examination, Assessment and Plan 

documented above with any addition or exceptions noted below.





Patient I&S this morning.  Her lungs remain with extensive crackles in the 

bases.  





Afib with RVR/t2MI/acute HFrEF


-call for echo read


-I would increase statin if LFTs tolerate


-on amio and th lovenox, I believe cardiology is planning cardioversion today


-consider increasing lasix





Leukocytosis


-would plan on d/c antibiotics today vs at 5 days





D/c NS, initiate feeds and may get FW if necessary

## 2019-08-29 LAB
ALBUMIN SERPL BCG-MCNC: 3 G/DL (ref 3.4–4.8)
ALP SERPL-CCNC: 65 U/L (ref 40–150)
ALT SERPL W P-5'-P-CCNC: 27 U/L (ref 8–55)
ANALYZER IN CARDIO: (no result)
ANION GAP SERPL CALC-SCNC: 11 MMOL/L (ref 10–20)
AST SERPL-CCNC: 29 U/L (ref 5–34)
BASE EXCESS STD BLDA CALC-SCNC: 3.9 MEQ/L
BASOPHILS # BLD AUTO: 0 THOU/UL (ref 0–0.2)
BASOPHILS NFR BLD AUTO: 0 % (ref 0–1)
BILIRUB SERPL-MCNC: 0.3 MG/DL (ref 0.2–1.2)
BUN SERPL-MCNC: 22 MG/DL (ref 9.8–20.1)
CA-I BLDA-SCNC: 1.15 MMOL/L (ref 1.12–1.3)
CALCIUM SERPL-MCNC: 8.4 MG/DL (ref 7.8–10.44)
CHLORIDE SERPL-SCNC: 101 MMOL/L (ref 98–107)
CO2 SERPL-SCNC: 30 MMOL/L (ref 23–31)
CREAT CL PREDICTED SERPL C-G-VRATE: 56 ML/MIN (ref 70–130)
EOSINOPHIL # BLD AUTO: 0.1 THOU/UL (ref 0–0.7)
EOSINOPHIL NFR BLD AUTO: 1.1 % (ref 0–10)
GLOBULIN SER CALC-MCNC: 2.2 G/DL (ref 2.4–3.5)
GLUCOSE SERPL-MCNC: 100 MG/DL (ref 83–110)
HCO3 BLDA-SCNC: 28.4 MEQ/L (ref 22–28)
HCT VFR BLDA CALC: 35 % (ref 36–47)
HGB BLD-MCNC: 10.1 G/DL (ref 12–16)
HGB BLDA-MCNC: 11.8 G/DL (ref 12–16)
LYMPHOCYTES # BLD: 2 THOU/UL (ref 1.2–3.4)
LYMPHOCYTES NFR BLD AUTO: 17.4 % (ref 21–51)
MAGNESIUM SERPL-MCNC: 1.8 MG/DL (ref 1.6–2.6)
MCH RBC QN AUTO: 31.6 PG (ref 27–31)
MCV RBC AUTO: 94.8 FL (ref 78–98)
MONOCYTES # BLD AUTO: 0.7 THOU/UL (ref 0.11–0.59)
MONOCYTES NFR BLD AUTO: 5.5 % (ref 0–10)
NEUTROPHILS # BLD AUTO: 8.9 THOU/UL (ref 1.4–6.5)
NEUTROPHILS NFR BLD AUTO: 76 % (ref 42–75)
O2 A-A PPRESDIFF RESPIRATORY: 99.62 MM[HG] (ref 0–20)
PCO2 BLDA: 42.3 MMHG (ref 35–45)
PH BLDA: 7.45 [PH] (ref 7.35–7.45)
PLATELET # BLD AUTO: 205 THOU/UL (ref 130–400)
PO2 BLDA: 61.4 MMHG (ref 70–?)
POTASSIUM BLD-SCNC: 3.21 MMOL/L (ref 3.7–5.3)
POTASSIUM SERPL-SCNC: 3.5 MMOL/L (ref 3.5–5.1)
RBC # BLD AUTO: 3.18 MILL/UL (ref 4.2–5.4)
SODIUM SERPL-SCNC: 138 MMOL/L (ref 136–145)
SPECIMEN DRAWN FROM PATIENT: (no result)
WBC # BLD AUTO: 11.8 THOU/UL (ref 4.8–10.8)

## 2019-08-29 RX ADMIN — FAMOTIDINE SCH MG: 10 INJECTION, SOLUTION INTRAVENOUS at 10:17

## 2019-08-29 RX ADMIN — DOCUSATE SODIUM 50 MG AND SENNOSIDES 8.6 MG SCH TAB: 8.6; 5 TABLET, FILM COATED ORAL at 21:03

## 2019-08-29 RX ADMIN — FAMOTIDINE SCH MG: 10 INJECTION, SOLUTION INTRAVENOUS at 21:01

## 2019-08-29 RX ADMIN — DOCUSATE SODIUM 50 MG AND SENNOSIDES 8.6 MG SCH TAB: 8.6; 5 TABLET, FILM COATED ORAL at 09:52

## 2019-08-29 RX ADMIN — ALBUTEROL SULFATE PRN MG: 2.5 SOLUTION RESPIRATORY (INHALATION) at 12:31

## 2019-08-29 RX ADMIN — Medication SCH ML: at 09:54

## 2019-08-29 RX ADMIN — OXYCODONE HYDROCHLORIDE AND ACETAMINOPHEN SCH TAB: 5; 325 TABLET ORAL at 16:04

## 2019-08-29 RX ADMIN — Medication SCH ML: at 21:03

## 2019-08-29 RX ADMIN — ASPIRIN 81 MG CHEWABLE TABLET SCH MG: 81 TABLET CHEWABLE at 09:52

## 2019-08-29 RX ADMIN — AMIODARONE HYDROCHLORIDE SCH MLS: 50 INJECTION, SOLUTION INTRAVENOUS at 07:21

## 2019-08-29 RX ADMIN — FENTANYL CITRATE SCH MLS: 50 INJECTION, SOLUTION INTRAMUSCULAR; INTRAVENOUS at 12:12

## 2019-08-29 RX ADMIN — OXYCODONE HYDROCHLORIDE AND ACETAMINOPHEN SCH TAB: 5; 325 TABLET ORAL at 21:02

## 2019-08-29 NOTE — PDOC.FM
- Subjective


Subjective: 





Patient converted to NSR yesterday afternoon following 2 cardioversions & has 

remained in sinus while on amio gtt @ 0.5 mcg/min. Sedation increased from 50 

to 125 mcg/min due to patient breathing over ventilator. Otherwise, NAEO. 

Resting comfortably on exam and following commands.





- Objective


MAR Reviewed: Yes


Vital Signs & Weight: 


 Vital Signs (12 hours)











  Temp Pulse Resp BP Pulse Ox


 


 08/29/19 06:00    15  


 


 08/29/19 04:00    16   100


 


 08/29/19 03:22   64  10 L   100


 


 08/29/19 03:00  97.5 F L    


 


 08/29/19 02:00    13  


 


 08/29/19 00:00    11 L   100


 


 08/28/19 23:00  98.4 F    


 


 08/28/19 22:00    12  


 


 08/28/19 21:47   81  13   97


 


 08/28/19 20:34   82   132/71 


 


 08/28/19 20:00    14   100


 


 08/28/19 19:00  98.2 F    








 Weight











Admit Weight                   60.1 kg


 


Weight                         59 kg











 Most Recent Monitor Data











Heart Rate from ECG            70


 


NIBP                           111/59


 


NIBP BP-Mean                   76


 


Respiration from ECG           8


 


SpO2                           100














I&O: 


 











 08/27/19 08/28/19 08/29/19





 06:59 06:59 06:59


 


Intake Total 2316.0 2400.7 2009.4


 


Output Total 838 1790 2340


 


Balance 1478.0 610.7 -330.6











Result Diagrams: 


 08/29/19 03:33





 08/29/19 03:33


Radiology Reviewed by me: Yes (improvement in B/L pleural effusions compared to 

yesterday)





Phys Exam





- Physical Examination


Constitutional: NAD


HEENT: moist MMs, sclera anicteric


Neck: supple


Respiratory: wheezing present


diffuse expiratory wheezing noted 


Cardiovascular: RRR, no significant murmur


Gastrointestinal: soft, non-tender, no distention, positive bowel sounds


Musculoskeletal: no edema


pulses difficut to palpate in B/L UEs


Neurological: non-focal, moves all 4 limbs


GCS 9


Skin: no rash, normal turgor


Deviation from normal: multiple purpura over B/L UEs 2/2 multiple needle sticks





Dx/Plan


(1) COPD exacerbation


Code(s): J44.1 - CHRONIC OBSTRUCTIVE PULMONARY DISEASE W (ACUTE) EXACERBATION   

Status: Acute   





(2) Acute exacerbation of CHF (congestive heart failure)


Code(s): I50.9 - HEART FAILURE, UNSPECIFIED   Status: Suspected   





(3) Hypothyroidism


Code(s): E03.9 - HYPOTHYROIDISM, UNSPECIFIED   Status: Chronic   





(4) Afib


Code(s): I48.91 - UNSPECIFIED ATRIAL FIBRILLATION   Status: Acute   


Qualifiers: 


   Atrial fibrillation type: paroxysmal   Qualified Code(s): I48.0 - Paroxysmal 

atrial fibrillation   





(5) CAD (coronary artery disease)


Code(s): I25.10 - ATHSCL HEART DISEASE OF NATIVE CORONARY ARTERY W/O ANG PCTRS 

  Status: Chronic   


Qualifiers: 


   Coronary Disease-Associated Artery/Lesion type: native artery   Native vs. 

transplanted heart: native heart   Associated angina: without angina   

Qualified Code(s): I25.10 - Atherosclerotic heart disease of native coronary 

artery without angina pectoris   





(6) COPD (chronic obstructive pulmonary disease)


Status: Chronic   


Qualifiers: 


   COPD type: COPD with acute exacerbation   Qualified Code(s): J44.1 - Chronic 

obstructive pulmonary disease with (acute) exacerbation   





(7) Chronic pain syndrome


Code(s): G89.4 - CHRONIC PAIN SYNDROME   Status: Chronic   





(8) Hypertension


Code(s): I10 - ESSENTIAL (PRIMARY) HYPERTENSION   Status: Chronic   


Qualifiers: 


   Hypertension type: essential hypertension   Qualified Code(s): I10 - 

Essential (primary) hypertension   





(9) Type 2 AMI (acute myocardial infarction)


Code(s): I21.A1 - MYOCARDIAL INFARCTION TYPE 2   Status: Acute   





- Plan


Plan: 





78YOF with a PMH significant for COPD on 2L of O2 at home, mild CAD, HTN, 

paroxysmal A fib, hypothyroidism and chronic pain syndrome who was brought in 

via EMS after being intubated in the field for SOB.





Acute hypoxic hypercapneic respiratory failure 2/2 an acute on chronic COPD and/

or CHF exacerbation


- Likely 2/2 COPD exacerbation given lung exam findings and cxr findings.  Will 

continue IV steroids but QD rather than BID & de-escalate ALBA duonebs & PRN 

albuterol per pulmonology & as tolerated by the patient. 


- For CHF exacerbation, (ECHO from 8/26 showed an EF of 10-15%) Will continue 

ALBA BID lasix as CXR improved s/p diuresis w/ BID IV lasix yesterday. Continue 

QD weights and strict I&Os. D/C IVFs.


- For possible infection/PNA, sputum Cx + only for candida so severe bacterial 

PNA highly unlikely as procal has also been negative since admission. Will 

consider stopping abx today or after a five day course. Day #4 of cefepime 

today.


- ABG pending for today & will adjust vent accordingly based on results. Will 

possibly attempt extubation later today & will consider stopping QD CXRs as 

patient has been stable for the last several days & today's imaging is improved 

from yesterday's.





A fib with RVR, resolved


-Patient converted several hours after 2 cardioversions done yesterday and has 

been in NSR since. Currently stable on amio gtt @ 0.5 mcg/min. Cards on board, 

appreciate recs. Will defer to cards for plans for CVA PPx but will continue Th 

Lovenox for now. Per chart review eliquis was d/c 2/2 recurrent falls in early 

June of this year.   





HTN


- BP stable since patient converted back to NSR. Cards resumed ACE-I therapy 

with zestril @ 5mg BID. PRN labetalol also on board if SBP is > 180.





Type II MI


- Per cards patient likely suffered an anterior type II MI based on EKG changes 

and uptrending troponins. Will continue ASA & statin therapy per cards recs & 

consider de-escalating Th Lovenox now that patient is back in NSR.





Chronic opiate dependence/abuse


-Aware, hx of withdrawal on previous admission. Will restart home meds once off 

sedation but will continue PRN IV morphine for breakthrough agitation while 

intubated & sedated.





Hypothyroidism


- Aware, will continue home meds. 





hyperglycemia


- SSI remains in place should BG levels be >200. Goal to keep BG levels between 

140-180. Anticipate improvement today with resumption of tube feeds.  Will 

continue to monitor closely & consider changing hypoglycemia protocol 

parameters to remain within goal range in setting of acute illness.





Physical Deconditioning:


- PT and OT consulted as well as CM as patient will likely require a rehab vs. 

SNF stay upon d/c pending her clinical course. 


- PC also consulted to discuss goals of care as patient's prognosis remains 

poor even if extubated given severe decline in cardiac function.





DVT PPX: Th Lovenox


GI PPx: famotidine


Abx: Cefepime 1g Q12HR, day #4


IVFs: none


Code Status: Full





Addendum - Attending





- Attending Attestation


Date/Time: 08/29/19 3669





I personally evaluated the patient and discussed the management with Dr. Perry.


I agree with the History, Examination, Assessment and Plan documented above 

with any addition or exceptions noted below.





D/w cards concerning EF and possible cath.  Otherwise continue diuresis and 

plan for ventilator liberation later today pending SBT.

## 2019-08-29 NOTE — PRG
DATE OF SERVICE:  08/29/2019



SUBJECTIVE:  This morning, still sedated on the vent.



OBJECTIVE:  VITAL SIGNS:  Blood pressure is better 122/62, pulse 74, respiratory

rate 18, and saturations are 98%.  I's and O's have been negative. 

CHEST:  Decreased breath sounds.  No wheezing. 

CARDIAC:  Normal S1 and S2.  No gallops. 

ABDOMEN:  No masses.



LABORATORY DATA:  Lytes are normal.  BUN is 22.  A pO2 is 61, pCO2 is 40%, and pH of

7.45 on 30%, rate of 10.  White count 11,000, H and H 10 and 30, and platelet count

normal. 



IMPRESSION AND PLAN:  Congestive heart failure, cardiomyopathy, respiratory failure,

chronic obstructive pulmonary disease, and chronic pain. 



Try and minimize sedation.  Continue nutrition and PT.  Hopefully, we will try and

wean and extubate in the next 24 to 48 hours. 







Job ID:  [id number]

## 2019-08-29 NOTE — PQF
CLINICAL DOCUMENTATION IMPROVEMENT CLARIFICATION FORM:  ICD-10 Updated

PLEASE DO AN ADDENDUM TO THE PROGRESS NOTE WITH ANY DOCUMENTATION UPDATES OR 
ADDITIONS AND CARRY THROUGH TO DC SUMMARY.   THANK YOU.



DATE: 8/29/2019                          ATTN: Dr. Price/ Attending Dr. Savage



Please exercise your independent, professional judgment in responding to the 
clarification form. 

Clinical indicators are provided on the bottom of this form for your review



Please check appropriate box(es):

[  ] Sepsis due to: (Pneumonia) ______________

[  ] Sepsis due to other: _____________________ 

[  ] SIRS due to non-infectious process (please specify etiology) ____________

      [  ] with organ dysfunction     [  ] without organ dysfunction

[  ] Severe sepsis with acute organ dysfunction of: ______________

        (Examples: respiratory failure, )

[  ] Localized infection without sepsis

[  ] Other diagnosis ___________

[ x ] Unable to determine



In addition, please specify:

Present on Admission (POA):  [ x ] Yes             [  ] No             [  ] 
Unable to determine



For continuity of documentation, please document condition throughout progress 
notes and discharge summary.  Thank You.



CLINICAL INDICATORS - SIGNS / SYMPTOMS / LABS



H&P 8/23: VS:  /72    RR 17  

                       SIRS/ SEPSIS

                       Tachycardic, Tachypneic, TMax: 101.8



8/26 (Romano): She appears to be septic with a markedly elevated white count 
and 

                          developed respiratory failure. 



8/28 ([facility]) Respiratory failure, CHF, superimposed pneumonia, severe 
deconditioning, and COPD



8/29 PN: CXR improved s/p diuresis w/ bid lasix yesterday. 

              -For possible infection/PNA, sputum CX + only for candida so 
severe bacterial PNA highly unlikely 

                as procal has been negative since admission.



RISKS:

H&P 8/23: 79 yo  presents for acute respiratory failure intubated by ems in 
field. 

She has PMH of COPD , chronic  opiate dependence.



TREATMENT:

Order for Resp: 8/24 Vent Adult

MAR: Order 8/26: Cefepime 1 gm IV q 12 hr

________________________________________________





Thank you, 

Enriqueta



(This form is maintained as a part of the permanent medical record)

 2015 [id number]  All Rights Reserved

Enriqueta Walsh RN, BSN    [email or web address]    Office: [phone]

                                                              

 

[facility]

## 2019-08-29 NOTE — RAD
CHEST 1 VIEW:

 

INDICATION: 

Intubation.

 

COMPARISON: 

8/28/2019.

 

FINDINGS: 

Cardiomegaly and pulmonary vascular congestion and perihilar interstitial opacities persist.  Tiny bi
lateral pleural effusions persist.  ET tube and gastric catheter are unchanged.  No pneumothorax is e
vident.

 

IMPRESSION: 

Stable exam.

 

POS: [facility]

## 2019-08-29 NOTE — PDOC.PALF
Purpose of Conference: Discuss Goals of care, complex disease process, and 

resuscitative measures   





Care Providers Present: LAKEISHA Mejia RN Palliative Care RN, Akin Okeefe





Family Members Present: Paitent daughter





Meeting Comments: Patient daughter gave insight into her mothers personality 

and what she enjoyed in life.  Addressed trajectory of chronic disease processes

, continued decline, and possible limited outcomes. Therapeutic listening to 

daughter. Patient birthday [birth year]





Goals of Care: 


*Continue current care, but understanding of potential to seek comfort 

measures. 


*Daughter hopes to speak to Cardiologist


*Prevent Suffering as per daughter 


*No resuscitative measures


*Revisit with daughter and readdress goals of care with the potential of 

comfort measures should decline continue





Summary: 


As stated in goals. Patient to become a DNAR, will revisit progress or lack of 

with daughter and revisit what provides comfort for patient.





Total Time Spent with Family: 


90 min

## 2019-08-30 LAB
ANALYZER IN CARDIO: (no result)
ANION GAP SERPL CALC-SCNC: 10 MMOL/L (ref 10–20)
BASE EXCESS STD BLDA CALC-SCNC: 5 MEQ/L
BASOPHILS # BLD AUTO: 0 THOU/UL (ref 0–0.2)
BASOPHILS NFR BLD AUTO: 0 % (ref 0–1)
BUN SERPL-MCNC: 22 MG/DL (ref 9.8–20.1)
CA-I BLDA-SCNC: 1.16 MMOL/L (ref 1.12–1.3)
CALCIUM SERPL-MCNC: 8.8 MG/DL (ref 7.8–10.44)
CHLORIDE SERPL-SCNC: 100 MMOL/L (ref 98–107)
CO2 SERPL-SCNC: 31 MMOL/L (ref 23–31)
CREAT CL PREDICTED SERPL C-G-VRATE: 57 ML/MIN (ref 70–130)
EOSINOPHIL # BLD AUTO: 0.1 THOU/UL (ref 0–0.7)
EOSINOPHIL NFR BLD AUTO: 1 % (ref 0–10)
GLUCOSE SERPL-MCNC: 108 MG/DL (ref 83–110)
HCO3 BLDA-SCNC: 29.6 MEQ/L (ref 22–28)
HCT VFR BLDA CALC: 31 % (ref 36–47)
HGB BLD-MCNC: 11 G/DL (ref 12–16)
HGB BLDA-MCNC: 10.7 G/DL (ref 12–16)
LYMPHOCYTES # BLD: 2.5 THOU/UL (ref 1.2–3.4)
LYMPHOCYTES NFR BLD AUTO: 18.4 % (ref 21–51)
MAGNESIUM SERPL-MCNC: 1.8 MG/DL (ref 1.6–2.6)
MCH RBC QN AUTO: 31.9 PG (ref 27–31)
MCV RBC AUTO: 94.7 FL (ref 78–98)
MONOCYTES # BLD AUTO: 0.8 THOU/UL (ref 0.11–0.59)
MONOCYTES NFR BLD AUTO: 6 % (ref 0–10)
NEUTROPHILS # BLD AUTO: 10.1 THOU/UL (ref 1.4–6.5)
NEUTROPHILS NFR BLD AUTO: 74.6 % (ref 42–75)
O2 A-A PPRESDIFF RESPIRATORY: 143.22 MM[HG] (ref 0–20)
PCO2 BLDA: 43.5 MMHG (ref 35–45)
PH BLDA: 7.45 [PH] (ref 7.35–7.45)
PLATELET # BLD AUTO: 245 THOU/UL (ref 130–400)
PO2 BLDA: 87.6 MMHG (ref 70–?)
POTASSIUM BLD-SCNC: 3.7 MMOL/L (ref 3.7–5.3)
POTASSIUM SERPL-SCNC: 3.6 MMOL/L (ref 3.5–5.1)
RBC # BLD AUTO: 3.44 MILL/UL (ref 4.2–5.4)
SODIUM SERPL-SCNC: 137 MMOL/L (ref 136–145)
SPECIMEN DRAWN FROM PATIENT: (no result)
WBC # BLD AUTO: 13.5 THOU/UL (ref 4.8–10.8)

## 2019-08-30 PROCEDURE — 0BH18EZ INSERTION OF ENDOTRACHEAL AIRWAY INTO TRACHEA, VIA NATURAL OR ARTIFICIAL OPENING ENDOSCOPIC: ICD-10-PCS | Performed by: INTERNAL MEDICINE

## 2019-08-30 PROCEDURE — 5A1955Z RESPIRATORY VENTILATION, GREATER THAN 96 CONSECUTIVE HOURS: ICD-10-PCS | Performed by: INTERNAL MEDICINE

## 2019-08-30 RX ADMIN — AMIODARONE HYDROCHLORIDE SCH MLS: 50 INJECTION, SOLUTION INTRAVENOUS at 13:27

## 2019-08-30 RX ADMIN — Medication SCH ML: at 09:12

## 2019-08-30 RX ADMIN — OXYCODONE HYDROCHLORIDE AND ACETAMINOPHEN SCH TAB: 5; 325 TABLET ORAL at 08:43

## 2019-08-30 RX ADMIN — AMIODARONE HYDROCHLORIDE SCH MLS: 50 INJECTION, SOLUTION INTRAVENOUS at 00:11

## 2019-08-30 RX ADMIN — FAMOTIDINE SCH MG: 10 INJECTION, SOLUTION INTRAVENOUS at 08:41

## 2019-08-30 RX ADMIN — DOCUSATE SODIUM 50 MG AND SENNOSIDES 8.6 MG SCH: 8.6; 5 TABLET, FILM COATED ORAL at 21:52

## 2019-08-30 RX ADMIN — ASPIRIN 81 MG CHEWABLE TABLET SCH MG: 81 TABLET CHEWABLE at 08:43

## 2019-08-30 RX ADMIN — Medication SCH ML: at 21:53

## 2019-08-30 RX ADMIN — FAMOTIDINE SCH MG: 10 INJECTION, SOLUTION INTRAVENOUS at 21:52

## 2019-08-30 RX ADMIN — DOCUSATE SODIUM 50 MG AND SENNOSIDES 8.6 MG SCH TAB: 8.6; 5 TABLET, FILM COATED ORAL at 08:41

## 2019-08-30 NOTE — RAD
PORTABLE CHEST ONE VIEW:

8/30/19 at 8:34 p.m.

 

HISTORY: 

Respiratory failure.

 

FINDINGS/IMPRESSION: 

Comparison made with earlier exam of 8:33 a.m. from the same day.

 

The tip of the endotracheal tube is about 13 mm of above the level of the mauricio.  Nasogastric tube h
as been removed in the interim. No pneumothoraces are seen. Remainder of the exam is otherwise stable
. Postop changes in the spine again seen. 

 

POS: [facility]

## 2019-08-30 NOTE — RAD
CHEST 1 VIEW:

 

Date:  08/30/19

 

COMPARISON:  

08/29/19. 

 

HISTORY:  

Respiratory distress. Ventilated patient. 

 

FINDINGS:

Stable endotracheal tube, nasogastric tube. Stable configuration of cardiac silhouette. Diffuse inter
stitial and alveolar opacities in the lung parenchyma, slightly worsening. No pneumothorax. Stable at
herosclerosis. 

 

IMPRESSION: 

Worsening interstitial and alveolar opacification. 

 

 

POS: [facility]

## 2019-08-30 NOTE — PDOC.FM
- Subjective


Subjective: 





NAEO. Patient remained stable with exception of a few low BPs but never 

required pressors for support. Remains intubated and sedated and in NSR on Amio 

gtt. 





- Objective


MAR Reviewed: Yes


Vital Signs & Weight: 


 Vital Signs (12 hours)











  Temp Pulse Resp BP Pulse Ox


 


 08/30/19 06:00    16  


 


 08/30/19 04:00  98.5 F   15  


 


 08/30/19 02:12   80  10 L   99


 


 08/30/19 02:00    13  


 


 08/30/19 00:00  97.8 F   22 H  


 


 08/29/19 22:11   77  20   100


 


 08/29/19 22:00    13  


 


 08/29/19 21:02   86   105/57 L 


 


 08/29/19 20:00  98.3 F   17   100








 Weight











Admit Weight                   60.1 kg


 


Weight                         59.1 kg











 Most Recent Monitor Data











Heart Rate from ECG            86


 


NIBP                           98/56


 


NIBP BP-Mean                   70


 


Respiration from ECG           14


 


SpO2                           100














I&O: 


 











 08/28/19 08/29/19 08/30/19





 06:59 06:59 06:59


 


Intake Total 2400.7 2085.0 2460


 


Output Total 1790 2340 3075


 


Balance 610.7 -255.0 -615











Result Diagrams: 


 08/30/19 05:33





 08/30/19 05:33


Radiology Reviewed by me: Yes





Phys Exam





- Physical Examination


Constitutional: NAD


HEENT: moist MMs


Neck: supple


Respiratory: wheezing present


diffuse end expiratory wheezing and trace inspiratory rhonchi throughout


Cardiovascular: RRR, no significant murmur


Gastrointestinal: soft, non-tender, no distention, positive bowel sounds


Musculoskeletal: no edema


Neurological: moves all 4 limbs


Nodded yes to questioning but would not follow commands


Skin: no rash


Deviation from normal: frail with bruising to B/L UEs 2/2 IV sticks





Dx/Plan


(1) COPD exacerbation


Code(s): J44.1 - CHRONIC OBSTRUCTIVE PULMONARY DISEASE W (ACUTE) EXACERBATION   

Status: Acute   





(2) Acute exacerbation of CHF (congestive heart failure)


Code(s): I50.9 - HEART FAILURE, UNSPECIFIED   Status: Suspected   





(3) Hypothyroidism


Code(s): E03.9 - HYPOTHYROIDISM, UNSPECIFIED   Status: Chronic   





(4) Afib


Code(s): I48.91 - UNSPECIFIED ATRIAL FIBRILLATION   Status: Acute   


Qualifiers: 


   Atrial fibrillation type: paroxysmal   Qualified Code(s): I48.0 - Paroxysmal 

atrial fibrillation   





(5) CAD (coronary artery disease)


Code(s): I25.10 - ATHSCL HEART DISEASE OF NATIVE CORONARY ARTERY W/O ANG PCTRS 

  Status: Chronic   


Qualifiers: 


   Coronary Disease-Associated Artery/Lesion type: native artery   Native vs. 

transplanted heart: native heart   Associated angina: without angina   

Qualified Code(s): I25.10 - Atherosclerotic heart disease of native coronary 

artery without angina pectoris   





(6) COPD (chronic obstructive pulmonary disease)


Status: Chronic   


Qualifiers: 


   COPD type: COPD with acute exacerbation   Qualified Code(s): J44.1 - Chronic 

obstructive pulmonary disease with (acute) exacerbation   





(7) Chronic pain syndrome


Code(s): G89.4 - CHRONIC PAIN SYNDROME   Status: Chronic   





(8) Hypertension


Code(s): I10 - ESSENTIAL (PRIMARY) HYPERTENSION   Status: Chronic   


Qualifiers: 


   Hypertension type: essential hypertension   Qualified Code(s): I10 - 

Essential (primary) hypertension   





(9) Type 2 AMI (acute myocardial infarction)


Code(s): I21.A1 - MYOCARDIAL INFARCTION TYPE 2   Status: Acute   





- Plan


Plan: 





78YOF with a PMH significant for COPD on 2L of O2 at home, mild CAD, HTN, 

paroxysmal A fib, hypothyroidism and chronic pain syndrome who was brought in 

via EMS after being intubated in the field for SOB.





Acute hypoxic hypercapneic respiratory failure 2/2 an acute on chronic COPD and/

or CHF exacerbation


- Etiology like multifactorial including COPD & CHF exacerbations as well as 

infectious. Patient remains intubated & sedated with fentanyl & PRN ativan & 

morphine. Tolerated CPAP mode for a few hours yesterday before tiring out so no 

attempt to extubate was made. Will try CPAP again today and consider extubation 

based on how she tolerates CPAP. 


- Will continue IV steroids, ALBA & PRN duonebs & PRN albuterol. 


- Will continue lasix but transition to PO today as patient diuresed well 

yesterday & CXR was much improved. Continue QD weights and strict I&Os. 


- Will transition to PO abx for ~2 days today & stop cefepime per pulm recs.





A fib with RVR, resolved


- Patient has remained in NSR since 8/28 PM s/p 2 cardioversions but remains on 

amio gtt. Cards on board, appreciate recs. Will defer to cards for plans for 

CVA PPx but will continue Th Lovenox for now. Per chart review eliquis was d/c 2

/2 recurrent falls in early June of this year.   





HTN


- BP low overnight with MAP ranging from 55-88. Will consider de-escalating ACE-

I therapy to 2.5mg today since spironolactone was added in addition to lasix. 


- Will continue to monitor closely & initiate pressor support PRN. 





Type II MI


- Per cards patient likely suffered an anterior type II MI based on EKG changes 

and uptrending troponins. Will continue ASA & statin therapy per cards recs & 

consider de-escalating Th Lovenox now that patient is back in NSR.





Chronic opiate dependence/abuse


-Aware, hx of withdrawal on previous admission. Will restart home meds once off 

sedation but will continue PRN IV morphine for breakthrough agitation while 

intubated & sedated.





Hypothyroidism


- Aware, will continue home meds. 





hyperglycemia


- SSI remains in place should BG levels be >200. Goal to keep BG levels between 

140-180. Anticipate improvement today increased rate of tube feeds.  Will 

continue to monitor closely & consider changing hypoglycemia protocol 

parameters to remain within goal range in setting of acute illness.





Physical Deconditioning:


- PT and OT on board. 


- CM also consulted as patient will likely require placement if & when she is 

discharged as her prognosis still remains quite guarded. 


- PC also consulted also on board & code status was changed to DNAR yesterday.





DVT PPX: Th Lovenox


GI PPx: famotidine


Abx: Omnicef, day #1


IVFs: none


Code Status: DNAR





Addendum - Attending





- Attending Attestation


Date/Time: 08/30/19 1042





I personally evaluated the patient and discussed the management with Dr. Perry.


I agree with and repeated the History, Examination, Assessment and Plan 

documented above with any addition or exceptions noted below.





Patient recently extubated to HFNC.  Says she is glad to have the ETT out.  Her 

exam is largely unchanged.





Th lovenox and goal directed therapy for HF.  She will need bb and may need LV 

prior to d/c.  


PT/OT to see.

## 2019-08-30 NOTE — PRG
DATE OF SERVICE:  08/30/2019



SUBJECTIVE:  This morning, she is awake, alert, and responsive.



OBJECTIVE:  VITAL SIGNS:  Blood pressure is improved, 105/60; temperature 99; 
pulse

74; _; saturations are 100%.  I's and O's are negative. 

CHEST:  Minimal rhonchi and crackles. 

CARDIAC:  Normal S1 and S2.  No gallops. 

ABDOMEN:  No masses.



LABORATORY DATA:  Lytes are normal. 



PO2 of 87, pCO2 43 ph 7.43.  White count 13,000, H and H of 11 and 32, and 
platelet

count 245. 



ASSESSMENT:  

1. Respiratory failure.

2. Congestive heart failure.

3. Possibly superimposed pneumonia.



PLAN:  Continue present treatment, Zestril, Lasix, diuretics. 



PT.  Amiodarone.  Consider weaning and extubation.  Decided we will talk with 
her

family that once we extubate, not to re-intubate again. 



One-half hour of critical care time.







Job ID:  [id number]



MTDD

## 2019-08-31 LAB
ANALYZER IN CARDIO: (no result)
ANION GAP SERPL CALC-SCNC: 18 MMOL/L (ref 10–20)
BASE EXCESS STD BLDA CALC-SCNC: 11.9 MEQ/L
BUN SERPL-MCNC: 25 MG/DL (ref 9.8–20.1)
CA-I BLDA-SCNC: 1.15 MMOL/L (ref 1.12–1.3)
CALCIUM SERPL-MCNC: 10.1 MG/DL (ref 7.8–10.44)
CHLORIDE SERPL-SCNC: 94 MMOL/L (ref 98–107)
CO2 SERPL-SCNC: 33 MMOL/L (ref 23–31)
CREAT CL PREDICTED SERPL C-G-VRATE: 50 ML/MIN (ref 70–130)
DIGOXIN SERPL-MCNC: 1.03 NG/ML (ref 0.8–2)
GLUCOSE SERPL-MCNC: 129 MG/DL (ref 83–110)
HCO3 BLDA-SCNC: 36.3 MEQ/L (ref 22–28)
HCT VFR BLDA CALC: 44 % (ref 36–47)
HGB BLD-MCNC: 14.3 G/DL (ref 12–16)
HGB BLDA-MCNC: 14.8 G/DL (ref 12–16)
MAGNESIUM SERPL-MCNC: 1.9 MG/DL (ref 1.6–2.6)
MCH RBC QN AUTO: 31.4 PG (ref 27–31)
MCV RBC AUTO: 93.3 FL (ref 78–98)
MDIFF COMPLETE?: YES
O2 A-A PPRESDIFF RESPIRATORY: 85.72 MM[HG] (ref 0–20)
PCO2 BLDA: 45.5 MMHG (ref 35–45)
PH BLDA: 7.52 [PH] (ref 7.35–7.45)
PLATELET # BLD AUTO: 437 THOU/UL (ref 130–400)
PO2 BLDA: 71.3 MMHG (ref 70–?)
POTASSIUM BLD-SCNC: 3.71 MMOL/L (ref 3.7–5.3)
POTASSIUM SERPL-SCNC: 3.9 MMOL/L (ref 3.5–5.1)
RBC # BLD AUTO: 4.55 MILL/UL (ref 4.2–5.4)
SODIUM SERPL-SCNC: 141 MMOL/L (ref 136–145)
SPECIMEN DRAWN FROM PATIENT: (no result)
WBC # BLD AUTO: 23.5 THOU/UL (ref 4.8–10.8)

## 2019-08-31 RX ADMIN — ASPIRIN 81 MG CHEWABLE TABLET SCH MG: 81 TABLET CHEWABLE at 09:06

## 2019-08-31 RX ADMIN — Medication SCH ML: at 09:08

## 2019-08-31 RX ADMIN — DOCUSATE SODIUM 50 MG AND SENNOSIDES 8.6 MG SCH: 8.6; 5 TABLET, FILM COATED ORAL at 09:07

## 2019-08-31 RX ADMIN — CEFTRIAXONE SCH MLS: 1 INJECTION, POWDER, FOR SOLUTION INTRAMUSCULAR; INTRAVENOUS at 13:50

## 2019-08-31 RX ADMIN — FAMOTIDINE SCH MG: 10 INJECTION, SOLUTION INTRAVENOUS at 20:26

## 2019-08-31 RX ADMIN — AMIODARONE HYDROCHLORIDE SCH MLS: 50 INJECTION, SOLUTION INTRAVENOUS at 22:20

## 2019-08-31 RX ADMIN — NICARDIPINE HYDROCHLORIDE SCH MLS: 25 INJECTION INTRAVENOUS at 13:51

## 2019-08-31 RX ADMIN — DOCUSATE SODIUM 50 MG AND SENNOSIDES 8.6 MG SCH: 8.6; 5 TABLET, FILM COATED ORAL at 20:24

## 2019-08-31 RX ADMIN — FAMOTIDINE SCH MG: 10 INJECTION, SOLUTION INTRAVENOUS at 09:04

## 2019-08-31 RX ADMIN — AMIODARONE HYDROCHLORIDE SCH MLS: 50 INJECTION, SOLUTION INTRAVENOUS at 06:08

## 2019-08-31 RX ADMIN — Medication SCH ML: at 11:43

## 2019-08-31 NOTE — PRG
DATE OF SERVICE:  08/31/2019



Ms. Eid is a very unfortunate 79-year-old lady with a history of COPD and

heart failure, who is currently on a ventilator following an acute hypoxic

hypercapnic respiratory arrest.  Her prognosis remains quite guarded.  This morning,

her vital signs are stable and in fact, she is slightly hypertensive.  We will

continue to follow with the intensive care team and monitor her respiratory status.

She also has a history of atrial fibrillation with RVR, although now she has sinus

tachycardia.  She likewise has a history of coronary artery disease and as mentioned

above, COPD and heart failure. 







Job ID:  [id number]

## 2019-08-31 NOTE — PDOC.FM
- Subjective


Subjective: 





Pt was intubated overnight due to failure to maintain O2 sats. Otherwise, only 

issue was HTN per nursing. Pt is diuresing well.





- Objective


MAR Reviewed: Yes


Vital Signs & Weight: 


 Vital Signs (12 hours)











  Temp Pulse Resp BP Pulse Ox


 


 08/31/19 04:25   98   185/106 H 


 


 08/31/19 04:00  99.1 F   19  


 


 08/31/19 02:58   98   


 


 08/31/19 02:57   98  24 H   98


 


 08/31/19 02:00    18  


 


 08/31/19 00:12   104 H   192/114 H 


 


 08/31/19 00:00  99.3 F   22 H   96


 


 08/30/19 22:04   105 H   


 


 08/30/19 22:03   103 H  22 H   98


 


 08/30/19 22:00    18  


 


 08/30/19 21:51   103 H   183/121 H 


 


 08/30/19 20:41   100   163/93 H 


 


 08/30/19 20:30    22 H  


 


 08/30/19 19:18      100


 


 08/30/19 19:00  98.9 F    


 


 08/30/19 18:39   97  16   100








 Weight











Admit Weight                   60.1 kg


 


Weight                         59.1 kg











 Most Recent Monitor Data











Heart Rate from ECG            106


 


NIBP                           185/106


 


NIBP BP-Mean                   132


 


Respiration from ECG           27


 


SpO2                           97














I&O: 


 











 08/29/19 08/30/19 08/31/19





 06:59 06:59 06:59


 


Intake Total 2085.0 2460 371


 


Output Total 2340 3075 3550


 


Balance -255.0 -749 -6261











Result Diagrams: 


 08/30/19 05:33





 08/30/19 05:33





Phys Exam





- Physical Examination


Constitutional: NAD (GCS E3 V1T M5)


HEENT: moist MMs


No oral pharyngeal signs of thrush


Neck: no JVD


Respiratory: wheezing present (diffusely)


Cardiovascular: RRR, no significant murmur


Gastrointestinal: soft, non-tender, no distention, positive bowel sounds


Musculoskeletal: no edema, pulses present


Neurological: moves all 4 limbs


Skin: cap refill <2 seconds





Dx/Plan


(1) Hypothyroidism


Code(s): E03.9 - HYPOTHYROIDISM, UNSPECIFIED   Status: Chronic   





(2) Afib


Code(s): I48.91 - UNSPECIFIED ATRIAL FIBRILLATION   Status: Acute   


Qualifiers: 


   Atrial fibrillation type: paroxysmal   Qualified Code(s): I48.0 - Paroxysmal 

atrial fibrillation   





(3) COPD exacerbation


Code(s): J44.1 - CHRONIC OBSTRUCTIVE PULMONARY DISEASE W (ACUTE) EXACERBATION   

Status: Acute   





(4) CAD (coronary artery disease)


Code(s): I25.10 - ATHSCL HEART DISEASE OF NATIVE CORONARY ARTERY W/O ANG PCTRS 

  Status: Chronic   


Qualifiers: 


   Coronary Disease-Associated Artery/Lesion type: native artery   Native vs. 

transplanted heart: native heart   Associated angina: without angina   

Qualified Code(s): I25.10 - Atherosclerotic heart disease of native coronary 

artery without angina pectoris   





(5) COPD (chronic obstructive pulmonary disease)


Status: Chronic   


Qualifiers: 


   COPD type: COPD with acute exacerbation   Qualified Code(s): J44.1 - Chronic 

obstructive pulmonary disease with (acute) exacerbation   





(6) Hypertension


Code(s): I10 - ESSENTIAL (PRIMARY) HYPERTENSION   Status: Chronic   


Qualifiers: 


   Hypertension type: essential hypertension   Qualified Code(s): I10 - 

Essential (primary) hypertension   





(7) Acute exacerbation of CHF (congestive heart failure)


Code(s): I50.9 - HEART FAILURE, UNSPECIFIED   Status: Suspected   





- Plan


Plan: 





This is a 79 yo female with a pmh of COPD on 2 L at home, CAD, HTN, Afibn 

Hypothyroidism





Acute hypoxic hypercaptnic repsiratory failure 2/2 chronic COPD/CHF exacerbation


-Extubated early yesterday morning, intubated yesterday evening due to failure 

to maintain O2 sats on HFNC


-Continue IV steroids, Duonebs and albuterol 


-Continue PO lasix per tube unless unable to tolerate


-Net fluid balance for this hospitalization is 203ml, will continue to monitor, 

down 3 L in last 24 hr


-Continue omnicef





Afib with RVR


-Continue amiodarone drip


-Cardiology consulted, will appretiate their recommendations


-Currently on therapeutic lovenox, would consider changing or stopping based on 

family wishes and pt's progress





HTN


-Elevated BP overnight, will add carvedilol


-PRN labetalol


-On ACEi








Type II MI


-Continue ASA and statin


-Cardiology consulted as above





Chronic opioid use/dependence


-PRN morphine and sedation


-Will restart home meds when pt is more stable





Hypothyroidism


-Continue home meds





Hyperglycemia


-Hypoglycemia protocol in place


-SSI in place





Physical Deconditioning


-PT/OT


-CM for discharge planning

## 2019-08-31 NOTE — RAD
FRONTAL RADIOGRAPH CHEST:

 

Date:  08/31/19 

 

COMPARISON:  

08/30/19. 

 

HISTORY:  

Ventilated patient. 

 

FINDINGS:

There is a stable endotracheal tube, probably within 2.0 cm of the mauricio. There is a nasogastric tub
e extending into the left upper quadrant. There is evidence of prior spinal surgery and multilevel sp
inal kyphoplasty of thoracic spine. There is extensive coarse increased linear interstitial density t
hroughout both lungs with hazy air space disease in the perihilar regions and bilateral lung bases, l
eft greater than right, nonspecific and unchanged. 

 

IMPRESSION: 

No significant interval change. 

 

 

POS: OFF

## 2019-08-31 NOTE — PRG
DATE OF SERVICE:  08/31/2019



SUBJECTIVE:  Nivia Eid remains sedated for ventilation.  I met with her family.

 The daughter is the power of health care and informed me that she wanted to be the

point of contact. 



OBJECTIVE:  VITAL SIGNS:  Blood pressure is 112/66, heart rate 90, respiratory rates

in the 20s, oximetry is 100%. 

LUNGS:  Remarkable for equal breath sounds without wheezes. 

HEART:  Regular rhythm. 

ABDOMEN:  Soft. 

EXTREMITIES:  Without edema.



LABORATORY DATA:  White count 23.5, hemoglobin 14.3, platelets 437. 



Sodium 141, potassium 3.9, chloride 94, bicarb 33, BUN 25, creatinine 0.82.



IMPRESSION:  Respiratory failure associated with cardiomyopathy and chronic

obstructive pulmonary disease.  She has been intubated 3 times this admission.

Family was informed me that she would never want a tracheostomy and never want to go

to a long-term care facility.  They are willing to continue with supportive care

until sometime next week and then try weaning and extubation again.  We will

concentrate on blood pressure control sedation for now. 



Chest x-ray was reviewed today.  I see no significant change. 



She appears to be stable.  Family has informed me that they do want her to be a do

not resuscitate patient.  They do want to continue with our aggressive care measures

for now. 



CRITICAL CARE TIME:  30 minutes.







Job ID:  [id number]

## 2019-09-01 LAB
ANION GAP SERPL CALC-SCNC: 12 MMOL/L (ref 10–20)
BASOPHILS # BLD AUTO: 0 THOU/UL (ref 0–0.2)
BASOPHILS NFR BLD AUTO: 0.1 % (ref 0–1)
BUN SERPL-MCNC: 30 MG/DL (ref 9.8–20.1)
CALCIUM SERPL-MCNC: 9.8 MG/DL (ref 7.8–10.44)
CHLORIDE SERPL-SCNC: 93 MMOL/L (ref 98–107)
CO2 SERPL-SCNC: 37 MMOL/L (ref 23–31)
CREAT CL PREDICTED SERPL C-G-VRATE: 42 ML/MIN (ref 70–130)
EOSINOPHIL # BLD AUTO: 0.1 THOU/UL (ref 0–0.7)
EOSINOPHIL NFR BLD AUTO: 0.3 % (ref 0–10)
GLUCOSE SERPL-MCNC: 133 MG/DL (ref 83–110)
HGB BLD-MCNC: 13.8 G/DL (ref 12–16)
LYMPHOCYTES # BLD: 1.1 THOU/UL (ref 1.2–3.4)
LYMPHOCYTES NFR BLD AUTO: 6.4 % (ref 21–51)
MCH RBC QN AUTO: 31.8 PG (ref 27–31)
MCV RBC AUTO: 94.7 FL (ref 78–98)
MONOCYTES # BLD AUTO: 0.7 THOU/UL (ref 0.11–0.59)
MONOCYTES NFR BLD AUTO: 4.2 % (ref 0–10)
NEUTROPHILS # BLD AUTO: 14.8 THOU/UL (ref 1.4–6.5)
NEUTROPHILS NFR BLD AUTO: 89 % (ref 42–75)
PLATELET # BLD AUTO: 406 THOU/UL (ref 130–400)
POTASSIUM SERPL-SCNC: 3.8 MMOL/L (ref 3.5–5.1)
RBC # BLD AUTO: 4.36 MILL/UL (ref 4.2–5.4)
SODIUM SERPL-SCNC: 138 MMOL/L (ref 136–145)
WBC # BLD AUTO: 16.6 THOU/UL (ref 4.8–10.8)

## 2019-09-01 RX ADMIN — FAMOTIDINE SCH MG: 10 INJECTION, SOLUTION INTRAVENOUS at 08:54

## 2019-09-01 RX ADMIN — ASPIRIN 81 MG CHEWABLE TABLET SCH MG: 81 TABLET CHEWABLE at 08:55

## 2019-09-01 RX ADMIN — FAMOTIDINE SCH MG: 10 INJECTION, SOLUTION INTRAVENOUS at 21:12

## 2019-09-01 RX ADMIN — AMIODARONE HYDROCHLORIDE SCH MLS: 50 INJECTION, SOLUTION INTRAVENOUS at 13:53

## 2019-09-01 RX ADMIN — DOCUSATE SODIUM 50 MG AND SENNOSIDES 8.6 MG SCH: 8.6; 5 TABLET, FILM COATED ORAL at 21:13

## 2019-09-01 RX ADMIN — NICARDIPINE HYDROCHLORIDE SCH MLS: 25 INJECTION INTRAVENOUS at 22:23

## 2019-09-01 RX ADMIN — DOCUSATE SODIUM 50 MG AND SENNOSIDES 8.6 MG SCH TAB: 8.6; 5 TABLET, FILM COATED ORAL at 08:53

## 2019-09-01 RX ADMIN — NICARDIPINE HYDROCHLORIDE SCH MLS: 25 INJECTION INTRAVENOUS at 11:47

## 2019-09-01 RX ADMIN — CEFTRIAXONE SCH MLS: 1 INJECTION, POWDER, FOR SOLUTION INTRAMUSCULAR; INTRAVENOUS at 13:55

## 2019-09-01 RX ADMIN — Medication SCH ML: at 08:57

## 2019-09-01 RX ADMIN — Medication SCH ML: at 21:14

## 2019-09-01 NOTE — RAD
FRONTAL RADIOGRAPH CHEST PORTABLE SUPINE:

 

Date:  09/01/19 

 

COMPARISON:  

08/31/19. 

 

HISTORY:  

Ventilated patient. 

 

FINDINGS:

Endotracheal tube and nasogastric tube are stable. There is increased density in the left base obscur
ing the left hemidiaphragm and blunting the left costophrenic angle suggesting nonspecific left basil
ar pleural and parenchymal opacity. Diffuse interstitial prominence and pulmonary vascular congestion
 has slightly improved when compared to 08/31/19. Multilevel kyphoplasty changes are noted. 

 

IMPRESSION: 

Persistent interstitial opacity and pleural/parenchymal opacity in the left base. Findings suggest pu
lmonary edema, improved. Infectious pneumonitis or aspiration cannot be excluded. Continued follow-up
 to full resolution advised. 

 

 

POS: OFF

## 2019-09-01 NOTE — PDOC.FM
- Subjective


Subjective: 





Pt had elevate BP overnight require nicardipine drip. Otherwise, night nurse 

reports no acute events overnight.





- Objective


MAR Reviewed: Yes


Vital Signs & Weight: 


 Vital Signs (12 hours)











  Temp Pulse Resp BP Pulse Ox


 


 09/01/19 04:00    27 H  


 


 09/01/19 03:00  98.3 F    


 


 09/01/19 02:31   87   


 


 09/01/19 02:30   83  17   96


 


 09/01/19 02:00    25 H  


 


 09/01/19 00:08   97   177/107 H 


 


 09/01/19 00:00    25 H  


 


 08/31/19 23:00  99.4 F    


 


 08/31/19 22:12   89   


 


 08/31/19 22:11   89  29 H   99


 


 08/31/19 22:00    26 H  


 


 08/31/19 20:26   89   137/77 


 


 08/31/19 20:00    29 H  


 


 08/31/19 19:45      100


 


 08/31/19 19:00  97.9 F    


 


 08/31/19 18:31   88  30 H   100


 


 08/31/19 18:00    29 H  








 Weight











Admit Weight                   60.1 kg


 


Weight                         55.1 kg











 Most Recent Monitor Data











Heart Rate from ECG            85


 


NIBP                           140/75


 


NIBP BP-Mean                   96


 


Respiration from ECG           13


 


SpO2                           97














I&O: 


 











 08/30/19 08/31/19 09/01/19





 06:59 06:59 06:59


 


Intake Total 2460 676 1557


 


Output Total 3075 3890 1920


 


Balance -615 -3214 -363











Result Diagrams: 


 09/01/19 03:49





 09/01/19 03:49





Phys Exam





- Physical Examination


Constitutional: NAD (GCS [id number], sedated at this time)


HEENT: PERRLA, oral pharynx no lesions (no signs of thrush)


Diffuse crackles, improved from yesterday


Cardiovascular: RRR, no significant murmur


Gastrointestinal: soft, non-tender, no distention


Musculoskeletal: pulses present, edema present (1+ in hands and feet)


Skin: cap refill <2 seconds





Dx/Plan


(1) Hypothyroidism


Code(s): E03.9 - HYPOTHYROIDISM, UNSPECIFIED   Status: Chronic   





(2) Afib


Code(s): I48.91 - UNSPECIFIED ATRIAL FIBRILLATION   Status: Acute   


Qualifiers: 


   Atrial fibrillation type: paroxysmal   Qualified Code(s): I48.0 - Paroxysmal 

atrial fibrillation   





(3) COPD exacerbation


Code(s): J44.1 - CHRONIC OBSTRUCTIVE PULMONARY DISEASE W (ACUTE) EXACERBATION   

Status: Acute   





(4) CAD (coronary artery disease)


Code(s): I25.10 - ATHSCL HEART DISEASE OF NATIVE CORONARY ARTERY W/O ANG PCTRS 

  Status: Chronic   


Qualifiers: 


   Coronary Disease-Associated Artery/Lesion type: native artery   Native vs. 

transplanted heart: native heart   Associated angina: without angina   

Qualified Code(s): I25.10 - Atherosclerotic heart disease of native coronary 

artery without angina pectoris   





(5) COPD (chronic obstructive pulmonary disease)


Status: Chronic   


Qualifiers: 


   COPD type: COPD with acute exacerbation   Qualified Code(s): J44.1 - Chronic 

obstructive pulmonary disease with (acute) exacerbation   





(6) Hypertension


Code(s): I10 - ESSENTIAL (PRIMARY) HYPERTENSION   Status: Chronic   


Qualifiers: 


   Hypertension type: essential hypertension   Qualified Code(s): I10 - 

Essential (primary) hypertension   





(7) Acute exacerbation of CHF (congestive heart failure)


Code(s): I50.9 - HEART FAILURE, UNSPECIFIED   Status: Suspected   





- Plan


Plan: 





This is a 77 yo female with a pmh of COPD on 2 L at home, CAD, HTN, Afibn 

Hypothyroidism





Acute hypoxic hypercaptnic repsiratory failure 2/2 chronic COPD/CHF exacerbation


-Remains intubated


-Continue IV steroids, Duonebs and albuterol 


-Continue PO lasix per tube unless unable to tolerate


-Net fluid balance for this hospitalization is -159ml, will continue to monitor


-Continue omnicef


-Pt is DNAR per Dr. [place]





Afib with RVR


-Continue amiodarone drip


-Cardiology consulted, will appretiate their recommendations


-Stopping lovenox and changing to prophylactic dosing.





HTN


-Elevated BP overnight, will increase carvedilol


-Pt started on nicardipine overnight for BP control


-PRN labetalol


-On ACEi





ДМИТРИЙ on CKD2


-Likely 2/2 to diuresis, will trend and consider holding ACEi if continues to 

worsen





Type II MI


-Continue ASA and statin


-Cardiology consulted as above


-Stopping therapeutic lovenox





Chronic opioid use/dependence


-PRN morphine and sedation


-Will restart home meds when pt is more stable





Hypothyroidism


-Continue home meds





Hyperglycemia


-Hypoglycemia protocol in place


-SSI in place





Physical Deconditioning


-PT/OT


-CM for discharge planning

## 2019-09-01 NOTE — PRG
DATE OF SERVICE:  09/01/2019



SUBJECTIVE:  Nivia Eid is sedated for ventilation.



OBJECTIVE:  GENERAL:  She is in no distress. 

VITAL SIGNS:  Heart rate is 83, blood pressure is 124/68, respiratory rates in the

20s, and volumes of 8 to 9 L a minute. 

LUNGS:  Remarkable for distant breath sounds with prolonged expiratory phase. 

HEART:  Regular rhythm. 

ABDOMEN:  Soft and nontender. 

EXTREMITIES:  Without edema.



LABORATORY DATA:  White count 16.6, hemoglobin 13.8, platelets 406,000.  Sodium 138,

potassium 3.8, chloride 93, bicarb 37, BUN 30, creatinine 0.97.  PH of 7.52, CO2 of

45, pO2 of 71 yesterday.  There is no blood gas today. 



IMPRESSION:  Respiratory failure associated with chronic obstructive pulmonary

disease and congestive heart failure. 



Her daughter has told me that she would not want a tracheostomy.  She has been

intubated 3 times this admission alone and her daughter feels she is tired and wants

to continue with supportive care and comfort measures probably with extubation on

Tuesday or Wednesday.  Her ejection fraction is 10% to 15%.  She has underlying

obstructive lung disease and has not tolerated weaning at all.  I intubated her

approximately 8 hours after she was extubated on Friday.  This is her third

intubation this admission.  We will continue with supportive care. 



CRITICAL CARE TIME:  30 minutes.







Job ID:  [id number]

## 2019-09-01 NOTE — PRG
DATE OF SERVICE:  09/01/2019



Ms. Eid is sedated on the ventilator.  The intensivist had a long discussion

with the family and they want her to be a do not resuscitate patient.  They,

however, would like to continue aggressive measures to the early to mid part of next

week at which time and an attempt will be made to extubate the patient. 







Job ID:  [id number]

## 2019-09-02 LAB
ANION GAP SERPL CALC-SCNC: 13 MMOL/L (ref 10–20)
BASOPHILS # BLD AUTO: 0 THOU/UL (ref 0–0.2)
BASOPHILS NFR BLD AUTO: 0.1 % (ref 0–1)
BUN SERPL-MCNC: 31 MG/DL (ref 9.8–20.1)
CALCIUM SERPL-MCNC: 9.5 MG/DL (ref 7.8–10.44)
CHLORIDE SERPL-SCNC: 96 MMOL/L (ref 98–107)
CO2 SERPL-SCNC: 33 MMOL/L (ref 23–31)
CREAT CL PREDICTED SERPL C-G-VRATE: 43 ML/MIN (ref 70–130)
EOSINOPHIL # BLD AUTO: 0 THOU/UL (ref 0–0.7)
EOSINOPHIL NFR BLD AUTO: 0.3 % (ref 0–10)
GLUCOSE SERPL-MCNC: 139 MG/DL (ref 83–110)
HGB BLD-MCNC: 14 G/DL (ref 12–16)
LYMPHOCYTES # BLD: 1.2 THOU/UL (ref 1.2–3.4)
LYMPHOCYTES NFR BLD AUTO: 6.6 % (ref 21–51)
MCH RBC QN AUTO: 31 PG (ref 27–31)
MCV RBC AUTO: 93.7 FL (ref 78–98)
MONOCYTES # BLD AUTO: 0.8 THOU/UL (ref 0.11–0.59)
MONOCYTES NFR BLD AUTO: 4.3 % (ref 0–10)
NEUTROPHILS # BLD AUTO: 16.7 THOU/UL (ref 1.4–6.5)
NEUTROPHILS NFR BLD AUTO: 88.7 % (ref 42–75)
PLATELET # BLD AUTO: 438 THOU/UL (ref 130–400)
POTASSIUM SERPL-SCNC: 4 MMOL/L (ref 3.5–5.1)
RBC # BLD AUTO: 4.51 MILL/UL (ref 4.2–5.4)
SODIUM SERPL-SCNC: 138 MMOL/L (ref 136–145)
WBC # BLD AUTO: 18.8 THOU/UL (ref 4.8–10.8)

## 2019-09-02 RX ADMIN — ASPIRIN 81 MG CHEWABLE TABLET SCH MG: 81 TABLET CHEWABLE at 09:03

## 2019-09-02 RX ADMIN — CEFTRIAXONE SCH MLS: 1 INJECTION, POWDER, FOR SOLUTION INTRAMUSCULAR; INTRAVENOUS at 14:08

## 2019-09-02 RX ADMIN — AMIODARONE HYDROCHLORIDE SCH MLS: 50 INJECTION, SOLUTION INTRAVENOUS at 12:28

## 2019-09-02 RX ADMIN — NICARDIPINE HYDROCHLORIDE SCH MLS: 25 INJECTION INTRAVENOUS at 09:46

## 2019-09-02 RX ADMIN — FAMOTIDINE SCH MG: 10 INJECTION, SOLUTION INTRAVENOUS at 09:04

## 2019-09-02 RX ADMIN — DOCUSATE SODIUM 50 MG AND SENNOSIDES 8.6 MG SCH: 8.6; 5 TABLET, FILM COATED ORAL at 20:56

## 2019-09-02 RX ADMIN — Medication SCH ML: at 09:36

## 2019-09-02 RX ADMIN — DOCUSATE SODIUM 50 MG AND SENNOSIDES 8.6 MG SCH: 8.6; 5 TABLET, FILM COATED ORAL at 09:35

## 2019-09-02 RX ADMIN — FAMOTIDINE SCH MG: 10 INJECTION, SOLUTION INTRAVENOUS at 20:46

## 2019-09-02 RX ADMIN — Medication SCH ML: at 20:46

## 2019-09-02 RX ADMIN — AMIODARONE HYDROCHLORIDE SCH MLS: 50 INJECTION, SOLUTION INTRAVENOUS at 06:31

## 2019-09-02 NOTE — PRG
DATE OF SERVICE:  09/02/2019



TIME SPENT:  35 minutes of critical care time.



SUBJECTIVE:  The patient remains intubated on mechanical ventilation.  Notes from

this weekend were reviewed.  Currently, she is sedated on propofol. 



OBJECTIVE:  VITAL SIGNS:  On exam, temperature is 98.9, pulse 93, and blood pressure

143/81.  A 24-hour intake 2177, output 3177.  Weight 112 pounds. 

HEENT:  Unremarkable. 

NECK:  No adenopathy or JVD. 

LUNGS:  Distant, but clear breath sounds. 

CARDIAC:  S1 and S2.  Irregularly irregular. 

ABDOMEN:  Soft and nontender. 

EXTREMITIES:  No edema.



LABORATORY DATA:  White blood cell count 18.8, hematocrit 42, and platelet count

438.  Sodium 138, potassium 4, chloride 96, CO2 of 33, BUN 31, creatinine 0.9, and

glucose 139. 



IMAGING DATA:  Chest x-ray showed no significant change.



ASSESSMENT:  

1. Acute respiratory failure secondary to chronic obstructive pulmonary disease.

2. Congestive heart failure.

3. Atrial fibrillation.



PLAN:  Respiratory rate was decreased today to 12.  She is getting bolused of

amiodarone.  I think the plan is to keep her intubated until midweek and make a

decision about terminal extubation at that time.  I do not see anything that needs

to be adjusted today. 







Job ID:  [id number]

## 2019-09-02 NOTE — PDOC.FM
- Subjective


Subjective: 





Patient remained stable on the ventilator and nicardipine, propofol, & amio 

drips overnight. Intubated & sedated on exam.





- Objective


MAR Reviewed: Yes


Vital Signs & Weight: 


 Vital Signs (12 hours)











  Temp Pulse Resp BP Pulse Ox


 


 09/02/19 04:00    25 H  


 


 09/02/19 03:59  99.1 F    


 


 09/02/19 02:20   91  26 H   98


 


 09/02/19 02:00    26 H  


 


 09/02/19 00:00  97.0 F L   25 H  


 


 09/01/19 22:12   78  21 H   97


 


 09/01/19 22:00    21 H  


 


 09/01/19 21:13   80   138/73 


 


 09/01/19 20:00    24 H  


 


 09/01/19 19:41      99


 


 09/01/19 19:00  98.1 F    


 


 09/01/19 18:26   81  23 H   99








 Weight











Admit Weight                   60.1 kg


 


Weight                         51.2 kg











 Most Recent Monitor Data











Heart Rate from ECG            95


 


NIBP                           123/72


 


NIBP BP-Mean                   89


 


Respiration from ECG           24


 


SpO2                           100














I&O: 


 











 08/31/19 09/01/19 09/02/19





 06:59 06:59 06:59


 


Intake Total 676 1557 2717


 


Output Total 2196 5220 6278


 


Balance -3214 -513 -460











Result Diagrams: 


 09/02/19 04:22





 09/02/19 04:22


Radiology Reviewed by me: Yes





Phys Exam





- Physical Examination


Constitutional: NAD


Remains intubated & sedated


HEENT: moist MMs


Respiratory: wheezing present


Cardiovascular: RRR, no significant murmur


Gastrointestinal: soft, non-tender, no distention, positive bowel sounds


Musculoskeletal: edema present (mild edema in B/L UEs but none in LEs)


Unable to assess 2/2 sedation


Skin: no rash, cap refill <2 seconds





Dx/Plan


(1) COPD exacerbation


Code(s): J44.1 - CHRONIC OBSTRUCTIVE PULMONARY DISEASE W (ACUTE) EXACERBATION   

Status: Acute   





(2) Acute exacerbation of CHF (congestive heart failure)


Code(s): I50.9 - HEART FAILURE, UNSPECIFIED   Status: Suspected   





(3) Hypothyroidism


Code(s): E03.9 - HYPOTHYROIDISM, UNSPECIFIED   Status: Chronic   





(4) Afib


Code(s): I48.91 - UNSPECIFIED ATRIAL FIBRILLATION   Status: Acute   


Qualifiers: 


   Atrial fibrillation type: paroxysmal   Qualified Code(s): I48.0 - Paroxysmal 

atrial fibrillation   





(5) CAD (coronary artery disease)


Code(s): I25.10 - ATHSCL HEART DISEASE OF NATIVE CORONARY ARTERY W/O ANG PCTRS 

  Status: Chronic   


Qualifiers: 


   Coronary Disease-Associated Artery/Lesion type: native artery   Native vs. 

transplanted heart: native heart   Associated angina: without angina   

Qualified Code(s): I25.10 - Atherosclerotic heart disease of native coronary 

artery without angina pectoris   





(6) COPD (chronic obstructive pulmonary disease)


Status: Chronic   


Qualifiers: 


   COPD type: COPD with acute exacerbation   Qualified Code(s): J44.1 - Chronic 

obstructive pulmonary disease with (acute) exacerbation   





(7) Chronic pain syndrome


Code(s): G89.4 - CHRONIC PAIN SYNDROME   Status: Chronic   





(8) Hypertension


Code(s): I10 - ESSENTIAL (PRIMARY) HYPERTENSION   Status: Chronic   


Qualifiers: 


   Hypertension type: essential hypertension   Qualified Code(s): I10 - 

Essential (primary) hypertension   





(9) Type 2 AMI (acute myocardial infarction)


Code(s): I21.A1 - MYOCARDIAL INFARCTION TYPE 2   Status: Acute   





- Plan


Plan: 





78YOF with a PMH significant for COPD on 2L of O2 at home, mild CAD, HTN, 

paroxysmal A fib, hypothyroidism and chronic pain syndrome who was brought in 

via EMS after being intubated in the field for SOB.





Acute hypoxic hypercapneic respiratory failure 2/2 a COPD/CHF exacerbation


- Remained stable overnight intubated & sedated.


- Will continue IV steroids, ALBA & PRN duonebs & PRN albuterol. 


- Will continue ALBA lasix as well as QD weights and strict I&Os. 





HTN


- Patient remains on nicardipine drip for BP control. Will wean as tolerated & 

continue to monitor BPs closely. 





ДМИТРИЙ on CKD2


-Slightly improved today. Likely 2/2 to diuresis, will trend and consider 

holding ACEi if continues to worsen.





A fib with RVR, resolved


- Patient has remained in NSR since 8/28 PM s/p 2 cardioversions but remains on 

amio gtt. Cards on board, appreciate recs. Will defer to cards for plans for 

CVA PPx but will continue Th Lovenox for now. Per chart review eliquis was d/c 2

/2 recurrent falls in early June of this year.   





Type II MI


- Per cards patient likely suffered an anterior type II MI based on EKG changes 

and uptrending troponins. Will continue ASA & statin therapy per cards recs & 

consider de-escalating Th Lovenox.





Chronic opiate dependence/abuse


-Aware, hx of withdrawal on previous admission. Will restart home meds once off 

sedation but will continue PRN IV morphine for breakthrough agitation while 

intubated & sedated.





Hypothyroidism


- Aware, will continue home meds. 





hyperglycemia


- SSI remains in place should BG levels be >200. Goal to keep BG levels between 

140-180. Hypoglycemia protocol also in place. 





Physical Deconditioning:


- PT and OT on board. 


- CM & PC aalso on board & code status was changed back to DNAR again 

yesterday. Plan is for extubation on Tuesday or Wednesday per Pulmonology. Will 

plan discuss goals of care with family again to confirm this. 





DVT PPX: Th Lovenox


GI PPx: famotidine


Abx: Rocephin


Code Status: DNAR





Addendum - Attending





- Attending Attestation


Date/Time: 09/02/19 8096





I personally evaluated the patient and discussed the management with Dr. Perry. 


I agree with the History, Examination, Assessment and Plan documented above 

with any addition or exceptions noted below.





Patient here for respiratory distress in the setting of severe sCHF and COPD. 

She continues to be intubated. Vital signs overall stable at this time. Her WBC 

is overall stable. Cardiology on board, patient is diuresing well and overall 

appears euvolemic on exam. Will need to continue to wean Nicardipine as it can 

depress the EF further. Family discussing possible move to comfort measures in 

the coming days if no significant improvement as they do want want to pursue 

trach.

## 2019-09-02 NOTE — RAD
CHEST 1 VIEW:

 

HISTORY: 

Respiratory insufficiency, ventilated patient.

 

COMPARISON: 

9/1/2019.

 

FINDINGS: 

Endotracheal tube in stable position.  Extensive postoperative and vertebroplasty changes of the thor
acolumbar spine with diffuse interstitial and linear opacity changes throughout both lungs with evide
nce for small pleural effusions.  No new confluent pneumonia.

 

IMPRESSION: 

Overall stable chest.

 

POS: [facility]

## 2019-09-03 LAB
ANION GAP SERPL CALC-SCNC: 14 MMOL/L (ref 10–20)
BUN SERPL-MCNC: 38 MG/DL (ref 9.8–20.1)
CALCIUM SERPL-MCNC: 9.6 MG/DL (ref 7.8–10.44)
CHLORIDE SERPL-SCNC: 98 MMOL/L (ref 98–107)
CO2 SERPL-SCNC: 31 MMOL/L (ref 23–31)
CREAT CL PREDICTED SERPL C-G-VRATE: 38 ML/MIN (ref 70–130)
GLUCOSE SERPL-MCNC: 133 MG/DL (ref 83–110)
HGB BLD-MCNC: 14.2 G/DL (ref 12–16)
MCH RBC QN AUTO: 31.1 PG (ref 27–31)
MCV RBC AUTO: 93.3 FL (ref 78–98)
MDIFF COMPLETE?: YES
PLATELET # BLD AUTO: 430 THOU/UL (ref 130–400)
POTASSIUM SERPL-SCNC: 4.2 MMOL/L (ref 3.5–5.1)
RBC # BLD AUTO: 4.55 MILL/UL (ref 4.2–5.4)
SODIUM SERPL-SCNC: 139 MMOL/L (ref 136–145)
WBC # BLD AUTO: 20.6 THOU/UL (ref 4.8–10.8)

## 2019-09-03 RX ADMIN — AMIODARONE HYDROCHLORIDE SCH MLS: 50 INJECTION, SOLUTION INTRAVENOUS at 13:42

## 2019-09-03 RX ADMIN — Medication SCH ML: at 21:37

## 2019-09-03 RX ADMIN — AMIODARONE HYDROCHLORIDE SCH MLS: 50 INJECTION, SOLUTION INTRAVENOUS at 01:35

## 2019-09-03 RX ADMIN — CEFTRIAXONE SCH MLS: 1 INJECTION, POWDER, FOR SOLUTION INTRAMUSCULAR; INTRAVENOUS at 13:00

## 2019-09-03 RX ADMIN — DOCUSATE SODIUM 50 MG AND SENNOSIDES 8.6 MG SCH: 8.6; 5 TABLET, FILM COATED ORAL at 21:37

## 2019-09-03 RX ADMIN — Medication SCH ML: at 09:02

## 2019-09-03 RX ADMIN — DOCUSATE SODIUM 50 MG AND SENNOSIDES 8.6 MG SCH: 8.6; 5 TABLET, FILM COATED ORAL at 09:02

## 2019-09-03 RX ADMIN — ASPIRIN 81 MG CHEWABLE TABLET SCH MG: 81 TABLET CHEWABLE at 08:57

## 2019-09-03 NOTE — PRG
DATE OF SERVICE:  09/03/2019



SUBJECTIVE:  This morning, she is intubated in the vent, sedated.  She failed

extubation, had to be reintubated again.  She is now once again a DNR. 



OBJECTIVE:  VITAL SIGNS:  Blood pressure 132/76, pulse rate is 18, and sats 100%.

I's and O's have been consistently ahead. 

CHEST:  Decreased breath sounds.  Anterior rhonchi. 

CARDIAC:  Normal S1 and S2.  No gallops. 

ABDOMEN:  No masses.



IMPRESSION:  Congestive heart failure, chronic obstructive pulmonary disease,

pneumonia, and leukocytosis. 



PLAN:  Once again, we will discuss with family.  Continue vent support.  Continue

nutrition, PT.  Cardiac care. 



We will follow. 



One-half hour of critical time.







Job ID:  [id number]

## 2019-09-03 NOTE — OP
DATE OF PROCEDURE:  08/30/2019



PROCEDURE PERFORMED:  Fiberoptic intubation.



INDICATIONS FOR PROCEDURE:  Ms. Eid has had a waxing and waning course

throughout the afternoon.  She became less responsive this afternoon.  Her

hemodynamics remained stable.  I met with her son prior to this and he wanted her

intubated again. 



DESCRIPTION OF PROCEDURE:  Disposable fiberoptic scope was brought to the bedside.

A bite block was placed in her mouth.  Throat was sprayed with Cetacaine spray.

Vocal cords were quickly visualized and the endotracheal tube was advanced over the

scope through into the trachea and secured above the main mauricio at 23 cm. 



Bilateral equal breath sounds were obtained. 



Chest radiograph shows proper placement of the endotracheal tube. 



Mostly, there will be some significant improvement over the next week.  She will

likely need a tracheostomy.  The son and the daughter never had this discussion with

her, but I have a feeling this may be the only way that she will survive.  They have

time to get together as a family and think about this.  She will remain mechanically

ventilated again for the next 4 to 5 days probably. 



CRITICAL CARE TIME:  30 minutes.







Job ID:  [id number]

## 2019-09-03 NOTE — PDOC.FM
- Subjective


Subjective: 





NAEO. Patient remains intubated & sedated.





- Objective


MAR Reviewed: Yes


Vital Signs & Weight: 


 Vital Signs (12 hours)











  Temp Pulse Resp BP Pulse Ox


 


 09/03/19 07:00  98.5 F    


 


 09/03/19 06:00    22 H  


 


 09/03/19 04:00  97.8 F   21 H  


 


 09/03/19 03:14   87  26 H   100


 


 09/03/19 02:00    24 H  


 


 09/03/19 00:00    24 H  


 


 09/02/19 23:00  97.7 F    


 


 09/02/19 22:57   76  25 H   99


 


 09/02/19 22:00    22 H  


 


 09/02/19 20:46   80   129/71 


 


 09/02/19 20:00    26 H  








 Weight











Admit Weight                   60.1 kg


 


Weight                         51.4 kg











 Most Recent Monitor Data











Heart Rate from ECG            85


 


NIBP                           132/76


 


NIBP BP-Mean                   94


 


Respiration from ECG           22


 


SpO2                           100














I&O: 


 











 09/02/19 09/03/19 09/04/19





 06:59 06:59 06:59


 


Intake Total 2717 2188.3 


 


Output Total 3177 3025 175


 


Balance -460 -836.7 -175











Result Diagrams: 


 09/03/19 04:45





 09/03/19 05:46





Phys Exam





- Physical Examination


Constitutional: NAD


HEENT: moist MMs


decreased breath sounds throughout with trace crackles in bases


Cardiovascular: RRR, no significant murmur


Gastrointestinal: soft, no distention, positive bowel sounds


Musculoskeletal: no edema


unable to assess 2/2 sedation


Skin: no rash





Dx/Plan


(1) COPD exacerbation


Code(s): J44.1 - CHRONIC OBSTRUCTIVE PULMONARY DISEASE W (ACUTE) EXACERBATION   

Status: Acute   





(2) Acute exacerbation of CHF (congestive heart failure)


Code(s): I50.9 - HEART FAILURE, UNSPECIFIED   Status: Suspected   





(3) Hypothyroidism


Code(s): E03.9 - HYPOTHYROIDISM, UNSPECIFIED   Status: Chronic   





(4) Afib


Code(s): I48.91 - UNSPECIFIED ATRIAL FIBRILLATION   Status: Acute   


Qualifiers: 


   Atrial fibrillation type: paroxysmal   Qualified Code(s): I48.0 - Paroxysmal 

atrial fibrillation   





(5) CAD (coronary artery disease)


Code(s): I25.10 - ATHSCL HEART DISEASE OF NATIVE CORONARY ARTERY W/O ANG PCTRS 

  Status: Chronic   


Qualifiers: 


   Coronary Disease-Associated Artery/Lesion type: native artery   Native vs. 

transplanted heart: native heart   Associated angina: without angina   

Qualified Code(s): I25.10 - Atherosclerotic heart disease of native coronary 

artery without angina pectoris   





(6) COPD (chronic obstructive pulmonary disease)


Status: Chronic   


Qualifiers: 


   COPD type: COPD with acute exacerbation   Qualified Code(s): J44.1 - Chronic 

obstructive pulmonary disease with (acute) exacerbation   





(7) Chronic pain syndrome


Code(s): G89.4 - CHRONIC PAIN SYNDROME   Status: Chronic   





(8) Hypertension


Code(s): I10 - ESSENTIAL (PRIMARY) HYPERTENSION   Status: Chronic   


Qualifiers: 


   Hypertension type: essential hypertension   Qualified Code(s): I10 - 

Essential (primary) hypertension   





(9) Type 2 AMI (acute myocardial infarction)


Code(s): I21.A1 - MYOCARDIAL INFARCTION TYPE 2   Status: Acute   





- Plan


Plan: 





78YOF with a PMH significant for COPD on 2L of O2 at home, mild CAD, HTN, 

paroxysmal A fib, hypothyroidism and chronic pain syndrome who was brought in 

via EMS for acute hypoxic hypercapneic respiratory failure requiring intubation 

in the field PTA.





Acute hypoxic hypercapneic respiratory failure 2/2 a COPD/CHF exacerbation


- Remained stable overnight intubated & sedated. Failed extubation x2 now. Will 

discuss plans for possible terminal extubation with family today as they have 

expressed they do not desire a trach per pulmonology. 


- Will continue IV steroids, ALBA & PRN duonebs & PRN albuterol as well as 

Rocephin per pulm recs. 


- Will continue ALBA lasix as well as QD weights and strict I&Os. 





HTN


- Will wean cardene drip as tolerated & continue to monitor BPs closely. 





ДМИТРИЙ on CKD2


-Renal fxn stable today w/ Cr of 1.00 & eGFR of 54. Will continue to trend and 

consider holding ACEi if if significantly worsens.





A fib with RVR, resolved


- Patient has remained in NSR since 8/28 PM s/p 2 cardioversions but remains on 

amio gtt. Cards on board, appreciate recs.  





Type II MI


- Per cards patient likely suffered an anterior type II MI based on EKG changes 

and uptrending troponins. Will continue ASA & statin therapy per cards recs & 

consider de-escalating Th Lovenox.





Chronic opiate dependence/abuse


-Aware, hx of withdrawal on previous admission. Will restart home meds once off 

sedation but will continue PRN IV morphine for breakthrough agitation while 

intubated & sedated.





Hypothyroidism


- Aware, will continue home meds. 





hyperglycemia


- SSI remains in place should BG levels be >200. Goal to keep BG levels between 

140-180. Hypoglycemia protocol also in place. 





Physical Deconditioning:


- PT and OT on board. 


- CM & PC also on board.  





DVT PPX: Lovenox


GI PPx: famotidine


Abx: Rocephin


Code Status: DNAR





Addendum - Attending





- Attending Attestation


Date/Time: 09/03/19 0988





I personally evaluated the patient and discussed the management with Dr. Perry. 


I agree with the History, Examination, Assessment and Plan documented above 

with any addition or exceptions noted below.





No major changes in patient status overnight. Continues on ventilator as 

difficult to wean and has failed extubation x2. WBC elevated but no fevers or 

current infection source. She continues on IV abx. Awaiting further recs from 

Cardiology and Pulm/CC. Discussion with family again about goals of care, need 

to get palliative care on board if not already. Continues to have mild 

diuresis.

## 2019-09-04 LAB
ANALYZER IN CARDIO: (no result)
ANION GAP SERPL CALC-SCNC: 13 MMOL/L (ref 10–20)
BASE EXCESS STD BLDA CALC-SCNC: 4.9 MEQ/L
BUN SERPL-MCNC: 47 MG/DL (ref 9.8–20.1)
CA-I BLDA-SCNC: 1.2 MMOL/L (ref 1.12–1.3)
CALCIUM SERPL-MCNC: 9.6 MG/DL (ref 7.8–10.44)
CHLORIDE SERPL-SCNC: 98 MMOL/L (ref 98–107)
CO2 SERPL-SCNC: 30 MMOL/L (ref 23–31)
CREAT CL PREDICTED SERPL C-G-VRATE: 36 ML/MIN (ref 70–130)
DIGOXIN SERPL-MCNC: 1.29 NG/ML (ref 0.8–2)
GLUCOSE SERPL-MCNC: 144 MG/DL (ref 83–110)
HCO3 BLDA-SCNC: 27.7 MEQ/L (ref 22–28)
HCT VFR BLDA CALC: 45 % (ref 36–47)
HGB BLD-MCNC: 14.8 G/DL (ref 12–16)
HGB BLDA-MCNC: 15.4 G/DL (ref 12–16)
MCH RBC QN AUTO: 31.4 PG (ref 27–31)
MCV RBC AUTO: 93.6 FL (ref 78–98)
MDIFF COMPLETE?: YES
O2 A-A PPRESDIFF RESPIRATORY: 68.2 MM[HG] (ref 0–20)
PCO2 BLDA: 35.4 MMHG (ref 35–45)
PH BLDA: 7.51 [PH] (ref 7.35–7.45)
PLATELET # BLD AUTO: 419 THOU/UL (ref 130–400)
PO2 BLDA: 65.8 MMHG (ref 70–?)
POTASSIUM BLD-SCNC: 3.9 MMOL/L (ref 3.7–5.3)
POTASSIUM SERPL-SCNC: 4.1 MMOL/L (ref 3.5–5.1)
RBC # BLD AUTO: 4.71 MILL/UL (ref 4.2–5.4)
SODIUM SERPL-SCNC: 137 MMOL/L (ref 136–145)
SPECIMEN DRAWN FROM PATIENT: (no result)
WBC # BLD AUTO: 20.4 THOU/UL (ref 4.8–10.8)

## 2019-09-04 RX ADMIN — DOCUSATE SODIUM 50 MG AND SENNOSIDES 8.6 MG SCH TAB: 8.6; 5 TABLET, FILM COATED ORAL at 09:13

## 2019-09-04 RX ADMIN — Medication SCH: at 21:06

## 2019-09-04 RX ADMIN — AMIODARONE HYDROCHLORIDE SCH MLS: 50 INJECTION, SOLUTION INTRAVENOUS at 04:54

## 2019-09-04 RX ADMIN — ASPIRIN 81 MG CHEWABLE TABLET SCH MG: 81 TABLET CHEWABLE at 09:11

## 2019-09-04 NOTE — PDOC.FM
- Subjective


Subjective: 





NAEO. Patient remains stable on the ventilator on exam this AM. More alert and 

following commands.





- Objective


MAR Reviewed: Yes


Vital Signs & Weight: 


 Vital Signs (12 hours)











  Temp Pulse Resp BP Pulse Ox


 


 09/04/19 06:00    20  


 


 09/04/19 04:00  98.8 F   18  


 


 09/04/19 03:05   87  29 H   98


 


 09/04/19 02:00    24 H  


 


 09/04/19 00:00  99.1 F   21 H  


 


 09/03/19 22:13   84  24 H   97


 


 09/03/19 22:00    24 H  


 


 09/03/19 21:35   88   138/77 


 


 09/03/19 20:00    19  


 


 09/03/19 19:47      99








 Weight











Admit Weight                   60.1 kg


 


Weight                         52.6 kg











 Most Recent Monitor Data











Heart Rate from ECG            86


 


NIBP                           136/77


 


NIBP BP-Mean                   96


 


Respiration from ECG           17


 


SpO2                           97














I&O: 


 











 09/03/19 09/04/19 09/05/19





 06:59 06:59 06:59


 


Intake Total 2188.3 1774 


 


Output Total 3025 3015 


 


Balance -836.7 -1241 











Result Diagrams: 


 09/04/19 04:55





 09/04/19 04:55





Phys Exam





- Physical Examination


Constitutional: NAD


HEENT: moist MMs, sclera anicteric


Neck: supple, full ROM


decreased breath sounds throughout


Cardiovascular: RRR, no significant murmur


Gastrointestinal: soft, non-tender, no distention, positive bowel sounds


Musculoskeletal: no edema


Neurological: non-focal, moves all 4 limbs (but significant weakness throughout)


Skin: no rash


Deviation from normal: bruising to B/L UEs





Dx/Plan


(1) COPD exacerbation


Code(s): J44.1 - CHRONIC OBSTRUCTIVE PULMONARY DISEASE W (ACUTE) EXACERBATION   

Status: Acute   





(2) Acute exacerbation of CHF (congestive heart failure)


Code(s): I50.9 - HEART FAILURE, UNSPECIFIED   Status: Suspected   





(3) Hypothyroidism


Code(s): E03.9 - HYPOTHYROIDISM, UNSPECIFIED   Status: Chronic   





(4) Afib


Code(s): I48.91 - UNSPECIFIED ATRIAL FIBRILLATION   Status: Acute   


Qualifiers: 


   Atrial fibrillation type: paroxysmal   Qualified Code(s): I48.0 - Paroxysmal 

atrial fibrillation   





(5) CAD (coronary artery disease)


Code(s): I25.10 - ATHSCL HEART DISEASE OF NATIVE CORONARY ARTERY W/O ANG PCTRS 

  Status: Chronic   


Qualifiers: 


   Coronary Disease-Associated Artery/Lesion type: native artery   Native vs. 

transplanted heart: native heart   Associated angina: without angina   

Qualified Code(s): I25.10 - Atherosclerotic heart disease of native coronary 

artery without angina pectoris   





(6) COPD (chronic obstructive pulmonary disease)


Status: Chronic   


Qualifiers: 


   COPD type: COPD with acute exacerbation   Qualified Code(s): J44.1 - Chronic 

obstructive pulmonary disease with (acute) exacerbation   





(7) Chronic pain syndrome


Code(s): G89.4 - CHRONIC PAIN SYNDROME   Status: Chronic   





(8) Hypertension


Code(s): I10 - ESSENTIAL (PRIMARY) HYPERTENSION   Status: Chronic   


Qualifiers: 


   Hypertension type: essential hypertension   Qualified Code(s): I10 - 

Essential (primary) hypertension   





(9) Type 2 AMI (acute myocardial infarction)


Code(s): I21.A1 - MYOCARDIAL INFARCTION TYPE 2   Status: Acute   





- Plan


Plan: 





78YOF with a PMH significant for COPD on 2L of O2 at home, mild CAD, HTN, 

paroxysmal A fib, hypothyroidism and chronic pain syndrome who was brought in 

via EMS for acute hypoxic hypercapneic respiratory failure requiring intubation 

in the field PTA.





Acute hypoxic hypercapneic respiratory failure 2/2 a COPD/CHF exacerbation


- Remained stable overnight intubated & sedated. Failed extubation x2 now. 

Plans for compassionate extubation today. Will get hospice on board should 

patient live longer than anticipated following her extubation.


- Will continue IV steroids, ALBA & PRN duonebs & PRN albuterol as well as 

Rocephin per pulm recs. 


- Will continue ALBA lasix as well as QD weights and strict I&Os. 





HTN


- Will continue PO meds as tolerated by patient. 





ДМИТРИЙ on CKD2


-Renal fxn slightly decreased today w/ eGFR of 50. Will continue to trend and 

consider holding ACEi if if significantly worsens.





A fib with RVR, resolved


- Patient has remained in NSR since 8/28 PM s/p 2 cardioversions but remains on 

amio gtt. Cards on board, appreciate recs.  





Type II MI


- Per cards patient likely suffered an anterior type II MI based on EKG changes 

and uptrending troponins. Will continue ASA & statin therapy per cards recs & 

consider de-escalating Th Lovenox.





Chronic opiate dependence/abuse


-Aware, hx of withdrawal on previous admission. Will restart home meds once off 

sedation but will continue PRN IV morphine for breakthrough agitation while 

intubated & sedated.





Hypothyroidism


- Aware, will continue home meds. 





Hyperglycemia


- Will d/c accuchecks & SSI as will continue comfort care measures only more 

than likely once extubated.  





Physical Deconditioning:


- PT and OT on board. 


- CM & PC also on board.  





Dispo: Planning for compassionate extubation at ~10:00 today. Will touch base 

with PC and get hospice on board.


DVT PPX: Lovenox


GI PPx: famotidine


Abx: Rocephin


Code Status: DNAR





Addendum - Attending





- Attending Attestation


Date/Time: 09/04/19 0904





I personally evaluated the patient and discussed the management with Dr. Perry. 


I agree with the History, Examination, Assessment and Plan documented above 

with any addition or exceptions noted below.





Patient overall stable. Still unable to extubate. Family discussing possible 

compassionate and terminal extubation later today with comfort measures only. 

Palliative care on board, will also need hospice involved if that course 

continues.

## 2019-09-04 NOTE — PRG
DATE OF SERVICE:  09/04/2019



SUBJECTIVE:  This morning, she is awake, alert, and responsive.  I spoke to her

daughter at length yesterday, considering extubation today and comfort care.  
She

has had no sedation all night.  Awake, responsive. 



OBJECTIVE:  VITAL SIGNS:  Blood pressure 136/77, pulse 80_, respirations 20.

I's and O's have been negative. 

CHEST:  Decreased breath sounds.  No wheezing.  No crackles. 

CARDIAC:  Normal S1, S2.  No gallops. 

ABDOMEN:  No masses.



LABORATORY DATA:  BNP was 3326. creatinine level is 1.29, BUN is 47.  White 
count

20,000. 



ASSESSMENT/PLAN:  Respiratory failure, cardiomyopathy, severe deconditioning,

advanced age.  Family is to decide today about extubation and comfort care.  X-
ray

looks much improved. 



Still has leukocytosis, though all cultures so far negative. 



Continue nutrition, PT until the family arrives. 



One-half hour of critical time.







Job ID:  [id number]



MTDD

## 2019-09-04 NOTE — RAD
CHEST ONE VIEW:

 

HISTORY:

Respiratory insufficiency.

 

COMPARISON:

09/02/2019

 

FINDINGS:

Stable life support tubes.  Stable bilateral vascular congestion and interstitial and reticulonodular
 opacity changes.  No significant new confluent process.

 

IMPRESSION:

Stable chest.

 

POS: [facility]

## 2019-09-05 NOTE — PDOC.FM
- Subjective


Subjective: 





Patient remained comfortable overnight and was transferred to the floor. 

Appeared comfortable on exam but was minimally responsive only slightly opening 

eyes to her name being called. Did not follow commands or withdraw to pain. 





- Objective


MAR Reviewed: Yes


Vital Signs & Weight: 


 Vital Signs (12 hours)











  Temp Pulse Resp BP Pulse Ox


 


 09/05/19 07:00  98.0 F  68  16  79/48 L  97


 


 09/04/19 23:10  97.2 F L  71  16  62/35 L  95


 


 09/04/19 23:00      95








 Weight











Admit Weight                   60.1 kg


 


Weight                         58.831 kg











 Most Recent Monitor Data











Heart Rate from ECG            71


 


NIBP                           66/41


 


NIBP BP-Mean                   49


 


Respiration from ECG           28


 


SpO2                           100














I&O: 


 











 09/04/19 09/05/19 09/06/19





 06:59 06:59 06:59


 


Intake Total 1774 375 


 


Output Total 3015 800 


 


Balance -1241 -834 











Result Diagrams: 


 09/04/19 04:55





 09/04/19 04:55





Phys Exam





- Physical Examination


Constitutional: NAD


HEENT: moist MMs, sclera anicteric


decreased breath sounds throughout


Cardiovascular: RRR, no significant murmur


Musculoskeletal: no edema


GCS score of 5


Skin: no rash





Dx/Plan


(1) COPD exacerbation


Code(s): J44.1 - CHRONIC OBSTRUCTIVE PULMONARY DISEASE W (ACUTE) EXACERBATION   

Status: Acute   





(2) Acute exacerbation of CHF (congestive heart failure)


Code(s): I50.9 - HEART FAILURE, UNSPECIFIED   Status: Suspected   





(3) Hypothyroidism


Code(s): E03.9 - HYPOTHYROIDISM, UNSPECIFIED   Status: Chronic   





(4) Afib


Code(s): I48.91 - UNSPECIFIED ATRIAL FIBRILLATION   Status: Acute   


Qualifiers: 


   Atrial fibrillation type: paroxysmal   Qualified Code(s): I48.0 - Paroxysmal 

atrial fibrillation   





(5) CAD (coronary artery disease)


Code(s): I25.10 - ATHSCL HEART DISEASE OF NATIVE CORONARY ARTERY W/O ANG PCTRS 

  Status: Chronic   


Qualifiers: 


   Coronary Disease-Associated Artery/Lesion type: native artery   Native vs. 

transplanted heart: native heart   Associated angina: without angina   

Qualified Code(s): I25.10 - Atherosclerotic heart disease of native coronary 

artery without angina pectoris   





(6) COPD (chronic obstructive pulmonary disease)


Status: Chronic   


Qualifiers: 


   COPD type: COPD with acute exacerbation   Qualified Code(s): J44.1 - Chronic 

obstructive pulmonary disease with (acute) exacerbation   





(7) Chronic pain syndrome


Code(s): G89.4 - CHRONIC PAIN SYNDROME   Status: Chronic   





(8) Hypertension


Code(s): I10 - ESSENTIAL (PRIMARY) HYPERTENSION   Status: Chronic   


Qualifiers: 


   Hypertension type: essential hypertension   Qualified Code(s): I10 - 

Essential (primary) hypertension   





(9) Type 2 AMI (acute myocardial infarction)


Code(s): I21.A1 - MYOCARDIAL INFARCTION TYPE 2   Status: Acute   





- Plan


Plan: 





78YOF with a PMH significant for COPD on 2L of O2 at home, mild CAD, HTN, 

paroxysmal A fib, hypothyroidism and chronic pain syndrome who was brought in 

via EMS for acute hypoxic hypercapneic respiratory failure requiring intubation 

in the field PTA.





Acute hypoxic hypercapneic respiratory failure 2/2 a COPD/CHF exacerbation


- Patient extubated and transferred to floor yesterday. Remains stable on NC. 

Will continue comfort care measures only and get hospice on board today.





HTN


ДМИТРИЙ on CKD2


A fib with RVR, resolved


Type II MI


Chronic opiate dependence/abuse


Hypothyroidism


Hyperglycemia


Physical Deconditioning


- Will continue comfort care measures only including IV morphine & ativan for 

agitation and pain control PRN. 





Dispo: Will touch base with PC & CM for likely admission to inpatient hospice.


DVT PPX: none


GI PPx: none


Abx: none


Code Status: DNAR





Addendum - Attending





- Attending Attestation


Date/Time: 09/05/19 9657





I personally evaluated the patient and discussed the management with Dr. Perry. 


I agree with the History, Examination, Assessment and Plan documented above 

with any addition or exceptions noted below.





Patient now on comfort measures only. She is currently comfortable. Symptomatic 

meds as available. Consult Hospice this morning. No aggressive treatment and we 

are allowing her to pass in comfort.

## 2019-09-05 NOTE — PRG
DATE OF SERVICE:  09/05/2019



SUBJECTIVE:  This morning, to my surprise, awake and responsive.  Denies any pain or

difficulty breathing. 



OBJECTIVE:  VITAL SIGNS:  Temperature 98, pulse 68, respirations 16, saturations 98,

and blood pressure 79/48. 

CHEST:  No wheezing or crackles. 

CARDIAC:  Normal S1 and S2.  No gallops. 

ABDOMEN:  No masses.



IMPRESSION AND PLAN:  End-stage cardiomyopathy, respiratory failure, DNR, and

comfort care. 



She has remained stable.  May consider starting some p.o. diet and home medicine if

she can tolerate it. 







Job ID:  [id number]

## 2019-09-06 NOTE — PDOC.FM
- Subjective


Subjective: 





NAEO. Patient remained stable on NC overnight. 





- Objective


MAR Reviewed: Yes


Vital Signs & Weight: 


 Vital Signs (12 hours)











  Temp Pulse Resp BP Pulse Ox


 


 09/05/19 21:20      96


 


 09/05/19 19:18  97.5 F L  70  16  91/57 L  96








 Weight











Admit Weight                   60.1 kg


 


Weight                         58.831 kg











 Most Recent Monitor Data











Heart Rate from ECG            71


 


NIBP                           66/41


 


NIBP BP-Mean                   49


 


Respiration from ECG           28


 


SpO2                           100














I&O: 


 











 09/04/19 09/05/19 09/06/19





 06:59 06:59 06:59


 


Intake Total 1774 375 0


 


Output Total 3015 800 400


 


Balance -1242 -425 -400











Result Diagrams: 


 09/04/19 04:55





 09/04/19 04:55





Phys Exam





- Physical Examination


Constitutional: NAD


HEENT: moist MMs, sclera anicteric


Neck: supple


Respiratory: wheezing present


decreased breath sounds throughout 


Cardiovascular: RRR, no significant murmur


Deviation from normal: GCS score of 6. Not following commands and no response 

to painful stimuli. 


Skin: no rash





Dx/Plan


(1) COPD exacerbation


Code(s): J44.1 - CHRONIC OBSTRUCTIVE PULMONARY DISEASE W (ACUTE) EXACERBATION   

Status: Acute   





(2) Acute exacerbation of CHF (congestive heart failure)


Code(s): I50.9 - HEART FAILURE, UNSPECIFIED   Status: Suspected   





(3) Hypothyroidism


Code(s): E03.9 - HYPOTHYROIDISM, UNSPECIFIED   Status: Chronic   





(4) Afib


Code(s): I48.91 - UNSPECIFIED ATRIAL FIBRILLATION   Status: Acute   


Qualifiers: 


   Atrial fibrillation type: paroxysmal   Qualified Code(s): I48.0 - Paroxysmal 

atrial fibrillation   





(5) CAD (coronary artery disease)


Code(s): I25.10 - ATHSCL HEART DISEASE OF NATIVE CORONARY ARTERY W/O ANG PCTRS 

  Status: Chronic   


Qualifiers: 


   Coronary Disease-Associated Artery/Lesion type: native artery   Native vs. 

transplanted heart: native heart   Associated angina: without angina   

Qualified Code(s): I25.10 - Atherosclerotic heart disease of native coronary 

artery without angina pectoris   





(6) COPD (chronic obstructive pulmonary disease)


Status: Chronic   


Qualifiers: 


   COPD type: COPD with acute exacerbation   Qualified Code(s): J44.1 - Chronic 

obstructive pulmonary disease with (acute) exacerbation   





(7) Chronic pain syndrome


Code(s): G89.4 - CHRONIC PAIN SYNDROME   Status: Chronic   





(8) Hypertension


Code(s): I10 - ESSENTIAL (PRIMARY) HYPERTENSION   Status: Chronic   


Qualifiers: 


   Hypertension type: essential hypertension   Qualified Code(s): I10 - 

Essential (primary) hypertension   





(9) Type 2 AMI (acute myocardial infarction)


Code(s): I21.A1 - MYOCARDIAL INFARCTION TYPE 2   Status: Acute   





- Plan


Plan: 





78YOF with a PMH significant for COPD on 2L of O2 at home, mild CAD, HTN, 

paroxysmal A fib, hypothyroidism and chronic pain syndrome who was brought in 

via EMS for acute hypoxic hypercapneic respiratory failure requiring intubation 

in the field PTA.





Acute hypoxic hypercapneic respiratory failure 2/2 a COPD/CHF exacerbation


- Patient extubated and transferred to floor 2 days ago. Remains stable on NC. 

Will continue comfort care measures only per family and patient's wishes and 

attempt once again to get hospice involved today.





HTN


ДМИТРИЙ on CKD2


A fib with RVR, resolved


Type II MI


Chronic opiate dependence/abuse


Hypothyroidism


Hyperglycemia


Physical Deconditioning


- Will continue comfort care measures only including IV morphine & ativan for 

agitation and pain control PRN. 





Dispo: Will meet with family again today to encourage home w/ home hospice vs. 

admission to inpatient hospice.


DVT PPX: none


GI PPx: none


Abx: none


Code Status: DNAR





Addendum - Attending





- Attending Attestation


Date/Time: 09/06/19 0867





I personally evaluated the patient and discussed the management with Dr. Perry. 


I agree with the History, Examination, Assessment and Plan documented above 

with any addition or exceptions noted below.





Patient here on comfort care after terminal extubation from severe CHF and 

COPD. She is comfortable and will continue to provide comfort meds for her as 

needed. Continue to discuss hospice care with family.

## 2019-09-06 NOTE — PRG
DATE OF SERVICE:  09/06/2019



SUBJECTIVE:  Nivia Eid is a 79-year-old female who was extubated 48 hours 
ago.

To my surprise this morning, she is awake.  She is having some minimal sips of

water. 



OBJECTIVE:  VITAL SIGNS:  Temperature 98.4, pulse 72, respiratory rate 16,

blood pressure 91/57. 

GENERAL:  She is awake, responsive. 

CHEST:  Decreased breath sounds.  No wheezing. 

CARDIAC:  Normal S1, S2.  No gallops. 

ABDOMEN:  No masses.



IMPRESSION:  Congestive cardiomyopathy, respiratory failure, DNR. 



I signed order for inpatient hospice. 



Otherwise, continue supportive care, PT. We will follow.







Job ID:  [id number]



MTDD

## 2019-09-07 NOTE — PDOC.FM
- Subjective


Subjective: 





No significant overnight events. Patient awake and talking this AM. She voiced 

to me that she would like to go home. She states that she lives with her 

daughter. CM has been trying to get in touch with daughter regarding Hospice. 

Patient states that she has low back pain this AM and that she is very thirsty. 

Otherwise, no complaints. 





- Objective


MAR Reviewed: Yes


Vital Signs & Weight: 


 Vital Signs (12 hours)











  Temp Pulse Resp BP Pulse Ox


 


 09/07/19 09:30      95


 


 09/07/19 07:35  97.7 F  71  20  137/70  95








 Weight











Admit Weight                   60.1 kg


 


Weight                         58.831 kg











 Most Recent Monitor Data











Heart Rate from ECG            71


 


NIBP                           66/41


 


NIBP BP-Mean                   49


 


Respiration from ECG           28


 


SpO2                           100














I&O: 


 











 09/06/19 09/07/19 09/08/19





 06:59 06:59 06:59


 


Intake Total 0 90 


 


Output Total 400 550 


 


Balance -400 -460 











Result Diagrams: 


 09/04/19 04:55





 09/04/19 04:55


EKG Reviewed by me: No


Radiology Reviewed by me: Yes





Phys Exam





- Physical Examination


Constitutional: NAD


Awake and alert


dry MMM


Respiratory: clear to auscultation bilateral


Cardiovascular: RRR


2/6 systolic murmur


Gastrointestinal: soft, non-tender, no distention


Musculoskeletal: no edema, pulses present


Neurological: non-focal


contracted muscles


Deviation from normal: answering questions appropriately


Skin: no rash





Dx/Plan


(1) Type 2 AMI (acute myocardial infarction)


Code(s): I21.A1 - MYOCARDIAL INFARCTION TYPE 2   Status: Acute   





(2) Hypothyroidism


Code(s): E03.9 - HYPOTHYROIDISM, UNSPECIFIED   Status: Chronic   





(3) CAD (coronary artery disease)


Code(s): I25.10 - ATHSCL HEART DISEASE OF NATIVE CORONARY ARTERY W/O ANG PCTRS 

  Status: Chronic   


Qualifiers: 


   Coronary Disease-Associated Artery/Lesion type: native artery   Native vs. 

transplanted heart: native heart   Associated angina: without angina   

Qualified Code(s): I25.10 - Atherosclerotic heart disease of native coronary 

artery without angina pectoris   





(4) COPD (chronic obstructive pulmonary disease)


Status: Chronic   





(5) Chronic pain syndrome


Code(s): G89.4 - CHRONIC PAIN SYNDROME   Status: Chronic   





(6) Hypertension


Code(s): I10 - ESSENTIAL (PRIMARY) HYPERTENSION   Status: Chronic   


Qualifiers: 


   Hypertension type: essential hypertension   Qualified Code(s): I10 - 

Essential (primary) hypertension   





- Plan


Plan: 





78YOF with a PMH significant for COPD on 2L of O2 at home, mild CAD, HTN, 

paroxysmal A fib, hypothyroidism and chronic pain syndrome who was brought in 

via EMS for acute hypoxic hypercapneic respiratory failure requiring intubation 

in the field PTA.





Acute hypoxic hypercapneic respiratory failure 2/2 a COPD/CHF exacerbation


- Patient extubated and transferred to floor 3 days ago


- Remains stable on 2.5L NC


- Will continue comfort care measures only per family and patient's wishes and 

attempt once again to get hospice involved


- Per notes, CM/palliative attempted to contact daughter regarding hospice 

yesterday and were unable to reach her


- Patient voices desire to return home today; she is on 2.5L NC and is 

chronically on 2L at home


- All medications have been d/c'd as patient and patient's family voiced desire 

for comfort care only





HTN


- BP meds d/c'd; see above


- BP WNL





ДМИТРИЙ on CKD2


- Patient on palliative/comfort care





A fib with RVR, resolved


- HR 68 without medications


- Not on any meds; desires comfort care





Type II MI


- Treated for 48 hours with therapeutic lovenox





Chronic opiate dependence/abuse


- Morphine PRN for pain and breathing





Hypothyroidism


- Patient desires only palliative care, not wanting any additional meds





Physical Deconditioning


- Will continue comfort care measures only including IV morphine & ativan for 

agitation and pain control PRN. 





Dispo: Will meet with family again today to encourage home w/ home hospice vs. 

admission to inpatient hospice if hospice still desired. 


DVT PPX: none


GI PPx: none


Abx: none


Code Status: DNAR





Dispo: Pending decision for Hospice. 





Addendum - Attending





- Attending Attestation


Date/Time: 09/07/19 3349





I personally evaluated the patient and discussed the management with Dr. Willis.


I agree with the History, Examination, Assessment and Plan documented above 

with any addition or exceptions noted below.


The patient is asking to go home this morning.  Will try to discuss plan of 

care with family when they come to the hospital.

## 2019-09-08 NOTE — EKG
Test Reason : 

Blood Pressure : ***/*** mmHG

Vent. Rate : 095 BPM     Atrial Rate : 095 BPM

   P-R Int : 160 ms          QRS Dur : 092 ms

    QT Int : 376 ms       P-R-T Axes : 067 046 057 degrees

   QTc Int : 472 ms

 

Normal sinus rhythm

Possible Left atrial enlargement

Borderline ECG

 

Confirmed by NAHED LASSITER ([id number]),  SYED WOOD ([id number]) on 9/8/2019 12:24:09 AM

 

Referred By:             Confirmed By:NAHED LASSITER

## 2019-09-08 NOTE — PDOC.FM
- Subjective


Subjective: 





No significant changes overnight. Awaiting daughter's decision regarding 

outpatient Hospice. 





- Objective


MAR Reviewed: Yes


Vital Signs & Weight: 


 Vital Signs (12 hours)











  Temp Pulse Resp BP BP Pulse Ox


 


 09/08/19 08:13     166/88 H  


 


 09/08/19 07:37  97.5 F L  78  20   178/80 H  92 L








 Weight











Admit Weight                   60.1 kg


 


Weight                         58.831 kg











 Most Recent Monitor Data











Heart Rate from ECG            71


 


NIBP                           66/41


 


NIBP BP-Mean                   49


 


Respiration from ECG           28


 


SpO2                           100














I&O: 


 











 09/07/19 09/08/19 09/09/19





 06:59 06:59 06:59


 


Intake Total 90 480 


 


Output Total 550 200 


 


Balance -460 280 











Result Diagrams: 


 09/04/19 04:55





 09/04/19 04:55


EKG Reviewed by me: Yes


Radiology Reviewed by me: Yes





Phys Exam





- Physical Examination


Constitutional: NAD


Cachectic


Dry MM


Rhonchi anteriorly


Cardiovascular: RRR


Gastrointestinal: soft, no distention


Musculoskeletal: no edema


contracted muscles





Dx/Plan


(1) Type 2 AMI (acute myocardial infarction)


Code(s): I21.A1 - MYOCARDIAL INFARCTION TYPE 2   Status: Acute   





(2) Hypothyroidism


Code(s): E03.9 - HYPOTHYROIDISM, UNSPECIFIED   Status: Chronic   





(3) CAD (coronary artery disease)


Code(s): I25.10 - ATHSCL HEART DISEASE OF NATIVE CORONARY ARTERY W/O ANG PCTRS 

  Status: Chronic   


Qualifiers: 


   Coronary Disease-Associated Artery/Lesion type: native artery   Native vs. 

transplanted heart: native heart   Associated angina: without angina   

Qualified Code(s): I25.10 - Atherosclerotic heart disease of native coronary 

artery without angina pectoris   





(4) COPD (chronic obstructive pulmonary disease)


Status: Chronic   





(5) Chronic pain syndrome


Code(s): G89.4 - CHRONIC PAIN SYNDROME   Status: Chronic   





(6) Hypertension


Code(s): I10 - ESSENTIAL (PRIMARY) HYPERTENSION   Status: Chronic   


Qualifiers: 


   Hypertension type: essential hypertension   Qualified Code(s): I10 - 

Essential (primary) hypertension   





- Plan


Plan: 





78YOF with a PMH significant for COPD on 2L of O2 at home, mild CAD, HTN, 

paroxysmal A fib, hypothyroidism and chronic pain syndrome who was brought in 

via EMS for acute hypoxic hypercapneic respiratory failure requiring intubation 

in the field PTA.





Acute hypoxic hypercapneic respiratory failure 2/2 a COPD/CHF exacerbation


- Remains stable on 2.5L NC


- Continue comfort care; pending outpatient hospice services, will try to touch 

base with daughter again today


- Patient voices desire to return home today; she is on 2.5L NC and is 

chronically on 2L at home


- All medications have been d/c'd as patient and patient's family voiced desire 

for comfort care only





HTN


- BP meds d/c'd; see above


- BP WNL





ДМИТРИЙ on CKD2


- Patient on palliative/comfort care





A fib with RVR, resolved


- HR 68 without medications


- Not on any meds; desires comfort care





Type II MI


- Treated for 48 hours with therapeutic lovenox





Chronic opiate dependence/abuse


- Morphine PRN for pain and breathing





Hypothyroidism


- Patient desires only palliative care, not wanting any additional meds





Physical Deconditioning


- Will continue comfort care measures only including IV morphine & ativan for 

agitation and pain control PRN. 





Dispo: Pending outpatient hospice services


DVT PPX: none


GI PPx: none


Abx: none


Code Status: DNAR








Addendum - Attending





- Attending Attestation


Date/Time: 09/08/19 6915





I personally evaluated the patient and discussed the management with Dr. Willis.


I agree with the History, Examination, Assessment and Plan documented above 

with any addition or exceptions noted below.


The patient was awake and asking for water and pt care tech provided this.  It 

is my understanding that the patient's daughter is picking a hospice company to 

take her mom home with hospice.  Will await her decisions.

## 2019-09-09 NOTE — PDOC.FM
- Subjective


Subjective: 


The patient appeared to be resting comfortable in bed at the time of evaluation 

and was not in acute distress. Nursing staff reported minimal pain symptoms, 

with subsequent resolution following morphine administration. No overnight 

events reported.





- Objective


Vital Signs & Weight: 


 Vital Signs (12 hours)











  Temp Pulse Resp BP Pulse Ox


 


 09/09/19 08:00      91 L


 


 09/09/19 07:30  98.7 F  104 H  24 H  144/75 H  91 L


 


 09/09/19 07:27  98.0 F  67  18  125/63  99








 Weight











Admit Weight                   60.1 kg


 


Weight                         58.831 kg











 Most Recent Monitor Data











Heart Rate from ECG            71


 


NIBP                           66/41


 


NIBP BP-Mean                   49


 


Respiration from ECG           28


 


SpO2                           100














I&O: 


 











 09/08/19 09/09/19 09/10/19





 06:59 06:59 06:59


 


Intake Total 480 520 


 


Output Total 200 600 


 


Balance 280 -80 











Result Diagrams: 


 09/04/19 04:55





 09/04/19 04:55





Phys Exam





- Physical Examination


Constitutional: NAD


HEENT: PERRLA, moist MMs, sclera anicteric, oral pharynx no lesions


Neck: supple, full ROM


Respiratory: no wheezing, no rales, no rhonchi, clear to auscultation bilateral


Cardiovascular: RRR, no significant murmur, no rub


Gastrointestinal: soft, non-tender, no distention


Musculoskeletal: no edema


Neurological: non-focal


Psychiatric: normal affect





Dx/Plan


(1) COPD exacerbation


Code(s): J44.1 - CHRONIC OBSTRUCTIVE PULMONARY DISEASE W (ACUTE) EXACERBATION   

Status: Acute   





(2) Acute exacerbation of CHF (congestive heart failure)


Code(s): I50.9 - HEART FAILURE, UNSPECIFIED   Status: Suspected   





(3) Sepsis


Code(s): A41.9 - SEPSIS, UNSPECIFIED ORGANISM   Status: Suspected   


Qualifiers: 


   Sepsis type: sepsis due to unspecified organism   Qualified Code(s): A41.9 - 

Sepsis, unspecified organism   





(4) Acute respiratory failure with hypoxia


Code(s): J96.01 - ACUTE RESPIRATORY FAILURE WITH HYPOXIA   Status: Resolved   





- Plan


Plan: 


1. Acute Hypoxic Hypercapneic Respiratory Failure 2/2 to COPD/CHF exacerbation


-Remains stable on 2.5L NC


-All medications have been d/c'd as patient and patient's family voiced desire 

for comfort care only


-Continue comfort care for pain and agitation pending outpatient hospice 

services, will try to touch base with daughter again today





2. HTN


-BP: 144/75


-BP medication regimen has been DC'd; see #1





3. ДМИТРИЙ on CKD2


-See #1





4. A-Fib with RVR, resolved


-HR: 104 without medications


-Tachycardia acceptable based on desire for comfort care, see #1





5. Type II MI


-Treated for 48 hours with therapeutic Lovenox





6. Chronic Opioid Dependence


-Morphine PRN for pain and breathing





7. Hypothyroidism


-See #1 





8. Physical Deconditioning


-Will continue comfort care measures only


-IV Morphine & Ativan for pain and agitation 





Code Status: DNAR


DVT PPX: None


GI PPx: None


Abx: None





Dispo: Follow-up with MPOA (Daughter) to discuss outpatient hospice services 

desired, plan for DC.





Addendum - Attending





- Attending Attestation


Date/Time: 09/09/19 8033





I personally evaluated the patient and discussed the management with Dr. Calzada. 


I agree with the History, Examination, Assessment and Plan documented above 

with any addition or exceptions noted below.





Patient here for comfort measures. She is overall comfortable. Working with 

family to get Hospice on board.

## 2019-09-09 NOTE — PRG
DATE OF SERVICE:  09/09/2019



SUBJECTIVE:  Surprisingly, she is still awake, responsive, and smiling.



OBJECTIVE:  VITAL SIGNS:  Saturations are 91% on 2 L, respiratory rate is 24, pulse

104, temperature 98, and blood pressure 140/75. 

CHEST:  Bilateral rhonchi and crackles. 

CARDIAC:  Normal S1 and S2.  No gallops. 

ABDOMEN:  No masses.



ASSESSMENT AND PLAN:  End-stage cardiomyopathy, chronic obstructive pulmonary

disease, respiratory failure, and DNR. 



Awaiting input from family.  Hospice placement.







Job ID:  [id number]

## 2019-09-10 VITALS — SYSTOLIC BLOOD PRESSURE: 88 MMHG | TEMPERATURE: 99 F | DIASTOLIC BLOOD PRESSURE: 61 MMHG

## 2019-09-10 NOTE — PRG
DATE OF SERVICE:  09/10/2019



SUBJECTIVE:  A 79-year-old female.  She remains in the ICU, DNR.  Shallow

respirations. 



OBJECTIVE:  VITAL SIGNS:  Temperature is 99, pulse 80, respirations 22, saturations

are 90% on 3 L, blood pressure 80/61. 

CHEST:  No wheezing.  Minimal crackles. 

CARDIAC:  Normal S1, S2.  No gallops. 

ABDOMEN:  No mass.



IMPRESSION:  Congestive heart failure, chronic obstructive pulmonary disease, DNR.



PLAN:  Comfort care.  Hopefully, Hospice has arranged for her to be transferred home

today. 







Job ID:  [id number]

## 2019-09-10 NOTE — PDOC.FM
- Subjective


Subjective: 


Patient was resting comfortably in her hospital bed, but was non-responsive 

during the evaluation. Per chart review and the nursing staff, there were no 

overnight events and the patient never appeared to be in pain or acute 

respiratory distress. Urine output was <400 mL for the duration of the shift.





- Objective


MAR Reviewed: Yes


Vital Signs & Weight: 


 Vital Signs (12 hours)











  Temp Pulse Resp BP BP Pulse Ox


 


 09/10/19 07:20  99.0 F  88  22 H  88/61 L   90 L


 


 09/09/19 20:05       99


 


 09/09/19 20:03  98.7 F  109 H  18   107/67  100








 Weight











Admit Weight                   60.1 kg


 


Weight                         58.831 kg











 Most Recent Monitor Data











Heart Rate from ECG            71


 


NIBP                           66/41


 


NIBP BP-Mean                   49


 


Respiration from ECG           28


 


SpO2                           100














I&O: 


 











 09/09/19 09/10/19 09/11/19





 06:59 06:59 06:59


 


Intake Total 520 50 


 


Output Total 600 450 


 


Balance -80 -400 











Result Diagrams: 


 09/04/19 04:55





 09/04/19 04:55





Phys Exam





- Physical Examination


Constitutional: NAD


HEENT: moist MMs


Neck: supple, full ROM


Respiratory: no wheezing, no rales


Mild tachypnea with diffuse rhonchi


Cardiovascular: RRR, no rub (3/6 systolic murmur heard throughout the precordium

)


Gastrointestinal: soft, non-tender, no distention


Musculoskeletal: no edema, pulses present


Right foot was mildly cool


Neurological: non-focal


Lymphatic: no nodes





Dx/Plan


(1) COPD exacerbation


Code(s): J44.1 - CHRONIC OBSTRUCTIVE PULMONARY DISEASE W (ACUTE) EXACERBATION   

Status: Acute   





(2) Acute exacerbation of CHF (congestive heart failure)


Code(s): I50.9 - HEART FAILURE, UNSPECIFIED   Status: Suspected   





(3) Sepsis


Code(s): A41.9 - SEPSIS, UNSPECIFIED ORGANISM   Status: Suspected   


Qualifiers: 


   Sepsis type: sepsis due to unspecified organism   Qualified Code(s): A41.9 - 

Sepsis, unspecified organism   





(4) Acute respiratory failure with hypoxia


Code(s): J96.01 - ACUTE RESPIRATORY FAILURE WITH HYPOXIA   Status: Resolved   





- Plan


Plan: 


1. Acute Hypoxic Hypercapneic Respiratory Failure 2/2 to COPD/CHF exacerbation


-Remains stable on 2.5L NC


-All medications have been d/c'd as patient and patient's family voiced desire 

for comfort care only


-Continue comfort care for pain and agitation pending outpatient hospice 

services





2. HTN


-BP: 107/67


-BP medication regimen has been DC'd; see #1





3. ДМИТРИЙ on CKD2


-See #1





4. A-Fib with RVR, resolved


-HR: 108, still without medications


-Tachycardia acceptable based on desire for comfort care, see #1





5. Type II MI


-See #1





6. Chronic Opioid Dependence


-Morphine PRN for pain and breathing





7. Hypothyroidism


-See #1 





8. Physical Deconditioning


-Will continue comfort care measures only


-IV Morphine & Ativan for pain and agitation 





Code Status: DNAR


DVT PPX: None


GI PPx: None


Abx: None





Dispo: Daughter was contacted at 0730 to confirm delivery of home hospice 

equipment. DC orders submitted - anticipate transport to home in [place] 

later this morning.





Addendum - Attending





- Attending Attestation


Date/Time: 09/10/19 0815





I personally evaluated the patient and discussed the management with Dr. Calzada. 


I agree with the History, Examination, Assessment and Plan documented above 

with any addition or exceptions noted below.





Patient here currently with comfort measures. Hospice supplies have been 

delivered to her home and she will be discharged there today.

## 2019-09-11 NOTE — DIS
DATE OF ADMISSION:  08/23/2019



DATE OF DISCHARGE:  09/10/2019



ADMITTING ATTENDING:  Huseyin Winkler MD



DISCHARGE ATTENDING:  Mio Darden MD



CONSULTS:  

1. Wm Lazcano MD.

2. Amadou Romano MD.

3. Aletha Reilly.



PROCEDURES:  Abdominal pelvis CT scan, brain CT, multiple chest x-rays,

echocardiogram, unsuccessful electrocardioversion. 



PRIMARY DIAGNOSIS:  Acute hypoxic respiratory failure.



SECONDARY DIAGNOSES:  

1. Chronic obstructive pulmonary disease.

2. Congestive heart failure.

3. Previous myocardial infarction.

4. Hypertension.

5. Hyperlipidemia.

6. Atrial fibrillation.

7. Hypothyroidism.

8. Coronary artery disease.

9. Chronic opioid use.

10. Chronic tobacco abuse.



DISCHARGE MEDICATIONS:  None.



DISCONTINUED MEDICATIONS:  

1. Morphine.

2. Lorazepam.

3. Cefepime.

4. Vancomycin.

5. Fluticasone/salmeterol.

6. Alendronate.

7. Lactated Ringer's.

8. Senokot.

9. Enoxaparin.

10. Ceftriaxone.

11. Levothyroxine.

12. __________.

13. Aspirin.

14. __________.

15. Gabapentin.

16. Potassium chloride.

17. Sertraline.

18. Ipratropium-albuterol.

19. Pravastatin.

20. Prednisone.

21. Famotidine.

22. Methylprednisolone.

23. Apixaban.

24. Hydrocodone 5/325.

25. Diltiazem.

26. Labetalol.

27. Humalog.

28. Digoxin.

29. Vitamin D3.

30. Potassium chloride.

31. Magnesium oxide.

32. Lisinopril.

33. Spironolactone.

34. Carvedilol.

35. Nicardipine.

36. Budesonide.

37. Metolazone.



HISTORY OF PRESENT ILLNESS/HOSPITAL COURSE:  To be completed at a later date.



DISPOSITION:  Guarded.



DISCHARGE INSTRUCTIONS:  



LOCATION:  [facility].



DIET:  As tolerated.  No restrictions.



ACTIVITY:  As tolerated.



FOLLOWUP:  No followup expected.  The patient is on hospice and will most likely

begin actively dying in the near term future.  Do not expect any followup with

external physicians. 







Job ID:  [id number]

## 2020-11-23 NOTE — PDOC.FPRHP
- History of Present Illness


Chief Complaint: Chest Pain


History of Present Illness: 


Mrs. Eid is a pleasant 77yo CF with h/o Afib on Eliquis, HTN, chronic pain

, COPD on 2L O2, and hypothyroidism who presents for aytpical chest pain. 

Patient states she was sitting in her recliner this morning at about 0600 when 

she began to experience a squeezing, left-sided chest pain rated 7/10 and 

lasting approximately 1 minute. The pain then resolved on its own, but she then 

experienced 2 more similar episodes over the next few minutes. EMS was called 

and she was transported to the [place] ED. Once there, she was given nitro 

and ASA with initial trops negative and transferred to [facility] ED for 

further eval and management. She has not experienced any more chest pain since 

prior to presentation to [place] ED.





She states that during the episode she did not experience any n/v, diaphoresis, 

radiation of the pain, SOB, LOC, or associated pain. She denies any recent 

illness, change in medications, or previous episodes similar in the past. She 

has had an extensive cardiac workup in the past. Echo in Oct 2018 showed EF of 

60-65% and moderate to severe tricuspid regurg, Heart cath in Nove 2018 

demonstrated 20% stenosis in the RCA, and Stress test in Jan 2019 was without 

any demand-perfusion deficits. Her cardiologist is Dr. Romano.





ED Course: 


In the ED she was given ASA and nitro. Initial trops x2 negative. Admitted for 

cardiac r/o.








- Allergies/Adverse Reactions


 Allergies











Allergy/AdvReac Type Severity Reaction Status Date / Time


 


hydralazine Allergy   Verified 04/19/19 13:50


 


Latex, Natural Rubber Allergy   Verified 07/14/19 13:43














- Home Medications


 











 Medication  Instructions  Recorded  Confirmed  Type


 


Sennosides/Docusate Sodium 2 tab PO BID PRN  tab 11/06/18 11/22/18 Rx





[Senokot S]    


 


Acetaminophen [Tylenol Regular 650 mg PO Q4H PRN  tab 12/12/18  Rx





Strength]    


 


Apixaban [Eliquis] 5 mg PO BID  tab 12/12/18  Rx


 


Aspirin [Ecotrin Low Strength] 81 mg PO DAILY  tab 12/12/18  Rx


 


Atorvastatin Calcium 20 mg PO HS #30 tablet 12/12/18  Rx


 


Bisacodyl [Dulcolax] 10 mg PO DAILYPRN PRN  tab 12/12/18  Rx


 


Clotrimazole [Lotrimin 1% Cream] 0 gm TOP BID  tube 12/12/18  Rx


 


Diltiazem CD [Cardizem CD] 120 mg PO DAILY  cap 12/12/18 04/23/19 Rx


 


Dronedarone HCl [Multaq] 400 mg PO BID-WM  tab 12/12/18  Rx


 


Famotidine [Pepcid] 20 mg PO BID #60 tab 12/12/18  Rx


 


Furosemide [Lasix] 20 mg PO 0900,1400  tab 12/12/18  Rx


 


Ipratropium/Albuterol Sulfate 3 ml NEB QID PRN  neb 12/12/18  Rx





[DuoNeb]    


 


Lantiseptic 0 gm TOP PRN PRN  jar 12/12/18  Rx


 


Levothyroxine Sodium [Synthroid] 50 mcg PO 0600  tab 12/12/18 04/23/19 Rx


 


Mag Hydrox/Al Hydrox/Simeth 30 ml PO Q6H PRN #120 udcup 12/12/18  Rx





[Maalox Plus]    


 


Morphine ER [MS Contin] 60 mg PO 0600,1500,2100  tab 12/12/18 04/23/19 Rx


 


Nicotine [Nicoderm CQ] 7 mg TD DAILY #30 patch 12/12/18  Rx


 


Polyethylene Glycol 3350 [Miralax] 17 gm PER TUBE DAILY #1 bot 12/12/18  Rx


 


Sertraline HCl [Zoloft] 50 mg PO DAILY  tab 12/12/18  Rx


 


traZODone HCl [Desyrel] 50 mg PO HS PRN #15 tab 12/12/18  Rx


 


Acetaminophen/Diphenhydramine 1 tablet PO TID 01/30/19 01/30/19 History





[Percogesic 325-12.5 mg Tablet]    


 


Albuterol Sulfate [Albuterol 2.5 mg NEB Q4H PRN 01/30/19 04/23/19 History





Sulfate Neb]    


 


Albuterol Sulfate [Proair HFA] 2 puff INH Q6HR PRN 01/30/19 01/30/19 History


 


Apixaban [Eliquis] 5 mg PO BID 01/30/19 04/23/19 History


 


Budesonide-Formoterol [Symbicort 2 puff INH BID 01/30/19 04/23/19 History





160-4.5]    


 


Diltiazem HCl [Cardizem] 1 tab PO DAILY 01/30/19 01/30/19 History


 


Dronedarone HCl [Multaq] 400 mg PO BID-WM 01/30/19 04/23/19 History


 


Fluticasone Propionate [Flonase 1 spray EA NARE DAILY 01/30/19 04/23/19 History





Allergy Relief]    


 


Gabapentin 600 mg PO QID 01/30/19 04/23/19 History


 


HYDROcodone/Acetaminophen [Norco 1 tab PO QID PRN 01/30/19 04/23/19 History





 Tablet]    


 


Hydrochlorothiazide 25 mg PO DAILY 01/30/19 04/23/19 History


 


Morphine Sulfate [Morphabond ER] 60 mg PO Q8H 01/30/19 01/30/19 History


 


Nitroglycerin [Nitrostat] 0.4 mg SL Q5MIN #1 bot 01/30/19  Rx


 


Pravastatin Sodium [Pravachol] 20 mg PO HS #30 tab 01/30/19  Rx


 


Acetaminophen [Tylenol Regular 650 mg PO Q4H PRN  tab 04/24/19  Rx





Strength]    


 


Calcium Carbonate [Tums] 1,000 mg PO Q4H PRN  tab 04/24/19  Rx


 


Ipratropium/Albuterol Sulfate 3 ml NEB P2SY-IV PRN  neb 04/24/19  Rx





[DuoNeb]    


 


Ipratropium/Albuterol Sulfate 3 ml NEB [id number]  neb 04/24/19  Rx





[DuoNeb]    


 


Nicotine [Nicoderm CQ] 21 mg TD Q24HR #30 patch 04/24/19  Rx


 


Tiotropium [Spiriva Handihaler] 18 mcg INH DAILY #1 inh 04/24/19  Rx


 


predniSONE 40 mg PO QAM-WM #3 tab 04/24/19  Rx














- History


PMHx:Afib on Eliquis, Hypothyroidism, HTN, COPD on 2L, HLD, chronic pain on 

Percocet and morphine


 


PSHx: Hysterectomy, appy, patrica, hernia repair, R hip replacement, spinal 

surgery with hardware placement, tonsilectomy





FHx: No family history of cardiac conditions.


 


Social: 1ppd x60+ years, no alcohol or tobacco use. Lives at home with Daughter.


 








- Review of Systems


General: denies: fever/chills, weight/appetite/sleep changes, night sweats, 

fatigue


Eyes: denies: vision changes


ENT: denies: nasal congestion, rhinorrhea


Respiratory: denies: cough, congestion, shortness of breath, exercise 

intolerance


Cardiovascular: reports: chest pain, edema.  denies: palpitation, paroxysmal 

nocturnal dyspnea, orthopnea


Gastrointestinal: denies: nausea, vomiting, diarrhea, constipation, abdominal 

pain, GI bleeding


Genitourinary: denies: incontinence, dysuria, polyuria


Skin: denies: rashes


Musculoskeletal: reports: pain (chronic back pain), stiffness (chronic in back)


Neurological: denies: numbness, syncope, seizure





- Vital signs


BP: 141/70  HR: 63 RR: 18 Tmax: 87.5 Pox: 97% on RA  Wt: 58kg   








- Physical Exam


Constitutional: NAD, awake, alert and oriented, well developed


HEENT: normocephalic and atraumatic, PERRLA, EOMI, grossly normal vision, 

grossly normal hearing, MMM


Neck: supple, trachea midline, no JVD


Chest: no-tender to palpation


Heart: RRR, normal S1/S2, no murmurs/rubs/gallops, pulses present, other (1+ 

pitting edema to ankles bilaterally.)


Lungs: no respiratory distress, good air movement, no wheezing, other (Good 

aeration throughout. Slight rales BL bases.)


Abdomen: soft, non-tender, bowel sounds present, no masses/distention


Musculoskeletal: normal structure, normal tone


Neurological: no focal deficit, normal sensation


Skin: other (3cm healing contusion on right lower left from fall out of chair a 

few days ago. Surround edema, but no erythema or exudates noted.)


Heme/Lymphatic: no unusual bruising or bleeding


Psychiatric: normal mood and affect, good judgment and insight





FMR H&P: Results





- Labs


Lab results: 


 Laboratory Tests











  07/14/19 07/14/19 07/14/19





  06:35 06:35 06:35


 


WBC   11.6 H 


 


Hgb   12.3 


 


Plt Count   231 


 


Sodium  141  


 


Potassium  3.6  


 


Chloride  98  


 


Carbon Dioxide  33 H  


 


BUN  18  


 


Creatinine  1.09  


 


Glucose  96  


 


Calcium  9.2  


 


Total Bilirubin  Less than  0.2 L  


 


AST  19  


 


ALT  16  


 


Alkaline Phosphatase  99  


 


Troponin I    Less than  0.010


 


B-Natriuretic Peptide   














  07/14/19 07/14/19





  06:35 10:31


 


WBC  


 


Hgb  


 


Plt Count  


 


Sodium  


 


Potassium  


 


Chloride  


 


Carbon Dioxide  


 


BUN  


 


Creatinine  


 


Glucose  


 


Calcium  


 


Total Bilirubin  


 


AST  


 


ALT  


 


Alkaline Phosphatase  


 


Troponin I   Less than  0.010


 


B-Natriuretic Peptide  186.4 H 

















- EKG Interpretation


EKG: 


NSR. Normal intervals. Inverted T wave in V1. No acute ST changes.








FMR H&P: A/P





- Problem List


(1) Atypical chest pain


Current Visit: Yes   Status: Acute   Code(s): R07.89 - OTHER CHEST PAIN   





(2) Afib


Current Visit: No   Status: Chronic   Code(s): I48.91 - UNSPECIFIED ATRIAL 

FIBRILLATION   


Qualifiers: 


   Atrial fibrillation type: paroxysmal   Qualified Code(s): I48.0 - Paroxysmal 

atrial fibrillation   





(3) COPD (chronic obstructive pulmonary disease)


Current Visit: No   Status: Chronic   





(4) Chronic pain


Current Visit: No   Status: Chronic   Code(s): G89.29 - OTHER CHRONIC PAIN   


Qualifiers: 


   Chronic pain type: chronic pain syndrome   Qualified Code(s): G89.4 - 

Chronic pain syndrome   





(5) Hypertension


Current Visit: No   Status: Chronic   Code(s): I10 - ESSENTIAL (PRIMARY) 

HYPERTENSION   


Qualifiers: 


   Hypertension type: essential hypertension   Qualified Code(s): I10 - 

Essential (primary) hypertension   


Comment: controlled   





- Plan


Mrs. Eid is a 77yo CF with h/o Afib on Eliquis, COPD on 2L, chronic pain 

on Morphine and Percocet, and HTN who presents with atypical chest pain.





1. Atypical chest pain


- cardiac vs GI vs MSK


- EKG and trops x2 negative. No return of sxs.


- Extensive cardiac workup in past. Echo (10/2018) 60-65%, Cath (11/2018) 20% 

stenosis in RCA, and Stress (1/2019) WNL. 2/2 entensive workup in past year, 

will not undergo stress today and will pursue enzyme cardiac r/o


- Will continue to trend trops x4 total


- CBC and CMP WNL, will check mg and phos





2. Afib


- On Eliquis at home, will continue. Admitted to Tele Obs for monitoring. 

Continue home medications for rate control.





3. Chronic pan


- continue home morphine and percocet


- continue home medications





4. COPD on 2L


- No acute exacerbation, will provide 2L O2 and monitor.





5. HTN


- continue home medications





6. HLD


- continue home medications





7. Hypothryoidism


- Continue home levo. Will check TSH.





Diet: Heart Heathy


VTE: Eliquis


Code: Full





Disposition/LOS: 


Admit to Tele for observation. Trending troponins. Anticipate d/c within 

48hours pending clinical course.








FMR H&P: Upper Level





- Pertinent history





79 yo F w/hx of CAD, afib, HLD, HTN, opiate dependance, COPD, MI x2, 

hypothyroidism here with complaint of squeezing chest pain that lasted less 

than 1 hour at approx 0600 on 7/14 while watching TV. Denies exacerbating or 

remitting factors and notes that the pain stopped on its own. She has had 2 MIs 

in the past, but has never had similar pain. She was initially seen in the 

[place] ED where initial trop was negative and EKG was reassuring. She was 

given ASA and nitro and transferred to this facility for further monitoring. 

Here, repeat trop was also negative and EKG remained largely normal with the 

exception of inverted T wave in V1 and 1st degree AV block with sinus 

bradycardia. CP has not returned since initial episode 





See intern note for full ROS, PE, vitals, and lab results. 





ROS 


General denies fever, chills


HEENT denies changes in vision or headache


CV complains of CP. Denies radiation of pain or diaphoresis 


Resp denies SOB, cough


GI denies n/v


Extremities denies edema 





- Pertinent findings





PE


General A&O x4, no acute distress 


HEENT NCAT


CV RRR, no murmur, no pedal edema, no chest wall TTP


Resp CTA b/l


GI non distended, no tenderness


Extremities no edema, normal pulses


Neuro no focal deficits 





- Plan


Date/Time: 07/14/19 1150








I, El Stewart DO, have evaluated this patient and agree with findings/plan 

as outlined by intern resident. Pertinent changes/additions are listed here.





1. Atypical chest pain 


- given normal stress in January of this year and only 20% RCA occlusion on 

cath in Nov of 18, this is unlikely to be ACS


- trend trops x3 and monitor on tele, if CP returns will get stress in am 


- elevated BNP is at baseline 





2. COPD


- at baseline 2L NC





3. HTN 


- home meds





4. CAD


- continue statin/asa





5. A fib


- currently in NSR, continue DOAC





6. Hypothyroid


- home synthroid 





Addendum - Attending





- Attending Attestation


Date/Time: 07/14/19 9109





I personally evaluated the patient and discussed the management with Dr. Smith/

Pat.


I agree with the History, Examination, Assessment and Plan documented above 

with any addition or exceptions noted below.





Patient with multiple PMHx here with acute onset of chest pain. She had normal 

EKG and vitals signs and has not had return of the chest pain. She had stress 

testing about 6 months ago, as well as heart cath about 8 months ago that 

showed no CAD, only 20% stenosis in some distal coronary branches. She is not 

indicated for repeat imaging at this time. Will r/o ACS with enzymes. Suspect 

this may be more related to pulmonary disease due to history of COPD and echo 

review that shows some RV enlargement. Monitor through the day and likely 

discharge tomorrow. Detail Level: Detailed

## 2023-01-04 NOTE — DIS
DATE OF ADMISSION:  11/16/2018

 

DATE OF DISCHARGE:  11/22/2018

 

DISCHARGE DISPOSITION:  [facility].

 

PRIMARY CARE PHYSICIAN:  Dorcas Howard M.D.

 

DISCHARGE DIAGNOSES:

1.  Paroxysmal atrial fibrillation and flutter, on Multaq.

2.  Pneumonia.

3.  Chronic deconditioning.

4.  Acute on chronic congestive diastolic heart failure.

5.  Suspected sepsis due to pneumonia.

6.  Acute metabolic encephalopathy, resolved.

7.  Acute hypoxic respiratory failure, resolved.

8.  Coronary artery disease.

9.  Chronic pain syndrome.

10.  Hypertension.

 

DISCHARGE MEDICATIONS:

1.  Nicotine patch 14 mg q.24 hours.

2.  Tessalon Perles p.r.n.

3.  Lipitor.

4.  Aspirin 81 mg daily.

5.  Eliquis 5 mg p.o. b.i.d.

6.  MS Contin 60 mg p.o. t.i.d.  Please note that this is a home medication.  No prescription was pro
vided.

7.  Dulera 1 puff b.i.d.

8.  Levothyroxine 50 mcg daily.

9.  DuoNebs q.i.d. p.r.n.

10.  Zoloft 50 mg daily.

11.  Clonidine 0.1 mg p.o. b.i.d. p.r.n.

12.  Levofloxacin 500 mg p.o. daily for 5 more days.

13.  Lasix 20 mg p.o. b.i.d.

14.  Multaq 400 mg p.o. b.i.d.

15.  Diltiazem  mg p.o. daily.  This has been changed from 250 mg daily that the patient was pr
eviously taking.

16.  Bisacodyl and acetaminophen p.r.n.

 

INHOUSE CONSULTATIONS:

1.  Cardiology, Gonzalo Linn M.D. and Titus Whalen MD

2.  Electrophysiology, Arya Lazo M.D.

3.  General Surgery, Vasile Felton DO

 

PROCEDURES DONE IN THE HOSPITAL:

1.  CT scan of the brain on 11/16/2018, which shows no acute intracranial abnormalities.

2.  CT angio of the thorax on 11/16/2018, which shows pneumonia in both lower lobes, nonspecific medi
astinal and hilar lymphadenopathy and possibly congestive heart failure.

3.  Severe osteoporosis was also seen.  There was no pulmonary embolism.

4.  Placement of a triple lumen left subclavian central venous line by Dr. Felton on 11/16/2018, for I
V access.

5.  Multiple chest x-rays.

 

HOSPITAL COURSE:  Ms. Eid is a 78-year-old pleasant female [facility] prior to presentation:  For Shortness of breath and
 fever.  According to the admission H and P, she was found to be hypoxic with sats dropping to 80% an
d blood pressure dropping down to 70 systolic, requiring IV least fluid resuscitation.  She was also 
found to be febrile.  She was admitted with a presumptive diagnosis of sepsis with septic shock on pr
esentation.  Chest x-ray showed bilateral pneumonia.  She had a leukocytosis with left shift and band
emia.  UA showed leukocyte esterase and multiple wbc's.  EKG showed atrial fibrillation.  Please see 
admission history and physical for further detail.

 

HOSPITAL COURSE:  CT angio was done and it was negative for pulmonary embolism.  The patient was alre
raul on aspirin, Lipitor, digoxin, and Eliquis.  Pulmonary Critical Care Medicine was also consulted.

 

The patient was started and continued on broad spectrum empiric IV antibiotics and cultures were orde
red.  Her blood culture and urine culture remained negative until date.

 

She went into atrial fibrillation with RVR during hospitalization for which Cardiology was consulted 
and she was initially treated with IV Cardizem, which was later transitioned to oral and Multaq orall
y.  Dr. Lazo also saw the patient and recommended continuation of arrhythmic for now.  As of this [facility]
javier, she has been cleared for discharge by Cardiology and Electrophysiology.  She will continue the 
oral anticoagulation and antiarrhythmics along with calcium channel blocker.  [facility]

 

She was seen and examined prior to discharge.  She has been accepted at [facility] for re
habilitation and is hemodynamically stable and will be discharged.

 

PHYSICAL EXAMINATION:  Per my review this morning include,

VITAL SIGNS:  Temperature 98.5, heart rate 68, respirations 16, saturating 95% on 2 liters nasal lakisha
veto.

GENERAL:  No acute distress, awake, alert, oriented x3.

CHEST:  Clear to auscultation bilaterally.  Rate and rhythm is regular.

 

Discharge plan was discussed with the patient and she verbalized understanding.

 

DISCHARGE LAB WORK:  CBC shows WBCs of 13.8, which was 17.3 upon presentation.  Serum chemistries unr
emarkable.

 

Total time spent 38 minutes. Pt is calling back to speak to nurse, said he gave her the wrong medication information.       Please contact pt and discuss, thank you

## 2024-10-01 NOTE — PDOC.PN
- Subjective


Encounter Start Date: 10/31/18


Encounter Start Time: 14:25


Subjective: f/u for NSTEMI, UTI with E. coli on Cefepime. Feeling weak today.


-: Tolerating po intake. Ambulating with PT. 





- Objective


Resuscitation Status: 


 











Resuscitation Status           FULL:Full Resuscitation














MAR Reviewed: Yes


Vital Signs & Weight: 


 Vital Signs (12 hours)











  Temp Pulse Pulse Pulse Resp BP BP


 


 10/31/18 11:50  99.1 F  103 H    18  


 


 10/31/18 10:57   101 H    16  


 


 10/31/18 08:53    82  88   126/74  129/77


 


 10/31/18 08:00  99.3 F  91    18  


 


 10/31/18 06:51       


 


 10/31/18 06:34   85    16  


 


 10/31/18 04:00  98.0 F  85    18  














  BP Pulse Ox Pulse Ox Pulse Ox


 


 10/31/18 11:50  132/95 H  95  


 


 10/31/18 10:57   99  


 


 10/31/18 08:53    91 L  92 L


 


 10/31/18 08:00  120/67  91 L  


 


 10/31/18 06:51   92 L  


 


 10/31/18 06:34   92 L  


 


 10/31/18 04:00  111/68  90 L  








 Weight











Weight                         129 lb














I&O: 


 











 10/30/18 10/31/18 11/01/18





 06:59 06:59 06:59


 


Intake Total 1410 1935.5 480


 


Output Total  500 300


 


Balance 1410 1435.5 180











Result Diagrams: 


 10/31/18 03:54





 10/31/18 03:54


Additional Labs: 





Microbiology





10/28/18 19:50   Nasal swab   Influenza Types A,B Direct EIA - Final


10/28/18 15:15   Nasal swab   Influenza Types A,B Direct EIA - Final


10/28/18 14:13   Urine Straight Catheter   Urine Culture - Final


                              Escherichia coli


10/28/18 14:13   Venous blood - Left Arm   Blood Culture - Preliminary


                              Specimen has been received and culture in 

progress.


                              No Growth to date.


10/28/18 14:13   Urine Straight Catheter   Urine Culture - Preliminary


                              Gram Negative Dread


10/28/18 14:10   Venous blood - Left Hand   Blood Culture - Preliminary


                              Specimen has been received and culture in 

progress.


                              No Growth to date.





 Laboratory Tests











  10/28/18 10/28/18 10/28/18





  13:18 13:18 17:56


 


WBC   


 


Neutrophils %   


 


Sodium   135 L 


 


Creatinine   1.87 H 


 


Lactic Acid   


 


Troponin I  2.082 H*   2.059 H*


 


TSH 3rd Generation   














  10/28/18 10/28/18 10/28/18





  17:56 17:57 20:20


 


WBC   16.8 H 


 


Neutrophils %   80.7 H 


 


Sodium   


 


Creatinine   


 


Lactic Acid  1.4  


 


Troponin I   


 


TSH 3rd Generation    0.4216














  10/28/18 10/29/18 10/29/18





  23:00 01:59 01:59


 


WBC   


 


Neutrophils %    76.4 H


 


Sodium   


 


Creatinine   1.12 H 


 


Lactic Acid   


 


Troponin I  1.760 H*  


 


TSH 3rd Generation   














  10/29/18





  01:59


 


WBC 


 


Neutrophils % 


 


Sodium 


 


Creatinine 


 


Lactic Acid 


 


Troponin I  1.571 H*


 


TSH 3rd Generation 











EKG Reviewed by me: Yes (Tele - A-fib in 80's)





Phys Exam





- Physical Examination


Constitutional: NAD


alert, responsive


HEENT: PERRLA, sclera anicteric, oral pharynx no lesions


Neck: no nodes, no JVD, supple


occasional rhonchi


Respiratory: no wheezing


S1, S2


Cardiovascular: no significant murmur, no rub, gallop, irregular


Gastrointestinal: soft, non-tender, no distention, positive bowel sounds


Musculoskeletal: no edema, pulses present


Neurological: normal sensation, moves all 4 limbs


Psychiatric: A&O x 3


Skin: no rash, normal turgor, cap refill <2 seconds





Dx/Plan


(1) NSTEMI (non-ST elevated myocardial infarction)


Code(s): I21.4 - NON-ST ELEVATION (NSTEMI) MYOCARDIAL INFARCTION   Status: 

Acute   Comment: Continue Lovenox, ASA, Lipitor, Cardiology consulted, Echo 

with preserved EF 60-65%   





(2) Acute respiratory failure with hypoxia


Code(s): J96.01 - ACUTE RESPIRATORY FAILURE WITH HYPOXIA   Status: Acute   

Comment: Improved, off BiPAP NIMV, continue O2 via NC   





(3) Acute metabolic encephalopathy


Code(s): G93.41 - METABOLIC ENCEPHALOPATHY   Status: Acute   Comment: Likely 

multifactorial given chronic narcotic use, hypoxia and NSTEMI   





(4) ДМИТРИЙ (acute kidney injury)


Code(s): N17.9 - ACUTE KIDNEY FAILURE, UNSPECIFIED   Status: Acute   Comment: 

Improved, likely due to volume depletion, avoid nephrotoxic meds and limit 

contrast exposure   





(5) Atrial fibrillation with RVR


Code(s): I48.91 - UNSPECIFIED ATRIAL FIBRILLATION   Status: Acute   Comment: 

Converted to SR, continue Cardizem po, preserved EF, anticoagulation with 

Lovenox, pt high risk for OAC given fall risk   





(6) UTI (urinary tract infection)


Status: Acute   Comment: E.coli on Ucx, continue Cefepime another 24h   





(7) Chronic pain syndrome


Code(s): G89.4 - CHRONIC PAIN SYNDROME   Status: Chronic   Comment: Resume home 

MS Contin, Gabapentin, Norco   





(8) COPD (chronic obstructive pulmonary disease)


Status: Chronic   Comment: Continue Duonebs, Dulera, Proventil   





- Plan


continue antibiotics, PT/OT, , respiratory therapy, out of bed/

ambulate, DVT proph w/SCDs


Stable overall


-: Klor-Con 40meq BID


-: Continue Cefepime another 24h then convert to po regimen


-: OOB/PT


-: D/C Vancomycin





* Am lab: BMP


* TRansfer to tele Addended by: GUNJAN GARCIA on: 10/1/2024 04:13 PM     Modules accepted: Orders